# Patient Record
Sex: FEMALE | Race: BLACK OR AFRICAN AMERICAN | NOT HISPANIC OR LATINO | Employment: UNEMPLOYED | RURAL
[De-identification: names, ages, dates, MRNs, and addresses within clinical notes are randomized per-mention and may not be internally consistent; named-entity substitution may affect disease eponyms.]

---

## 2017-01-10 ENCOUNTER — OFFICE VISIT (OUTPATIENT)
Dept: FAMILY MEDICINE CLINIC | Facility: CLINIC | Age: 57
End: 2017-01-10

## 2017-01-10 VITALS
WEIGHT: 164.2 LBS | HEIGHT: 66 IN | BODY MASS INDEX: 26.39 KG/M2 | DIASTOLIC BLOOD PRESSURE: 88 MMHG | SYSTOLIC BLOOD PRESSURE: 126 MMHG | HEART RATE: 74 BPM | TEMPERATURE: 98.3 F | OXYGEN SATURATION: 98 %

## 2017-01-10 DIAGNOSIS — M54.2 NECK PAIN: ICD-10-CM

## 2017-01-10 DIAGNOSIS — R13.10 DYSPHAGIA, UNSPECIFIED TYPE: ICD-10-CM

## 2017-01-10 DIAGNOSIS — I10 ESSENTIAL HYPERTENSION: ICD-10-CM

## 2017-01-10 DIAGNOSIS — Z11.59 NEED FOR HEPATITIS C SCREENING TEST: Primary | ICD-10-CM

## 2017-01-10 DIAGNOSIS — M54.12 RADICULOPATHY, CERVICAL REGION: ICD-10-CM

## 2017-01-10 DIAGNOSIS — G62.9 NEUROPATHY: ICD-10-CM

## 2017-01-10 DIAGNOSIS — E78.49 OTHER HYPERLIPIDEMIA: ICD-10-CM

## 2017-01-10 DIAGNOSIS — E78.2 MIXED HYPERLIPIDEMIA: ICD-10-CM

## 2017-01-10 DIAGNOSIS — E55.9 VITAMIN D DEFICIENCY: ICD-10-CM

## 2017-01-10 PROCEDURE — 99214 OFFICE O/P EST MOD 30 MIN: CPT | Performed by: FAMILY MEDICINE

## 2017-01-10 NOTE — MR AVS SNAPSHOT
Ines Saravia   1/10/2017 8:15 AM   Office Visit    Dept Phone:  [phone]   Encounter #:  [id number]    Provider:  Micheal Morales MD   Department:  [facility]                Your Full Care Plan              Today's Medication Changes          These changes are accurate as of: 1/10/17  8:47 AM.  If you have any questions, ask your nurse or doctor.               Stop taking medication(s)listed here:     chlorpheniramine 4 MG tablet   Commonly known as:  CHLOR-TRIMETON   Stopped by:  Micheal Morales MD           levoFLOXacin 750 MG tablet   Commonly known as:  LEVAQUIN   Stopped by:  Micheal Morales MD           mometasone 50 MCG/ACT nasal spray   Commonly known as:  NASONEX   Stopped by:  Micheal Morales MD           permethrin 5 % cream   Commonly known as:  ACTICIN   Stopped by:  Micheal Morales MD           predniSONE 10 MG tablet   Commonly known as:  DELTASONE   Stopped by:  Micheal Morales MD           triamcinolone 0.1 % cream   Commonly known as:  KENALOG   Stopped by:  Micheal Morales MD                      Your Updated Medication List          This list is accurate as of: 1/10/17  8:47 AM.  Always use your most recent med list.                atorvastatin 20 MG tablet   Commonly known as:  LIPITOR   TAKE ONE TABLET BY MOUTH AT BEDTIME       lisinopril 10 MG tablet   Commonly known as:  PRINIVIL,ZESTRIL   TAKE ONE TABLET BY MOUTH ONCE DAILY       RA VITAMIN D-3 5000 UNITS capsule   Generic drug:  vitamin d               You Were Diagnosed With        Codes Comments    Need for hepatitis C screening test    -  Primary ICD-10-CM: Z11.59  ICD-9-CM: V73.89       Instructions    Preventive Care for Adults, Female  A healthy lifestyle and preventive care can promote health and wellness. Preventive health guidelines for women include the following key practices.  · A routine yearly physical is  a good way to check with your health care provider about your health and preventive screening. It is a chance to share any concerns and updates on your health and to receive a thorough exam.  · Visit your dentist for a routine exam and preventive care every 6 months. Brush your teeth twice a day and floss once a day. Good oral hygiene prevents tooth decay and gum disease.  · The frequency of eye exams is based on your age, health, family medical history, use of contact lenses, and other factors. Follow your health care provider's recommendations for frequency of eye exams.  · Eat a healthy diet. Foods like vegetables, fruits, whole grains, low-fat dairy products, and lean protein foods contain the nutrients you need without too many calories. Decrease your intake of foods high in solid fats, added sugars, and salt. Eat the right amount of calories for you. Get information about a proper diet from your health care provider, if necessary.  · Regular physical exercise is one of the most important things you can do for your health. Most adults should get at least 150 minutes of moderate-intensity exercise (any activity that increases your heart rate and causes you to sweat) each week. In addition, most adults need muscle-strengthening exercises on 2 or more days a week.  · Maintain a healthy weight. The body mass index (BMI) is a screening tool to identify possible weight problems. It provides an estimate of body fat based on height and weight. Your health care provider can find your BMI and can help you achieve or maintain a healthy weight. For adults 20 years and older:    A BMI below 18.5 is considered underweight.    A BMI of 18.5 to 24.9 is normal.    A BMI of 25 to 29.9 is considered overweight.    A BMI of 30 and above is considered obese.  · Maintain normal blood lipids and cholesterol levels by exercising and minimizing your intake of saturated fat. Eat a balanced diet with plenty of fruit and vegetables. Blood  tests for lipids and cholesterol should begin at age 20 and be repeated every 5 years. If your lipid or cholesterol levels are high, you are over 50, or you are at high risk for heart disease, you may need your cholesterol levels checked more frequently. Ongoing high lipid and cholesterol levels should be treated with medicines if diet and exercise are not working.  · If you smoke, find out from your health care provider how to quit. If you do not use tobacco, do not start.  · Lung cancer screening is recommended for adults aged 55-80 years who are at high risk for developing lung cancer because of a history of smoking. A yearly low-dose CT scan of the lungs is recommended for people who have at least a 30-pack-year history of smoking and are a current smoker or have quit within the past 15 years. A pack year of smoking is smoking an average of 1 pack of cigarettes a day for 1 year (for example: 1 pack a day for 30 years or 2 packs a day for 15 years). Yearly screening should continue until the smoker has stopped smoking for at least 15 years. Yearly screening should be stopped for people who develop a health problem that would prevent them from having lung cancer treatment.  · If you are pregnant, do not drink alcohol. If you are breastfeeding, be very cautious about drinking alcohol. If you are not pregnant and choose to drink alcohol, do not have more than 1 drink per day. One drink is considered to be 12 ounces (355 mL) of beer, 5 ounces (148 mL) of wine, or 1.5 ounces (44 mL) of liquor.  · Avoid use of street drugs. Do not share needles with anyone. Ask for help if you need support or instructions about stopping the use of drugs.  · High blood pressure causes heart disease and increases the risk of stroke. Your blood pressure should be checked at least every 1 to 2 years. Ongoing high blood pressure should be treated with medicines if weight loss and exercise do not work.  · If you are 55-79 years old, ask your  "health care provider if you should take aspirin to prevent strokes.  · Diabetes screening is done by taking a blood sample to check your blood glucose level after you have not eaten for a certain period of time (fasting). If you are not overweight and you do not have risk factors for diabetes, you should be screened once every 3 years starting at age 45. If you are overweight or obese and you are 40-70 years of age, you should be screened for diabetes every year as part of your cardiovascular risk assessment.  · Breast cancer screening is essential preventive care for women. You should practice \"breast self-awareness.\" This means understanding the normal appearance and feel of your breasts and may include breast self-examination. Any changes detected, no matter how small, should be reported to a health care provider. Women in their 20s and 30s should have a clinical breast exam (CBE) by a health care provider as part of a regular health exam every 1 to 3 years. After age 40, women should have a CBE every year. Starting at age 40, women should consider having a mammogram (breast X-ray test) every year. Women who have a family history of breast cancer should talk to their health care provider about genetic screening. Women at a high risk of breast cancer should talk to their health care providers about having an MRI and a mammogram every year.  · Breast cancer gene (BRCA)-related cancer risk assessment is recommended for women who have family members with BRCA-related cancers. BRCA-related cancers include breast, ovarian, tubal, and peritoneal cancers. Having family members with these cancers may be associated with an increased risk for harmful changes (mutations) in the breast cancer genes BRCA1 and BRCA2. Results of the assessment will determine the need for genetic counseling and BRCA1 and BRCA2 testing.  · Your health care provider may recommend that you be screened regularly for cancer of the pelvic organs " (ovaries, uterus, and vagina). This screening involves a pelvic examination, including checking for microscopic changes to the surface of your cervix (Pap test). You may be encouraged to have this screening done every 3 years, beginning at age 21.    For women ages 30-65, health care providers may recommend pelvic exams and Pap testing every 3 years, or they may recommend the Pap and pelvic exam, combined with testing for human papilloma virus (HPV), every 5 years. Some types of HPV increase your risk of cervical cancer. Testing for HPV may also be done on women of any age with unclear Pap test results.    Other health care providers may not recommend any screening for nonpregnant women who are considered low risk for pelvic cancer and who do not have symptoms. Ask your health care provider if a screening pelvic exam is right for you.  · If you have had past treatment for cervical cancer or a condition that could lead to cancer, you need Pap tests and screening for cancer for at least 20 years after your treatment. If Pap tests have been discontinued, your risk factors (such as having a new sexual partner) need to be reassessed to determine if screening should resume. Some women have medical problems that increase the chance of getting cervical cancer. In these cases, your health care provider may recommend more frequent screening and Pap tests.  · Colorectal cancer can be detected and often prevented. Most routine colorectal cancer screening begins at the age of 50 years and continues through age 75 years. However, your health care provider may recommend screening at an earlier age if you have risk factors for colon cancer. On a yearly basis, your health care provider may provide home test kits to check for hidden blood in the stool. Use of a small camera at the end of a tube, to directly examine the colon (sigmoidoscopy or colonoscopy), can detect the earliest forms of colorectal cancer. Talk to your health care  provider about this at age 50, when routine screening begins.  Direct exam of the colon should be repeated every 5-10 years through age 75 years, unless early forms of precancerous polyps or small growths are found.  · People who are at an increased risk for hepatitis B should be screened for this virus. You are considered at high risk for hepatitis B if:    You were born in a country where hepatitis B occurs often. Talk with your health care provider about which countries are considered high risk.    Your parents were born in a high-risk country and you have not received a shot to protect against hepatitis B (hepatitis B vaccine).    You have HIV or AIDS.    You use needles to inject street drugs.    You live with, or have sex with, someone who has hepatitis B.    You get hemodialysis treatment.    You take certain medicines for conditions like cancer, organ transplantation, and autoimmune conditions.  · Hepatitis C blood testing is recommended for all people born from 1945 through 1965 and any individual with known risks for hepatitis C.  · Practice safe sex. Use condoms and avoid high-risk sexual practices to reduce the spread of sexually transmitted infections (STIs). STIs include gonorrhea, chlamydia, syphilis, trichomonas, herpes, HPV, and human immunodeficiency virus (HIV). Herpes, HIV, and HPV are viral illnesses that have no cure. They can result in disability, cancer, and death.  · You should be screened for sexually transmitted illnesses (STIs) including gonorrhea and chlamydia if:    You are sexually active and are younger than 24 years.    You are older than 24 years and your health care provider tells you that you are at risk for this type of infection.    Your sexual activity has changed since you were last screened and you are at an increased risk for chlamydia or gonorrhea. Ask your health care provider if you are at risk.  · If you are at risk of being infected with HIV, it is recommended that you  take a prescription medicine daily to prevent HIV infection. This is called preexposure prophylaxis (PrEP). You are considered at risk if:    You are sexually active and do not regularly use condoms or know the HIV status of your partner(s).    You take drugs by injection.    You are sexually active with a partner who has HIV.    Talk with your health care provider about whether you are at high risk of being infected with HIV. If you choose to begin PrEP, you should first be tested for HIV. You should then be tested every 3 months for as long as you are taking PrEP.  · Osteoporosis is a disease in which the bones lose minerals and strength with aging. This can result in serious bone fractures or breaks. The risk of osteoporosis can be identified using a bone density scan. Women ages 65 years and over and women at risk for fractures or osteoporosis should discuss screening with their health care providers. Ask your health care provider whether you should take a calcium supplement or vitamin D to reduce the rate of osteoporosis.  · Menopause can be associated with physical symptoms and risks. Hormone replacement therapy is available to decrease symptoms and risks. You should talk to your health care provider about whether hormone replacement therapy is right for you.  · Use sunscreen. Apply sunscreen liberally and repeatedly throughout the day. You should seek shade when your shadow is shorter than you. Protect yourself by wearing long sleeves, pants, a wide-brimmed hat, and sunglasses year round, whenever you are outdoors.  · Once a month, do a whole body skin exam, using a mirror to look at the skin on your back. Tell your health care provider of new moles, moles that have irregular borders, moles that are larger than a pencil eraser, or moles that have changed in shape or color.  · Stay current with required vaccines (immunizations).    Influenza vaccine. All adults should be immunized every year.    Tetanus,  diphtheria, and acellular pertussis (Td, Tdap) vaccine. Pregnant women should receive 1 dose of Tdap vaccine during each pregnancy. The dose should be obtained regardless of the length of time since the last dose. Immunization is preferred during the 27th-36th week of gestation. An adult who has not previously received Tdap or who does not know her vaccine status should receive 1 dose of Tdap. This initial dose should be followed by tetanus and diphtheria toxoids (Td) booster doses every 10 years. Adults with an unknown or incomplete history of completing a 3-dose immunization series with Td-containing vaccines should begin or complete a primary immunization series including a Tdap dose. Adults should receive a Td booster every 10 years.    Varicella vaccine. An adult without evidence of immunity to varicella should receive 2 doses or a second dose if she has previously received 1 dose. Pregnant females who do not have evidence of immunity should receive the first dose after pregnancy. This first dose should be obtained before leaving the health care facility. The second dose should be obtained 4-8 weeks after the first dose.    Human papillomavirus (HPV) vaccine. Females aged 13-26 years who have not received the vaccine previously should obtain the 3-dose series. The vaccine is not recommended for use in pregnant females. However, pregnancy testing is not needed before receiving a dose. If a female is found to be pregnant after receiving a dose, no treatment is needed. In that case, the remaining doses should be delayed until after the pregnancy. Immunization is recommended for any person with an immunocompromised condition through the age of 26 years if she did not get any or all doses earlier. During the 3-dose series, the second dose should be obtained 4-8 weeks after the first dose. The third dose should be obtained 24 weeks after the first dose and 16 weeks after the second dose.    Zoster vaccine. One dose  is recommended for adults aged 60 years or older unless certain conditions are present.    Measles, mumps, and rubella (MMR) vaccine. Adults born before 1957 generally are considered immune to measles and mumps. Adults born in 1957 or later should have 1 or more doses of MMR vaccine unless there is a contraindication to the vaccine or there is laboratory evidence of immunity to each of the three diseases. A routine second dose of MMR vaccine should be obtained at least 28 days after the first dose for students attending postsecondary schools, health care workers, or international travelers. People who received inactivated measles vaccine or an unknown type of measles vaccine during [id number] should receive 2 doses of MMR vaccine. People who received inactivated mumps vaccine or an unknown type of mumps vaccine before 1979 and are at high risk for mumps infection should consider immunization with 2 doses of MMR vaccine. For females of childbearing age, rubella immunity should be determined. If there is no evidence of immunity, females who are not pregnant should be vaccinated. If there is no evidence of immunity, females who are pregnant should delay immunization until after pregnancy. Unvaccinated health care workers born before 1957 who lack laboratory evidence of measles, mumps, or rubella immunity or laboratory confirmation of disease should consider measles and mumps immunization with 2 doses of MMR vaccine or rubella immunization with 1 dose of MMR vaccine.    Pneumococcal 13-valent conjugate (PCV13) vaccine. When indicated, a person who is uncertain of his immunization history and has no record of immunization should receive the PCV13 vaccine. All adults 65 years of age and older should receive this vaccine. An adult aged 19 years or older who has certain medical conditions and has not been previously immunized should receive 1 dose of PCV13 vaccine. This PCV13 should be followed with a dose of pneumococcal  polysaccharide (PPSV23) vaccine. Adults who are at high risk for pneumococcal disease should obtain the PPSV23 vaccine at least 8 weeks after the dose of PCV13 vaccine. Adults older than 65 years of age who have normal immune system function should obtain the PPSV23 vaccine dose at least 1 year after the dose of PCV13 vaccine.    Pneumococcal polysaccharide (PPSV23) vaccine. When PCV13 is also indicated, PCV13 should be obtained first. All adults aged 65 years and older should be immunized. An adult younger than age 65 years who has certain medical conditions should be immunized. Any person who resides in a nursing home or long-term care facility should be immunized. An adult smoker should be immunized. People with an immunocompromised condition and certain other conditions should receive both PCV13 and PPSV23 vaccines. People with human immunodeficiency virus (HIV) infection should be immunized as soon as possible after diagnosis. Immunization during chemotherapy or radiation therapy should be avoided. Routine use of PPSV23 vaccine is not recommended for American Indians, [place] Natives, or people younger than 65 years unless there are medical conditions that require PPSV23 vaccine. When indicated, people who have unknown immunization and have no record of immunization should receive PPSV23 vaccine. One-time revaccination 5 years after the first dose of PPSV23 is recommended for people aged 19-64 years who have chronic kidney failure, nephrotic syndrome, asplenia, or immunocompromised conditions. People who received 1-2 doses of PPSV23 before age 65 years should receive another dose of PPSV23 vaccine at age 65 years or later if at least 5 years have passed since the previous dose. Doses of PPSV23 are not needed for people immunized with PPSV23 at or after age 65 years.    Meningococcal vaccine. Adults with asplenia or persistent complement component deficiencies should receive 2 doses of quadrivalent meningococcal  conjugate (MenACWY-D) vaccine. The doses should be obtained at least 2 months apart. Microbiologists working with certain meningococcal bacteria,  recruits, people at risk during an outbreak, and people who travel to or live in countries with a high rate of meningitis should be immunized. A first-year college student up through age 21 years who is living in a residence [place] should receive a dose if she did not receive a dose on or after her 16th birthday. Adults who have certain high-risk conditions should receive one or more doses of vaccine.    Hepatitis A vaccine. Adults who wish to be protected from this disease, have certain high-risk conditions, work with hepatitis A-infected animals, work in hepatitis A research labs, or travel to or work in countries with a high rate of hepatitis A should be immunized. Adults who were previously unvaccinated and who anticipate close contact with an international adoptee during the first 60 days after arrival in the [place] from a country with a high rate of hepatitis A should be immunized.    Hepatitis B vaccine. Adults who wish to be protected from this disease, have certain high-risk conditions, may be exposed to blood or other infectious body fluids, are household contacts or sex partners of hepatitis B positive people, are clients or workers in certain care facilities, or travel to or work in countries with a high rate of hepatitis B should be immunized.    Haemophilus influenzae type b (Hib) vaccine. A previously unvaccinated person with asplenia or sickle cell disease or having a scheduled splenectomy should receive 1 dose of Hib vaccine. Regardless of previous immunization, a recipient of a hematopoietic stem cell transplant should receive a 3-dose series 6-12 months after her successful transplant. Hib vaccine is not recommended for adults with HIV infection.  Preventive Services / Frequency  Ages 19 to 39 years  · Blood pressure check.** / Every 3-5  years.  · Lipid and cholesterol check.** / Every 5 years beginning at age 20.  · Clinical breast exam.** / Every 3 years for women in their 20s and 30s.  · BRCA-related cancer risk assessment.** / For women who have family members with a BRCA-related cancer (breast, ovarian, tubal, or peritoneal cancers).  · Pap test.** / Every 2 years from ages 21 through 29. Every 3 years starting at age 30 through age 65 or 70 with a history of 3 consecutive normal Pap tests.  · HPV screening.** / Every 3 years from ages 30 through ages 65 to 70 with a history of 3 consecutive normal Pap tests.  · Hepatitis C blood test.** / For any individual with known risks for hepatitis C.  · Skin self-exam. / Monthly.  · Influenza vaccine. / Every year.  · Tetanus, diphtheria, and acellular pertussis (Tdap, Td) vaccine.** / Consult your health care provider. Pregnant women should receive 1 dose of Tdap vaccine during each pregnancy. 1 dose of Td every 10 years.  · Varicella vaccine.** / Consult your health care provider. Pregnant females who do not have evidence of immunity should receive the first dose after pregnancy.  · HPV vaccine. / 3 doses over 6 months, if 26 and younger. The vaccine is not recommended for use in pregnant females. However, pregnancy testing is not needed before receiving a dose.  · Measles, mumps, rubella (MMR) vaccine.** / You need at least 1 dose of MMR if you were born in 1957 or later. You may also need a 2nd dose. For females of childbearing age, rubella immunity should be determined. If there is no evidence of immunity, females who are not pregnant should be vaccinated. If there is no evidence of immunity, females who are pregnant should delay immunization until after pregnancy.  · Pneumococcal 13-valent conjugate (PCV13) vaccine.** / Consult your health care provider.  · Pneumococcal polysaccharide (PPSV23) vaccine.** / 1 to 2 doses if you smoke cigarettes or if you have certain conditions.  · Meningococcal  vaccine.** / 1 dose if you are age 19 to 21 years and a first-year college student living in a residence [place], or have one of several medical conditions, you need to get vaccinated against meningococcal disease. You may also need additional booster doses.  · Hepatitis A vaccine.** / Consult your health care provider.  · Hepatitis B vaccine.** / Consult your health care provider.  · Haemophilus influenzae type b (Hib) vaccine.** / Consult your health care provider.  Ages 40 to 64 years  · Blood pressure check.** / Every year.  · Lipid and cholesterol check.** / Every 5 years beginning at age 20 years.  · Lung cancer screening. / Every year if you are aged 55-80 years and have a 30-pack-year history of smoking and currently smoke or have quit within the past 15 years. Yearly screening is stopped once you have quit smoking for at least 15 years or develop a health problem that would prevent you from having lung cancer treatment.  · Clinical breast exam.** / Every year after age 40 years.  ·  BRCA-related cancer risk assessment.** / For women who have family members with a BRCA-related cancer (breast, ovarian, tubal, or peritoneal cancers).  · Mammogram.** / Every year beginning at age 40 years and continuing for as long as you are in good health. Consult with your health care provider.  · Pap test.** / Every 3 years starting at age 30 years through age 65 or 70 years with a history of 3 consecutive normal Pap tests.  · HPV screening.** / Every 3 years from ages 30 years through ages 65 to 70 years with a history of 3 consecutive normal Pap tests.  · Fecal occult blood test (FOBT) of stool. / Every year beginning at age 50 years and continuing until age 75 years. You may not need to do this test if you get a colonoscopy every 10 years.  · Flexible sigmoidoscopy or colonoscopy.** / Every 5 years for a flexible sigmoidoscopy or every 10 years for a colonoscopy beginning at age 50 years and continuing until age 75  years.  · Hepatitis C blood test.** / For all people born from 1945 through 1965 and any individual with known risks for hepatitis C.  · Skin self-exam. / Monthly.  · Influenza vaccine. / Every year.  · Tetanus, diphtheria, and acellular pertussis (Tdap/Td) vaccine.** / Consult your health care provider. Pregnant women should receive 1 dose of Tdap vaccine during each pregnancy. 1 dose of Td every 10 years.  · Varicella vaccine.** / Consult your health care provider. Pregnant females who do not have evidence of immunity should receive the first dose after pregnancy.  · Zoster vaccine.** / 1 dose for adults aged 60 years or older.  · Measles, mumps, rubella (MMR) vaccine.** / You need at least 1 dose of MMR if you were born in 1957 or later. You may also need a second dose. For females of childbearing age, rubella immunity should be determined. If there is no evidence of immunity, females who are not pregnant should be vaccinated. If there is no evidence of immunity, females who are pregnant should delay immunization until after pregnancy.  · Pneumococcal 13-valent conjugate (PCV13) vaccine.** / Consult your health care provider.  · Pneumococcal polysaccharide (PPSV23) vaccine.** / 1 to 2 doses if you smoke cigarettes or if you have certain conditions.  · Meningococcal vaccine.** / Consult your health care provider.  · Hepatitis A vaccine.** / Consult your health care provider.  · Hepatitis B vaccine.** / Consult your health care provider.  · Haemophilus influenzae type b (Hib) vaccine.** / Consult your health care provider.  Ages 65 years and over  · Blood pressure check.** / Every year.  · Lipid and cholesterol check.** / Every 5 years beginning at age 20 years.  · Lung cancer screening. / Every year if you are aged 55-80 years and have a 30-pack-year history of smoking and currently smoke or have quit within the past 15 years. Yearly screening is stopped once you have quit smoking for at least 15 years or develop  a health problem that would prevent you from having lung cancer treatment.  · Clinical breast exam.** / Every year after age 40 years.  ·  BRCA-related cancer risk assessment.** / For women who have family members with a BRCA-related cancer (breast, ovarian, tubal, or peritoneal cancers).  · Mammogram.** / Every year beginning at age 40 years and continuing for as long as you are in good health. Consult with your health care provider.  · Pap test.** / Every 3 years starting at age 30 years through age 65 or 70 years with 3 consecutive normal Pap tests. Testing can be stopped between 65 and 70 years with 3 consecutive normal Pap tests and no abnormal Pap or HPV tests in the past 10 years.  · HPV screening.** / Every 3 years from ages 30 years through ages 65 or 70 years with a history of 3 consecutive normal Pap tests. Testing can be stopped between 65 and 70 years with 3 consecutive normal Pap tests and no abnormal Pap or HPV tests in the past 10 years.  · Fecal occult blood test (FOBT) of stool. / Every year beginning at age 50 years and continuing until age 75 years. You may not need to do this test if you get a colonoscopy every 10 years.  · Flexible sigmoidoscopy or colonoscopy.** / Every 5 years for a flexible sigmoidoscopy or every 10 years for a colonoscopy beginning at age 50 years and continuing until age 75 years.  · Hepatitis C blood test.** / For all people born from 1945 through 1965 and any individual with known risks for hepatitis C.  · Osteoporosis screening.** / A one-time screening for women ages 65 years and over and women at risk for fractures or osteoporosis.  · Skin self-exam. / Monthly.  · Influenza vaccine. / Every year.  · Tetanus, diphtheria, and acellular pertussis (Tdap/Td) vaccine.** / 1 dose of Td every 10 years.  · Varicella vaccine.** / Consult your health care provider.  · Zoster vaccine.** / 1 dose for adults aged 60 years or older.  · Pneumococcal 13-valent conjugate (PCV13)  vaccine.** / Consult your health care provider.  · Pneumococcal polysaccharide (PPSV23) vaccine.** / 1 dose for all adults aged 65 years and older.  · Meningococcal vaccine.** / Consult your health care provider.  · Hepatitis A vaccine.** / Consult your health care provider.  · Hepatitis B vaccine.** / Consult your health care provider.  · Haemophilus influenzae type b (Hib) vaccine.** / Consult your health care provider.  ** Family history and personal history of risk and conditions may change your health care provider's recommendations.     This information is not intended to replace advice given to you by your health care provider. Make sure you discuss any questions you have with your health care provider.     Document Released: 02/13/2003 Document Revised: 01/08/2016 Document Reviewed: 05/14/2012  [employer] Patient Education ©2016 [employer]       Patient Instructions History      Goals     Other     B/P controlled  L arm better  Teeth pulled ,obtain dentures    Rash resolved        Upcoming Appointments     Visit Type Date Time Department    OFFICE VISIT 1/10/2017  8:15 AM [facility]    OFFICE VISIT 1/31/2017  3:20 PM [facility]    OFFICE VISIT 7/12/2017  8:00 AM [facility]      MyChart Signup     Our records indicate that you have declined [facility] Document[employer]t signup. If you would like to sign up for Document[employer], please email [facility] Options [email or web address] or call [phone] to obtain an activation code.             Other Info from Your Visit           Your Appointments     Jan 31, 2017  3:20 PM CST   Office Visit with Jens Mustafa PA-C   [facility] (--)    [address] [facility] [place] [address] [phone]           Arrive 15 minutes prior to appointment.            Jul 12, 2017  8:00 AM CDT   Office Visit with Micheal Morales MD   [facility] (--)    [facility]  "Practice - [address] [place] [address]  [facility] [address] [phone]           Arrive 15 minutes prior to appointment.              Allergies     Penicillins        Reason for Visit     Hypertension     Sinusitis           Vital Signs     Blood Pressure Pulse Temperature Height Weight Oxygen Saturation    126/88 (BP Location: Left arm, Patient Position: Sitting, Cuff Size: Adult) 74 98.3 °F (36.8 °C) (Oral) 66\" (167.6 cm) 164 lb 3.2 oz (74.5 kg) 98%    Body Mass Index Smoking Status                26.5 kg/m2 Never Smoker          Problems and Diagnoses Noted     Need for hepatitis C screening test    -  Primary        "

## 2017-01-10 NOTE — PATIENT INSTRUCTIONS
Preventive Care for Adults, Female  A healthy lifestyle and preventive care can promote health and wellness. Preventive health guidelines for women include the following key practices.  · A routine yearly physical is a good way to check with your health care provider about your health and preventive screening. It is a chance to share any concerns and updates on your health and to receive a thorough exam.  · Visit your dentist for a routine exam and preventive care every 6 months. Brush your teeth twice a day and floss once a day. Good oral hygiene prevents tooth decay and gum disease.  · The frequency of eye exams is based on your age, health, family medical history, use of contact lenses, and other factors. Follow your health care provider's recommendations for frequency of eye exams.  · Eat a healthy diet. Foods like vegetables, fruits, whole grains, low-fat dairy products, and lean protein foods contain the nutrients you need without too many calories. Decrease your intake of foods high in solid fats, added sugars, and salt. Eat the right amount of calories for you. Get information about a proper diet from your health care provider, if necessary.  · Regular physical exercise is one of the most important things you can do for your health. Most adults should get at least 150 minutes of moderate-intensity exercise (any activity that increases your heart rate and causes you to sweat) each week. In addition, most adults need muscle-strengthening exercises on 2 or more days a week.  · Maintain a healthy weight. The body mass index (BMI) is a screening tool to identify possible weight problems. It provides an estimate of body fat based on height and weight. Your health care provider can find your BMI and can help you achieve or maintain a healthy weight. For adults 20 years and older:    A BMI below 18.5 is considered underweight.    A BMI of 18.5 to 24.9 is normal.    A BMI of 25 to 29.9 is considered overweight.    A  BMI of 30 and above is considered obese.  · Maintain normal blood lipids and cholesterol levels by exercising and minimizing your intake of saturated fat. Eat a balanced diet with plenty of fruit and vegetables. Blood tests for lipids and cholesterol should begin at age 20 and be repeated every 5 years. If your lipid or cholesterol levels are high, you are over 50, or you are at high risk for heart disease, you may need your cholesterol levels checked more frequently. Ongoing high lipid and cholesterol levels should be treated with medicines if diet and exercise are not working.  · If you smoke, find out from your health care provider how to quit. If you do not use tobacco, do not start.  · Lung cancer screening is recommended for adults aged 55-80 years who are at high risk for developing lung cancer because of a history of smoking. A yearly low-dose CT scan of the lungs is recommended for people who have at least a 30-pack-year history of smoking and are a current smoker or have quit within the past 15 years. A pack year of smoking is smoking an average of 1 pack of cigarettes a day for 1 year (for example: 1 pack a day for 30 years or 2 packs a day for 15 years). Yearly screening should continue until the smoker has stopped smoking for at least 15 years. Yearly screening should be stopped for people who develop a health problem that would prevent them from having lung cancer treatment.  · If you are pregnant, do not drink alcohol. If you are breastfeeding, be very cautious about drinking alcohol. If you are not pregnant and choose to drink alcohol, do not have more than 1 drink per day. One drink is considered to be 12 ounces (355 mL) of beer, 5 ounces (148 mL) of wine, or 1.5 ounces (44 mL) of liquor.  · Avoid use of street drugs. Do not share needles with anyone. Ask for help if you need support or instructions about stopping the use of drugs.  · High blood pressure causes heart disease and increases the risk  "of stroke. Your blood pressure should be checked at least every 1 to 2 years. Ongoing high blood pressure should be treated with medicines if weight loss and exercise do not work.  · If you are 55-79 years old, ask your health care provider if you should take aspirin to prevent strokes.  · Diabetes screening is done by taking a blood sample to check your blood glucose level after you have not eaten for a certain period of time (fasting). If you are not overweight and you do not have risk factors for diabetes, you should be screened once every 3 years starting at age 45. If you are overweight or obese and you are 40-70 years of age, you should be screened for diabetes every year as part of your cardiovascular risk assessment.  · Breast cancer screening is essential preventive care for women. You should practice \"breast self-awareness.\" This means understanding the normal appearance and feel of your breasts and may include breast self-examination. Any changes detected, no matter how small, should be reported to a health care provider. Women in their 20s and 30s should have a clinical breast exam (CBE) by a health care provider as part of a regular health exam every 1 to 3 years. After age 40, women should have a CBE every year. Starting at age 40, women should consider having a mammogram (breast X-ray test) every year. Women who have a family history of breast cancer should talk to their health care provider about genetic screening. Women at a high risk of breast cancer should talk to their health care providers about having an MRI and a mammogram every year.  · Breast cancer gene (BRCA)-related cancer risk assessment is recommended for women who have family members with BRCA-related cancers. BRCA-related cancers include breast, ovarian, tubal, and peritoneal cancers. Having family members with these cancers may be associated with an increased risk for harmful changes (mutations) in the breast cancer genes BRCA1 and " BRCA2. Results of the assessment will determine the need for genetic counseling and BRCA1 and BRCA2 testing.  · Your health care provider may recommend that you be screened regularly for cancer of the pelvic organs (ovaries, uterus, and vagina). This screening involves a pelvic examination, including checking for microscopic changes to the surface of your cervix (Pap test). You may be encouraged to have this screening done every 3 years, beginning at age 21.    For women ages 30-65, health care providers may recommend pelvic exams and Pap testing every 3 years, or they may recommend the Pap and pelvic exam, combined with testing for human papilloma virus (HPV), every 5 years. Some types of HPV increase your risk of cervical cancer. Testing for HPV may also be done on women of any age with unclear Pap test results.    Other health care providers may not recommend any screening for nonpregnant women who are considered low risk for pelvic cancer and who do not have symptoms. Ask your health care provider if a screening pelvic exam is right for you.  · If you have had past treatment for cervical cancer or a condition that could lead to cancer, you need Pap tests and screening for cancer for at least 20 years after your treatment. If Pap tests have been discontinued, your risk factors (such as having a new sexual partner) need to be reassessed to determine if screening should resume. Some women have medical problems that increase the chance of getting cervical cancer. In these cases, your health care provider may recommend more frequent screening and Pap tests.  · Colorectal cancer can be detected and often prevented. Most routine colorectal cancer screening begins at the age of 50 years and continues through age 75 years. However, your health care provider may recommend screening at an earlier age if you have risk factors for colon cancer. On a yearly basis, your health care provider may provide home test kits to check  for hidden blood in the stool. Use of a small camera at the end of a tube, to directly examine the colon (sigmoidoscopy or colonoscopy), can detect the earliest forms of colorectal cancer. Talk to your health care provider about this at age 50, when routine screening begins.  Direct exam of the colon should be repeated every 5-10 years through age 75 years, unless early forms of precancerous polyps or small growths are found.  · People who are at an increased risk for hepatitis B should be screened for this virus. You are considered at high risk for hepatitis B if:    You were born in a country where hepatitis B occurs often. Talk with your health care provider about which countries are considered high risk.    Your parents were born in a high-risk country and you have not received a shot to protect against hepatitis B (hepatitis B vaccine).    You have HIV or AIDS.    You use needles to inject street drugs.    You live with, or have sex with, someone who has hepatitis B.    You get hemodialysis treatment.    You take certain medicines for conditions like cancer, organ transplantation, and autoimmune conditions.  · Hepatitis C blood testing is recommended for all people born from 1945 through 1965 and any individual with known risks for hepatitis C.  · Practice safe sex. Use condoms and avoid high-risk sexual practices to reduce the spread of sexually transmitted infections (STIs). STIs include gonorrhea, chlamydia, syphilis, trichomonas, herpes, HPV, and human immunodeficiency virus (HIV). Herpes, HIV, and HPV are viral illnesses that have no cure. They can result in disability, cancer, and death.  · You should be screened for sexually transmitted illnesses (STIs) including gonorrhea and chlamydia if:    You are sexually active and are younger than 24 years.    You are older than 24 years and your health care provider tells you that you are at risk for this type of infection.    Your sexual activity has changed  since you were last screened and you are at an increased risk for chlamydia or gonorrhea. Ask your health care provider if you are at risk.  · If you are at risk of being infected with HIV, it is recommended that you take a prescription medicine daily to prevent HIV infection. This is called preexposure prophylaxis (PrEP). You are considered at risk if:    You are sexually active and do not regularly use condoms or know the HIV status of your partner(s).    You take drugs by injection.    You are sexually active with a partner who has HIV.    Talk with your health care provider about whether you are at high risk of being infected with HIV. If you choose to begin PrEP, you should first be tested for HIV. You should then be tested every 3 months for as long as you are taking PrEP.  · Osteoporosis is a disease in which the bones lose minerals and strength with aging. This can result in serious bone fractures or breaks. The risk of osteoporosis can be identified using a bone density scan. Women ages 65 years and over and women at risk for fractures or osteoporosis should discuss screening with their health care providers. Ask your health care provider whether you should take a calcium supplement or vitamin D to reduce the rate of osteoporosis.  · Menopause can be associated with physical symptoms and risks. Hormone replacement therapy is available to decrease symptoms and risks. You should talk to your health care provider about whether hormone replacement therapy is right for you.  · Use sunscreen. Apply sunscreen liberally and repeatedly throughout the day. You should seek shade when your shadow is shorter than you. Protect yourself by wearing long sleeves, pants, a wide-brimmed hat, and sunglasses year round, whenever you are outdoors.  · Once a month, do a whole body skin exam, using a mirror to look at the skin on your back. Tell your health care provider of new moles, moles that have irregular borders, moles that  are larger than a pencil eraser, or moles that have changed in shape or color.  · Stay current with required vaccines (immunizations).    Influenza vaccine. All adults should be immunized every year.    Tetanus, diphtheria, and acellular pertussis (Td, Tdap) vaccine. Pregnant women should receive 1 dose of Tdap vaccine during each pregnancy. The dose should be obtained regardless of the length of time since the last dose. Immunization is preferred during the 27th-36th week of gestation. An adult who has not previously received Tdap or who does not know her vaccine status should receive 1 dose of Tdap. This initial dose should be followed by tetanus and diphtheria toxoids (Td) booster doses every 10 years. Adults with an unknown or incomplete history of completing a 3-dose immunization series with Td-containing vaccines should begin or complete a primary immunization series including a Tdap dose. Adults should receive a Td booster every 10 years.    Varicella vaccine. An adult without evidence of immunity to varicella should receive 2 doses or a second dose if she has previously received 1 dose. Pregnant females who do not have evidence of immunity should receive the first dose after pregnancy. This first dose should be obtained before leaving the health care facility. The second dose should be obtained 4-8 weeks after the first dose.    Human papillomavirus (HPV) vaccine. Females aged 13-26 years who have not received the vaccine previously should obtain the 3-dose series. The vaccine is not recommended for use in pregnant females. However, pregnancy testing is not needed before receiving a dose. If a female is found to be pregnant after receiving a dose, no treatment is needed. In that case, the remaining doses should be delayed until after the pregnancy. Immunization is recommended for any person with an immunocompromised condition through the age of 26 years if she did not get any or all doses earlier. During the  3-dose series, the second dose should be obtained 4-8 weeks after the first dose. The third dose should be obtained 24 weeks after the first dose and 16 weeks after the second dose.    Zoster vaccine. One dose is recommended for adults aged 60 years or older unless certain conditions are present.    Measles, mumps, and rubella (MMR) vaccine. Adults born before 1957 generally are considered immune to measles and mumps. Adults born in 1957 or later should have 1 or more doses of MMR vaccine unless there is a contraindication to the vaccine or there is laboratory evidence of immunity to each of the three diseases. A routine second dose of MMR vaccine should be obtained at least 28 days after the first dose for students attending postsecondary schools, health care workers, or international travelers. People who received inactivated measles vaccine or an unknown type of measles vaccine during [id number] should receive 2 doses of MMR vaccine. People who received inactivated mumps vaccine or an unknown type of mumps vaccine before 1979 and are at high risk for mumps infection should consider immunization with 2 doses of MMR vaccine. For females of childbearing age, rubella immunity should be determined. If there is no evidence of immunity, females who are not pregnant should be vaccinated. If there is no evidence of immunity, females who are pregnant should delay immunization until after pregnancy. Unvaccinated health care workers born before 1957 who lack laboratory evidence of measles, mumps, or rubella immunity or laboratory confirmation of disease should consider measles and mumps immunization with 2 doses of MMR vaccine or rubella immunization with 1 dose of MMR vaccine.    Pneumococcal 13-valent conjugate (PCV13) vaccine. When indicated, a person who is uncertain of his immunization history and has no record of immunization should receive the PCV13 vaccine. All adults 65 years of age and older should receive this  vaccine. An adult aged 19 years or older who has certain medical conditions and has not been previously immunized should receive 1 dose of PCV13 vaccine. This PCV13 should be followed with a dose of pneumococcal polysaccharide (PPSV23) vaccine. Adults who are at high risk for pneumococcal disease should obtain the PPSV23 vaccine at least 8 weeks after the dose of PCV13 vaccine. Adults older than 65 years of age who have normal immune system function should obtain the PPSV23 vaccine dose at least 1 year after the dose of PCV13 vaccine.    Pneumococcal polysaccharide (PPSV23) vaccine. When PCV13 is also indicated, PCV13 should be obtained first. All adults aged 65 years and older should be immunized. An adult younger than age 65 years who has certain medical conditions should be immunized. Any person who resides in a nursing home or long-term care facility should be immunized. An adult smoker should be immunized. People with an immunocompromised condition and certain other conditions should receive both PCV13 and PPSV23 vaccines. People with human immunodeficiency virus (HIV) infection should be immunized as soon as possible after diagnosis. Immunization during chemotherapy or radiation therapy should be avoided. Routine use of PPSV23 vaccine is not recommended for American Indians, [place] Natives, or people younger than 65 years unless there are medical conditions that require PPSV23 vaccine. When indicated, people who have unknown immunization and have no record of immunization should receive PPSV23 vaccine. One-time revaccination 5 years after the first dose of PPSV23 is recommended for people aged 19-64 years who have chronic kidney failure, nephrotic syndrome, asplenia, or immunocompromised conditions. People who received 1-2 doses of PPSV23 before age 65 years should receive another dose of PPSV23 vaccine at age 65 years or later if at least 5 years have passed since the previous dose. Doses of PPSV23 are not  needed for people immunized with PPSV23 at or after age 65 years.    Meningococcal vaccine. Adults with asplenia or persistent complement component deficiencies should receive 2 doses of quadrivalent meningococcal conjugate (MenACWY-D) vaccine. The doses should be obtained at least 2 months apart. Microbiologists working with certain meningococcal bacteria,  recruits, people at risk during an outbreak, and people who travel to or live in countries with a high rate of meningitis should be immunized. A first-year college student up through age 21 years who is living in a residence [place] should receive a dose if she did not receive a dose on or after her 16th birthday. Adults who have certain high-risk conditions should receive one or more doses of vaccine.    Hepatitis A vaccine. Adults who wish to be protected from this disease, have certain high-risk conditions, work with hepatitis A-infected animals, work in hepatitis A research labs, or travel to or work in countries with a high rate of hepatitis A should be immunized. Adults who were previously unvaccinated and who anticipate close contact with an international adoptee during the first 60 days after arrival in the [place] from a country with a high rate of hepatitis A should be immunized.    Hepatitis B vaccine. Adults who wish to be protected from this disease, have certain high-risk conditions, may be exposed to blood or other infectious body fluids, are household contacts or sex partners of hepatitis B positive people, are clients or workers in certain care facilities, or travel to or work in countries with a high rate of hepatitis B should be immunized.    Haemophilus influenzae type b (Hib) vaccine. A previously unvaccinated person with asplenia or sickle cell disease or having a scheduled splenectomy should receive 1 dose of Hib vaccine. Regardless of previous immunization, a recipient of a hematopoietic stem cell transplant should receive a  3-dose series 6-12 months after her successful transplant. Hib vaccine is not recommended for adults with HIV infection.  Preventive Services / Frequency  Ages 19 to 39 years  · Blood pressure check.** / Every 3-5 years.  · Lipid and cholesterol check.** / Every 5 years beginning at age 20.  · Clinical breast exam.** / Every 3 years for women in their 20s and 30s.  · BRCA-related cancer risk assessment.** / For women who have family members with a BRCA-related cancer (breast, ovarian, tubal, or peritoneal cancers).  · Pap test.** / Every 2 years from ages 21 through 29. Every 3 years starting at age 30 through age 65 or 70 with a history of 3 consecutive normal Pap tests.  · HPV screening.** / Every 3 years from ages 30 through ages 65 to 70 with a history of 3 consecutive normal Pap tests.  · Hepatitis C blood test.** / For any individual with known risks for hepatitis C.  · Skin self-exam. / Monthly.  · Influenza vaccine. / Every year.  · Tetanus, diphtheria, and acellular pertussis (Tdap, Td) vaccine.** / Consult your health care provider. Pregnant women should receive 1 dose of Tdap vaccine during each pregnancy. 1 dose of Td every 10 years.  · Varicella vaccine.** / Consult your health care provider. Pregnant females who do not have evidence of immunity should receive the first dose after pregnancy.  · HPV vaccine. / 3 doses over 6 months, if 26 and younger. The vaccine is not recommended for use in pregnant females. However, pregnancy testing is not needed before receiving a dose.  · Measles, mumps, rubella (MMR) vaccine.** / You need at least 1 dose of MMR if you were born in 1957 or later. You may also need a 2nd dose. For females of childbearing age, rubella immunity should be determined. If there is no evidence of immunity, females who are not pregnant should be vaccinated. If there is no evidence of immunity, females who are pregnant should delay immunization until after pregnancy.  · Pneumococcal  13-valent conjugate (PCV13) vaccine.** / Consult your health care provider.  · Pneumococcal polysaccharide (PPSV23) vaccine.** / 1 to 2 doses if you smoke cigarettes or if you have certain conditions.  · Meningococcal vaccine.** / 1 dose if you are age 19 to 21 years and a first-year college student living in a residence [place], or have one of several medical conditions, you need to get vaccinated against meningococcal disease. You may also need additional booster doses.  · Hepatitis A vaccine.** / Consult your health care provider.  · Hepatitis B vaccine.** / Consult your health care provider.  · Haemophilus influenzae type b (Hib) vaccine.** / Consult your health care provider.  Ages 40 to 64 years  · Blood pressure check.** / Every year.  · Lipid and cholesterol check.** / Every 5 years beginning at age 20 years.  · Lung cancer screening. / Every year if you are aged 55-80 years and have a 30-pack-year history of smoking and currently smoke or have quit within the past 15 years. Yearly screening is stopped once you have quit smoking for at least 15 years or develop a health problem that would prevent you from having lung cancer treatment.  · Clinical breast exam.** / Every year after age 40 years.  ·  BRCA-related cancer risk assessment.** / For women who have family members with a BRCA-related cancer (breast, ovarian, tubal, or peritoneal cancers).  · Mammogram.** / Every year beginning at age 40 years and continuing for as long as you are in good health. Consult with your health care provider.  · Pap test.** / Every 3 years starting at age 30 years through age 65 or 70 years with a history of 3 consecutive normal Pap tests.  · HPV screening.** / Every 3 years from ages 30 years through ages 65 to 70 years with a history of 3 consecutive normal Pap tests.  · Fecal occult blood test (FOBT) of stool. / Every year beginning at age 50 years and continuing until age 75 years. You may not need to do this test if you get  a colonoscopy every 10 years.  · Flexible sigmoidoscopy or colonoscopy.** / Every 5 years for a flexible sigmoidoscopy or every 10 years for a colonoscopy beginning at age 50 years and continuing until age 75 years.  · Hepatitis C blood test.** / For all people born from 1945 through 1965 and any individual with known risks for hepatitis C.  · Skin self-exam. / Monthly.  · Influenza vaccine. / Every year.  · Tetanus, diphtheria, and acellular pertussis (Tdap/Td) vaccine.** / Consult your health care provider. Pregnant women should receive 1 dose of Tdap vaccine during each pregnancy. 1 dose of Td every 10 years.  · Varicella vaccine.** / Consult your health care provider. Pregnant females who do not have evidence of immunity should receive the first dose after pregnancy.  · Zoster vaccine.** / 1 dose for adults aged 60 years or older.  · Measles, mumps, rubella (MMR) vaccine.** / You need at least 1 dose of MMR if you were born in 1957 or later. You may also need a second dose. For females of childbearing age, rubella immunity should be determined. If there is no evidence of immunity, females who are not pregnant should be vaccinated. If there is no evidence of immunity, females who are pregnant should delay immunization until after pregnancy.  · Pneumococcal 13-valent conjugate (PCV13) vaccine.** / Consult your health care provider.  · Pneumococcal polysaccharide (PPSV23) vaccine.** / 1 to 2 doses if you smoke cigarettes or if you have certain conditions.  · Meningococcal vaccine.** / Consult your health care provider.  · Hepatitis A vaccine.** / Consult your health care provider.  · Hepatitis B vaccine.** / Consult your health care provider.  · Haemophilus influenzae type b (Hib) vaccine.** / Consult your health care provider.  Ages 65 years and over  · Blood pressure check.** / Every year.  · Lipid and cholesterol check.** / Every 5 years beginning at age 20 years.  · Lung cancer screening. / Every year if you  are aged 55-80 years and have a 30-pack-year history of smoking and currently smoke or have quit within the past 15 years. Yearly screening is stopped once you have quit smoking for at least 15 years or develop a health problem that would prevent you from having lung cancer treatment.  · Clinical breast exam.** / Every year after age 40 years.  ·  BRCA-related cancer risk assessment.** / For women who have family members with a BRCA-related cancer (breast, ovarian, tubal, or peritoneal cancers).  · Mammogram.** / Every year beginning at age 40 years and continuing for as long as you are in good health. Consult with your health care provider.  · Pap test.** / Every 3 years starting at age 30 years through age 65 or 70 years with 3 consecutive normal Pap tests. Testing can be stopped between 65 and 70 years with 3 consecutive normal Pap tests and no abnormal Pap or HPV tests in the past 10 years.  · HPV screening.** / Every 3 years from ages 30 years through ages 65 or 70 years with a history of 3 consecutive normal Pap tests. Testing can be stopped between 65 and 70 years with 3 consecutive normal Pap tests and no abnormal Pap or HPV tests in the past 10 years.  · Fecal occult blood test (FOBT) of stool. / Every year beginning at age 50 years and continuing until age 75 years. You may not need to do this test if you get a colonoscopy every 10 years.  · Flexible sigmoidoscopy or colonoscopy.** / Every 5 years for a flexible sigmoidoscopy or every 10 years for a colonoscopy beginning at age 50 years and continuing until age 75 years.  · Hepatitis C blood test.** / For all people born from 1945 through 1965 and any individual with known risks for hepatitis C.  · Osteoporosis screening.** / A one-time screening for women ages 65 years and over and women at risk for fractures or osteoporosis.  · Skin self-exam. / Monthly.  · Influenza vaccine. / Every year.  · Tetanus, diphtheria, and acellular pertussis (Tdap/Td)  vaccine.** / 1 dose of Td every 10 years.  · Varicella vaccine.** / Consult your health care provider.  · Zoster vaccine.** / 1 dose for adults aged 60 years or older.  · Pneumococcal 13-valent conjugate (PCV13) vaccine.** / Consult your health care provider.  · Pneumococcal polysaccharide (PPSV23) vaccine.** / 1 dose for all adults aged 65 years and older.  · Meningococcal vaccine.** / Consult your health care provider.  · Hepatitis A vaccine.** / Consult your health care provider.  · Hepatitis B vaccine.** / Consult your health care provider.  · Haemophilus influenzae type b (Hib) vaccine.** / Consult your health care provider.  ** Family history and personal history of risk and conditions may change your health care provider's recommendations.     This information is not intended to replace advice given to you by your health care provider. Make sure you discuss any questions you have with your health care provider.     Document Released: 02/13/2003 Document Revised: 01/08/2016 Document Reviewed: 05/14/2012  [employer] Interactive Patient Education ©2016 [employer]  Chronic Pain  Chronic pain can be defined as pain that is off and on and lasts for 3-6 months or longer. Many things cause chronic pain, which can make it difficult to make a diagnosis. There are many treatment options available for chronic pain. However, finding a treatment that works well for you may require trying various approaches until the right one is found. Many people benefit from a combination of two or more types of treatment to control their pain.  SYMPTOMS   Chronic pain can occur anywhere in the body and can range from mild to very severe. Some types of chronic pain include:  · Headache.  · Low back pain.  · Cancer pain.  · Arthritis pain.  · Neurogenic pain. This is pain resulting from damage to nerves.   People with chronic pain may also have other symptoms such as:  · Depression.  · Anger.  · Insomnia.  · Anxiety.  DIAGNOSIS   Your  health care provider will help diagnose your condition over time. In many cases, the initial focus will be on excluding possible conditions that could be causing the pain. Depending on your symptoms, your health care provider may order tests to diagnose your condition. Some of these tests may include:   · Blood tests.    · CT scan.    · MRI.    · X-rays.    · Ultrasounds.    · Nerve conduction studies.    You may need to see a specialist.   TREATMENT   Finding treatment that works well may take time. You may be referred to a pain specialist. He or she may prescribe medicine or therapies, such as:   · Mindful meditation or yoga.  · Shots (injections) of numbing or pain-relieving medicines into the spine or area of pain.  · Local electrical stimulation.  · Acupuncture.    · Massage therapy.    · Aroma, color, light, or sound therapy.    · Biofeedback.    · Working with a physical therapist to keep from getting stiff.    · Regular, gentle exercise.    · Cognitive or behavioral therapy.    · Group support.    Sometimes, surgery may be recommended.   HOME CARE INSTRUCTIONS   · Take all medicines as directed by your health care provider.    · Lessen stress in your life by relaxing and doing things such as listening to calming music.    · Exercise or be active as directed by your health care provider.    · Eat a healthy diet and include things such as vegetables, fruits, fish, and lean meats in your diet.    · Keep all follow-up appointments with your health care provider.    · Attend a support group with others suffering from chronic pain.  SEEK MEDICAL CARE IF:   · Your pain gets worse.    · You develop a new pain that was not there before.    · You cannot tolerate medicines given to you by your health care provider.    · You have new symptoms since your last visit with your health care provider.    SEEK IMMEDIATE MEDICAL CARE IF:   · You feel weak.    · You have decreased sensation or numbness.    · You lose control of  bowel or bladder function.    · Your pain suddenly gets much worse.    · You develop shaking.  · You develop chills.  · You develop confusion.  · You develop chest pain.  · You develop shortness of breath.    MAKE SURE YOU:  · Understand these instructions.  · Will watch your condition.  · Will get help right away if you are not doing well or get worse.     This information is not intended to replace advice given to you by your health care provider. Make sure you discuss any questions you have with your health care provider.     Document Released: 09/09/2003 Document Revised: 08/20/2014 Document Reviewed: 06/13/2014  [employer] Interactive Patient Education ©2016 [employer]    Hypertension  Hypertension, commonly called high blood pressure, is when the force of blood pumping through your arteries is too strong. Your arteries are the blood vessels that carry blood from your heart throughout your body. A blood pressure reading consists of a higher number over a lower number, such as 110/72. The higher number (systolic) is the pressure inside your arteries when your heart pumps. The lower number (diastolic) is the pressure inside your arteries when your heart relaxes. Ideally you want your blood pressure below 120/80.  Hypertension forces your heart to work harder to pump blood. Your arteries may become narrow or stiff. Having untreated or uncontrolled hypertension can cause heart attack, stroke, kidney disease, and other problems.  RISK FACTORS  Some risk factors for high blood pressure are controllable. Others are not.   Risk factors you cannot control include:   Race. You may be at higher risk if you are .  Age. Risk increases with age.  Gender. Men are at higher risk than women before age 45 years. After age 65, women are at higher risk than men.  Risk factors you can control include:  Not getting enough exercise or physical activity.  Being overweight.  Getting too much fat, sugar, calories, or  salt in your diet.  Drinking too much alcohol.  SIGNS AND SYMPTOMS  Hypertension does not usually cause signs or symptoms. Extremely high blood pressure (hypertensive crisis) may cause headache, anxiety, shortness of breath, and nosebleed.  DIAGNOSIS  To check if you have hypertension, your health care provider will measure your blood pressure while you are seated, with your arm held at the level of your heart. It should be measured at least twice using the same arm. Certain conditions can cause a difference in blood pressure between your right and left arms. A blood pressure reading that is higher than normal on one occasion does not mean that you need treatment. If it is not clear whether you have high blood pressure, you may be asked to return on a different day to have your blood pressure checked again. Or, you may be asked to monitor your blood pressure at home for 1 or more weeks.  TREATMENT  Treating high blood pressure includes making lifestyle changes and possibly taking medicine. Living a healthy lifestyle can help lower high blood pressure. You may need to change some of your habits.  Lifestyle changes may include:  Following the DASH diet. This diet is high in fruits, vegetables, and whole grains. It is low in salt, red meat, and added sugars.  Keep your sodium intake below 2,300 mg per day.  Getting at least 30-45 minutes of aerobic exercise at least 4 times per week.  Losing weight if necessary.  Not smoking.  Limiting alcoholic beverages.  Learning ways to reduce stress.  Your health care provider may prescribe medicine if lifestyle changes are not enough to get your blood pressure under control, and if one of the following is true:  You are 18-59 years of age and your systolic blood pressure is above 140.  You are 60 years of age or older, and your systolic blood pressure is above 150.  Your diastolic blood pressure is above 90.  You have diabetes, and your systolic blood pressure is over 140 or your  diastolic blood pressure is over 90.  You have kidney disease and your blood pressure is above 140/90.  You have heart disease and your blood pressure is above 140/90.  Your personal target blood pressure may vary depending on your medical conditions, your age, and other factors.  HOME CARE INSTRUCTIONS  Have your blood pressure rechecked as directed by your health care provider.    Take medicines only as directed by your health care provider. Follow the directions carefully. Blood pressure medicines must be taken as prescribed. The medicine does not work as well when you skip doses. Skipping doses also puts you at risk for problems.  Do not smoke.    Monitor your blood pressure at home as directed by your health care provider.   SEEK MEDICAL CARE IF:   You think you are having a reaction to medicines taken.  You have recurrent headaches or feel dizzy.  You have swelling in your ankles.  You have trouble with your vision.  SEEK IMMEDIATE MEDICAL CARE IF:  You develop a severe headache or confusion.  You have unusual weakness, numbness, or feel faint.  You have severe chest or abdominal pain.  You vomit repeatedly.  You have trouble breathing.  MAKE SURE YOU:   Understand these instructions.  Will watch your condition.  Will get help right away if you are not doing well or get worse.     This information is not intended to replace advice given to you by your health care provider. Make sure you discuss any questions you have with your health care provider.     Document Released: 12/18/2006 Document Revised: 05/03/2016 Document Reviewed: 10/10/2014  [employer] Interactive Patient Education ©2016 [employer]
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
## 2017-01-12 PROBLEM — M54.12 RADICULOPATHY, CERVICAL REGION: Status: ACTIVE | Noted: 2017-01-12

## 2017-01-12 PROBLEM — Z11.59 NEED FOR HEPATITIS C SCREENING TEST: Status: ACTIVE | Noted: 2017-01-12

## 2017-01-12 PROBLEM — M54.2 NECK PAIN: Status: ACTIVE | Noted: 2017-01-12

## 2017-01-24 ENCOUNTER — LAB (OUTPATIENT)
Dept: LAB | Facility: CLINIC | Age: 57
End: 2017-01-24

## 2017-01-24 DIAGNOSIS — Z11.59 NEED FOR HEPATITIS C SCREENING TEST: ICD-10-CM

## 2017-01-24 DIAGNOSIS — I10 ESSENTIAL HYPERTENSION: ICD-10-CM

## 2017-01-24 DIAGNOSIS — E78.49 OTHER HYPERLIPIDEMIA: ICD-10-CM

## 2017-01-24 DIAGNOSIS — E55.9 VITAMIN D DEFICIENCY: ICD-10-CM

## 2017-01-24 PROCEDURE — 36415 COLL VENOUS BLD VENIPUNCTURE: CPT | Performed by: FAMILY MEDICINE

## 2017-01-24 PROCEDURE — 80053 COMPREHEN METABOLIC PANEL: CPT | Performed by: FAMILY MEDICINE

## 2017-01-24 PROCEDURE — 83036 HEMOGLOBIN GLYCOSYLATED A1C: CPT | Performed by: FAMILY MEDICINE

## 2017-01-24 PROCEDURE — 82306 VITAMIN D 25 HYDROXY: CPT | Performed by: FAMILY MEDICINE

## 2017-01-24 PROCEDURE — 85025 COMPLETE CBC W/AUTO DIFF WBC: CPT | Performed by: FAMILY MEDICINE

## 2017-01-24 PROCEDURE — 80061 LIPID PANEL: CPT | Performed by: FAMILY MEDICINE

## 2017-01-24 PROCEDURE — 86803 HEPATITIS C AB TEST: CPT | Performed by: FAMILY MEDICINE

## 2017-01-25 LAB
25(OH)D3 SERPL-MCNC: 44.1 NG/ML (ref 30–100)
ALBUMIN SERPL-MCNC: 3.7 G/DL (ref 3.4–4.8)
ALBUMIN/GLOB SERPL: 1.3 G/DL (ref 1.1–1.8)
ALP SERPL-CCNC: 88 U/L (ref 38–126)
ALT SERPL W P-5'-P-CCNC: 27 U/L (ref 9–52)
ANION GAP SERPL CALCULATED.3IONS-SCNC: 9 MMOL/L (ref 5–15)
ARTICHOKE IGE QN: 93 MG/DL (ref 1–129)
AST SERPL-CCNC: 18 U/L (ref 14–36)
BASOPHILS # BLD AUTO: 0.03 10*3/MM3 (ref 0–0.2)
BASOPHILS NFR BLD AUTO: 0.5 % (ref 0–2)
BILIRUB SERPL-MCNC: 0.6 MG/DL (ref 0.2–1.3)
BUN BLD-MCNC: 12 MG/DL (ref 7–21)
BUN/CREAT SERPL: 17.4 (ref 7–25)
CALCIUM SPEC-SCNC: 9.1 MG/DL (ref 8.4–10.2)
CHLORIDE SERPL-SCNC: 102 MMOL/L (ref 95–110)
CHOLEST SERPL-MCNC: 179 MG/DL (ref 0–199)
CO2 SERPL-SCNC: 29 MMOL/L (ref 22–31)
CREAT BLD-MCNC: 0.69 MG/DL (ref 0.5–1)
DEPRECATED RDW RBC AUTO: 44 FL (ref 36.4–46.3)
EOSINOPHIL # BLD AUTO: 0.04 10*3/MM3 (ref 0–0.7)
EOSINOPHIL NFR BLD AUTO: 0.7 % (ref 0–7)
ERYTHROCYTE [DISTWIDTH] IN BLOOD BY AUTOMATED COUNT: 13.6 % (ref 11.5–14.5)
GFR SERPL CREATININE-BSD FRML MDRD: 106 ML/MIN/1.73 (ref 51–120)
GLOBULIN UR ELPH-MCNC: 2.9 GM/DL (ref 2.3–3.5)
GLUCOSE BLD-MCNC: 86 MG/DL (ref 60–100)
HBA1C MFR BLD: 6.03 % (ref 4–5.6)
HCT VFR BLD AUTO: 37.8 % (ref 35–45)
HDLC SERPL-MCNC: 70 MG/DL (ref 60–200)
HGB BLD-MCNC: 11.8 G/DL (ref 12–15.5)
IMM GRANULOCYTES # BLD: 0.01 10*3/MM3 (ref 0–0.02)
IMM GRANULOCYTES NFR BLD: 0.2 % (ref 0–0.5)
LDLC/HDLC SERPL: 1.44 {RATIO} (ref 0–3.22)
LYMPHOCYTES # BLD AUTO: 2.06 10*3/MM3 (ref 0.6–4.2)
LYMPHOCYTES NFR BLD AUTO: 37.1 % (ref 10–50)
MCH RBC QN AUTO: 27.6 PG (ref 26.5–34)
MCHC RBC AUTO-ENTMCNC: 31.2 G/DL (ref 31.4–36)
MCV RBC AUTO: 88.5 FL (ref 80–98)
MONOCYTES # BLD AUTO: 0.27 10*3/MM3 (ref 0–0.9)
MONOCYTES NFR BLD AUTO: 4.9 % (ref 0–12)
NEUTROPHILS # BLD AUTO: 3.15 10*3/MM3 (ref 2–8.6)
NEUTROPHILS NFR BLD AUTO: 56.6 % (ref 37–80)
PLATELET # BLD AUTO: 336 10*3/MM3 (ref 150–450)
PMV BLD AUTO: 10 FL (ref 8–12)
POTASSIUM BLD-SCNC: 4.3 MMOL/L (ref 3.5–5.1)
PROT SERPL-MCNC: 6.6 G/DL (ref 6.3–8.6)
RBC # BLD AUTO: 4.27 10*6/MM3 (ref 3.77–5.16)
SODIUM BLD-SCNC: 140 MMOL/L (ref 137–145)
TRIGL SERPL-MCNC: 40 MG/DL (ref 20–199)
WBC NRBC COR # BLD: 5.56 10*3/MM3 (ref 3.2–9.8)

## 2017-01-26 ENCOUNTER — TELEPHONE (OUTPATIENT)
Dept: FAMILY MEDICINE CLINIC | Facility: CLINIC | Age: 57
End: 2017-01-26

## 2017-01-26 NOTE — TELEPHONE ENCOUNTER
----- Message from Kingsley Cason LPN sent at 1/26/2017  8:52 AM CST -----      ----- Message -----     From: Micheal Morales MD     Sent: 1/26/2017   7:06 AM       To: Kingsley Cason LPN    Good improvement on labs, Cholesterol down, Vitamin D up. Will go over at next visit.

## 2017-01-27 ENCOUNTER — TELEPHONE (OUTPATIENT)
Dept: FAMILY MEDICINE CLINIC | Facility: CLINIC | Age: 57
End: 2017-01-27

## 2017-01-27 LAB
HCV AB SER DONR QL: NEGATIVE
HCV S/C RATIO: 0.01 (ref 0–0.89)

## 2017-01-27 NOTE — TELEPHONE ENCOUNTER
----- Message from Kingsley Cason LPN sent at 1/27/2017  4:36 PM CST -----      ----- Message -----     From: Micheal Morales MD     Sent: 1/27/2017   3:38 PM       To: Kingsley Cason LPN    Hep C test is Neg.

## 2017-03-06 RX ORDER — LISINOPRIL 10 MG/1
TABLET ORAL
Qty: 30 TABLET | Refills: 5 | Status: SHIPPED | OUTPATIENT
Start: 2017-03-06 | End: 2017-09-18 | Stop reason: SDUPTHER

## 2017-05-08 RX ORDER — ATORVASTATIN CALCIUM 20 MG/1
TABLET, FILM COATED ORAL
Qty: 30 TABLET | Refills: 5 | Status: SHIPPED | OUTPATIENT
Start: 2017-05-08 | End: 2017-12-20 | Stop reason: SDUPTHER

## 2017-07-12 ENCOUNTER — OFFICE VISIT (OUTPATIENT)
Dept: FAMILY MEDICINE CLINIC | Facility: CLINIC | Age: 57
End: 2017-07-12

## 2017-07-12 VITALS
HEART RATE: 68 BPM | DIASTOLIC BLOOD PRESSURE: 92 MMHG | SYSTOLIC BLOOD PRESSURE: 138 MMHG | OXYGEN SATURATION: 94 % | TEMPERATURE: 97.9 F | WEIGHT: 165.7 LBS | BODY MASS INDEX: 26.63 KG/M2 | HEIGHT: 66 IN

## 2017-07-12 DIAGNOSIS — M54.12 RADICULOPATHY, CERVICAL REGION: ICD-10-CM

## 2017-07-12 DIAGNOSIS — M54.2 NECK PAIN: ICD-10-CM

## 2017-07-12 DIAGNOSIS — I10 ESSENTIAL HYPERTENSION: ICD-10-CM

## 2017-07-12 DIAGNOSIS — M79.602 ARM PAIN, LEFT: Primary | ICD-10-CM

## 2017-07-12 DIAGNOSIS — Z12.11 COLON CANCER SCREENING: ICD-10-CM

## 2017-07-12 PROCEDURE — 99214 OFFICE O/P EST MOD 30 MIN: CPT | Performed by: FAMILY MEDICINE
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
## 2017-07-15 PROBLEM — M79.602 ARM PAIN, LEFT: Status: ACTIVE | Noted: 2017-07-15

## 2017-07-15 PROBLEM — Z12.11 COLON CANCER SCREENING: Status: ACTIVE | Noted: 2017-07-15

## 2017-07-15 PROBLEM — M79.642 PAIN OF LEFT HAND: Status: ACTIVE | Noted: 2017-07-15

## 2017-08-01 ENCOUNTER — OFFICE VISIT (OUTPATIENT)
Dept: ORTHOPEDIC SURGERY | Facility: CLINIC | Age: 57
End: 2017-08-01

## 2017-08-01 VITALS — BODY MASS INDEX: 29.37 KG/M2 | WEIGHT: 172 LBS | HEIGHT: 64 IN

## 2017-08-01 DIAGNOSIS — G89.29 CHRONIC PAIN OF BOTH ANKLES: ICD-10-CM

## 2017-08-01 DIAGNOSIS — M79.672 LEFT FOOT PAIN: Primary | ICD-10-CM

## 2017-08-01 DIAGNOSIS — G56.02 CARPAL TUNNEL SYNDROME OF LEFT WRIST: ICD-10-CM

## 2017-08-01 DIAGNOSIS — M25.562 CHRONIC PAIN OF LEFT KNEE: ICD-10-CM

## 2017-08-01 DIAGNOSIS — G89.29 CHRONIC PAIN OF LEFT KNEE: ICD-10-CM

## 2017-08-01 DIAGNOSIS — M79.642 LEFT HAND PAIN: ICD-10-CM

## 2017-08-01 DIAGNOSIS — M79.642 PAIN OF LEFT HAND: Primary | ICD-10-CM

## 2017-08-01 DIAGNOSIS — M25.572 CHRONIC PAIN OF BOTH ANKLES: ICD-10-CM

## 2017-08-01 DIAGNOSIS — M25.522 LEFT ELBOW PAIN: ICD-10-CM

## 2017-08-01 DIAGNOSIS — M25.571 CHRONIC PAIN OF BOTH ANKLES: ICD-10-CM

## 2017-08-01 PROCEDURE — 99203 OFFICE O/P NEW LOW 30 MIN: CPT | Performed by: ORTHOPAEDIC SURGERY

## 2017-08-01 NOTE — PROGRESS NOTES
Subjective   Ines Saravia is a 57 y.o. female. Bilateral ankle pain and left hand and arm pain.     History of Present Illness Patient is here today for bilateral ankle pain and left hand and arm pain. Patient states that her ankles give away on her at times. She also states that she has numbness and tingling in her left hand and arm. Patient states this is a chronic issue and has been going on for some time.      The following portions of the patient's history were reviewed and updated as appropriate:   She  has a past medical history of Abnormal mammogram of right breast; Benign essential hypertension; Continuous leakage of urine; Dermatitis; Diabetes mellitus screening; Dysuria; Encounter for gynecological examination with abnormal finding; Generalized headache; H/O screening mammography; Impacted cerumen, bilateral; Intermittent urinary incontinence; Localized swelling, mass and lump, head; Mitral valve prolapse; Neck pain; Neck swelling; Neuropathy; Overweight; Pain; Patient's noncompliance with other medical treatment and regimen; Radiculopathy, cervical region; Screening for hyperlipidemia; Stiffness of joint; Thyroid disorder screening; and Vaginal discharge.  She  does not have any pertinent problems on file.  She  has a past surgical history that includes Tubal ligation and Other surgical history (12/19/2015).  Her family history includes Diabetes in her other; Heart disease in her other; Hyperlipidemia in her other; Hypertension in her other.  She  reports that she has never smoked. She has never used smokeless tobacco. She reports that she does not drink alcohol or use illicit drugs.  Current Outpatient Prescriptions   Medication Sig Dispense Refill   • atorvastatin (LIPITOR) 20 MG tablet TAKE ONE TABLET BY MOUTH AT BEDTIME 30 tablet 5   • Cholecalciferol (VITAMIN D3) 5000 UNITS tablet Take 1 tablet by mouth 3 (Three) Times a Week. 30 tablet 5   • lisinopril (PRINIVIL,ZESTRIL) 10 MG tablet  "TAKE ONE TABLET BY MOUTH ONCE DAILY 30 tablet 5     No current facility-administered medications for this visit.      Current Outpatient Prescriptions on File Prior to Visit   Medication Sig   • atorvastatin (LIPITOR) 20 MG tablet TAKE ONE TABLET BY MOUTH AT BEDTIME   • Cholecalciferol (VITAMIN D3) 5000 UNITS tablet Take 1 tablet by mouth 3 (Three) Times a Week.   • lisinopril (PRINIVIL,ZESTRIL) 10 MG tablet TAKE ONE TABLET BY MOUTH ONCE DAILY     No current facility-administered medications on file prior to visit.      She is allergic to penicillins..    Review of Systems  Ht 64\" (162.6 cm)  Wt 172 lb (78 kg)  BMI 29.52 kg/m2    Social History     Social History   • Marital status:      Spouse name: N/A   • Number of children: N/A   • Years of education: N/A     Occupational History   • Not on file.     Social History Main Topics   • Smoking status: Never Smoker   • Smokeless tobacco: Never Used      Comment: Current smokeless user; E-cig   • Alcohol use No   • Drug use: No   • Sexual activity: Not on file     Other Topics Concern   • Not on file     Social History Narrative       HEENT: Normocephalic.  PERRLA.  TM's clear bilaterally.  Oropharynx: Clear.  Neck: Supple, with no adenopathy.  Chest: Equal bilateral expansion.  Clear to auscultation and percussion.  Heart: Regular sinus rhythm, S1 and S2 normal.  No murmurs or extra heart sounds heard.  Abdomen: Soft, nontender, and no organomegaly.  Neurological: cranial nerves II-XII normal Vascular: pulses are present  Dermatological: no rashes  or blemishes, or any abnormality of the skin.    REVIEW OF SYSTEMS:  Negative, other than presenting complaint.  HEENT: No headaches, diplopia, blurred vision, tinnitus, vertigo, epistaxis, hoarseness or sore throat.  Pulmonary: No cough, sputum, hemoptysis, dyspnea, wheezing, or chest pain.  Cardiac: No chest pain, palpitations, orthopnea, paroxysmal nocturnal dyspnea, shortness of breath, or pedal edema.  " Gastrointestinal: No diarrhea, melena, or constipation.  Genitourinary: No dysuria, hematuria, nocturia, frequency, bladder or bowel incontinence.  Hematology: No history of any anemia, fatigue, fever, or chills or night sweats.  Dermatology: No rashes, pruritus, or increased pigmentation changes of the skin.   Objective   Physical Exam positive  Tinel sign, positive  Compression test, abductor  Pollicis brevis weak,  Positive tinel sign at the  Elbow .   Exam of left ankle shows pain on eversion and inversion of the ankle.      Assessment/Plan   Left carpal tunnel syndrome and cubital tunnel syndrome left. Plan: EMG.  Chronic ankle sprain.  X rays of ankle and  Knee today.   Ines was seen today for pain, numbness, pain, pain and pain.    Diagnoses and all orders for this visit:    Pain of left hand    Chronic pain of both ankles    Carpal tunnel syndrome of left wrist

## 2017-08-23 ENCOUNTER — TELEPHONE (OUTPATIENT)
Dept: FAMILY MEDICINE CLINIC | Facility: CLINIC | Age: 57
End: 2017-08-23

## 2017-08-23 DIAGNOSIS — Z12.11 SCREENING FOR MALIGNANT NEOPLASM OF COLON: Primary | ICD-10-CM

## 2017-08-23 NOTE — TELEPHONE ENCOUNTER
----- Message from Micheal Morales MD sent at 8/23/2017 11:59 AM CDT -----  Let Pt know cologaurd test is Neg.

## 2017-08-24 ENCOUNTER — OFFICE VISIT (OUTPATIENT)
Dept: FAMILY MEDICINE CLINIC | Facility: CLINIC | Age: 57
End: 2017-08-24

## 2017-08-24 VITALS
DIASTOLIC BLOOD PRESSURE: 80 MMHG | TEMPERATURE: 98.7 F | HEART RATE: 67 BPM | OXYGEN SATURATION: 97 % | SYSTOLIC BLOOD PRESSURE: 140 MMHG | BODY MASS INDEX: 27.76 KG/M2 | WEIGHT: 161.7 LBS

## 2017-08-24 DIAGNOSIS — G56.22 CUBITAL TUNNEL SYNDROME ON LEFT: ICD-10-CM

## 2017-08-24 DIAGNOSIS — G56.02 CARPAL TUNNEL SYNDROME OF LEFT WRIST: Primary | ICD-10-CM

## 2017-08-24 DIAGNOSIS — I10 ESSENTIAL HYPERTENSION: ICD-10-CM

## 2017-08-24 DIAGNOSIS — M54.12 RADICULOPATHY, CERVICAL REGION: ICD-10-CM

## 2017-08-24 PROCEDURE — 99214 OFFICE O/P EST MOD 30 MIN: CPT | Performed by: FAMILY MEDICINE
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
## 2017-09-18 RX ORDER — LISINOPRIL 10 MG/1
TABLET ORAL
Qty: 30 TABLET | Refills: 5 | Status: SHIPPED | OUTPATIENT
Start: 2017-09-18 | End: 2018-01-15 | Stop reason: ALTCHOICE

## 2017-09-20 ENCOUNTER — OFFICE VISIT (OUTPATIENT)
Dept: FAMILY MEDICINE CLINIC | Facility: CLINIC | Age: 57
End: 2017-09-20

## 2017-09-20 VITALS
WEIGHT: 170 LBS | DIASTOLIC BLOOD PRESSURE: 94 MMHG | OXYGEN SATURATION: 95 % | SYSTOLIC BLOOD PRESSURE: 130 MMHG | HEART RATE: 80 BPM | HEIGHT: 64 IN | BODY MASS INDEX: 29.02 KG/M2 | TEMPERATURE: 98.2 F

## 2017-09-20 DIAGNOSIS — M25.572 CHRONIC PAIN OF BOTH ANKLES: ICD-10-CM

## 2017-09-20 DIAGNOSIS — E78.49 OTHER HYPERLIPIDEMIA: Primary | ICD-10-CM

## 2017-09-20 DIAGNOSIS — M54.2 NECK PAIN: ICD-10-CM

## 2017-09-20 DIAGNOSIS — I10 ESSENTIAL HYPERTENSION: ICD-10-CM

## 2017-09-20 DIAGNOSIS — J40 BRONCHITIS: ICD-10-CM

## 2017-09-20 DIAGNOSIS — G89.29 CHRONIC PAIN OF BOTH ANKLES: ICD-10-CM

## 2017-09-20 DIAGNOSIS — G56.02 CARPAL TUNNEL SYNDROME OF LEFT WRIST: ICD-10-CM

## 2017-09-20 DIAGNOSIS — M25.571 CHRONIC PAIN OF BOTH ANKLES: ICD-10-CM

## 2017-09-20 DIAGNOSIS — E55.9 VITAMIN D DEFICIENCY: ICD-10-CM

## 2017-09-20 DIAGNOSIS — G56.22 CUBITAL TUNNEL SYNDROME ON LEFT: ICD-10-CM

## 2017-09-20 DIAGNOSIS — G62.9 NEUROPATHY: ICD-10-CM

## 2017-09-20 DIAGNOSIS — M54.12 RADICULOPATHY, CERVICAL REGION: ICD-10-CM

## 2017-09-20 DIAGNOSIS — J02.9 PHARYNGITIS, UNSPECIFIED ETIOLOGY: ICD-10-CM

## 2017-09-20 DIAGNOSIS — R13.10 DYSPHAGIA, UNSPECIFIED TYPE: ICD-10-CM

## 2017-09-20 PROCEDURE — 99214 OFFICE O/P EST MOD 30 MIN: CPT | Performed by: FAMILY MEDICINE

## 2017-09-20 RX ORDER — DEXTROMETHORPHAN HYDROBROMIDE AND PROMETHAZINE HYDROCHLORIDE 15; 6.25 MG/5ML; MG/5ML
5 SYRUP ORAL 4 TIMES DAILY PRN
Qty: 240 ML | Refills: 0 | Status: SHIPPED | OUTPATIENT
Start: 2017-09-20 | End: 2018-01-15 | Stop reason: SDUPTHER

## 2017-09-20 RX ORDER — AZITHROMYCIN 250 MG/1
TABLET, FILM COATED ORAL
Qty: 6 TABLET | Refills: 0 | Status: SHIPPED | OUTPATIENT
Start: 2017-09-20 | End: 2018-01-15

## 2017-09-20 NOTE — PATIENT INSTRUCTIONS
Pharyngitis  Pharyngitis is a sore throat (pharynx). There is redness, pain, and swelling of your throat.  HOME CARE   · Drink enough fluids to keep your pee (urine) clear or pale yellow.  · Only take medicine as told by your doctor.  ¨ You may get sick again if you do not take medicine as told. Finish your medicines, even if you start to feel better.  ¨ Do not take aspirin.  · Rest.  · Rinse your mouth (gargle) with salt water (½ tsp of salt per 1 qt of water) every 1-2 hours. This will help the pain.  · If you are not at risk for choking, you can suck on hard candy or sore throat lozenges.  GET HELP IF:  · You have large, tender lumps on your neck.  · You have a rash.  · You cough up green, yellow-brown, or bloody spit.  GET HELP RIGHT AWAY IF:   · You have a stiff neck.  · You drool or cannot swallow liquids.  · You throw up (vomit) or are not able to keep medicine or liquids down.  · You have very bad pain that does not go away with medicine.  · You have problems breathing (not from a stuffy nose).  MAKE SURE YOU:   · Understand these instructions.  · Will watch your condition.  · Will get help right away if you are not doing well or get worse.     This information is not intended to replace advice given to you by your health care provider. Make sure you discuss any questions you have with your health care provider.     Document Released: 06/05/2009 Document Revised: 10/08/2014 Document Reviewed: 08/25/2014  [employer] Patient Education ©2017 [employer]

## 2017-09-23 PROBLEM — J02.9 PHARYNGITIS: Status: ACTIVE | Noted: 2017-09-23

## 2017-09-23 PROBLEM — J40 BRONCHITIS: Status: ACTIVE | Noted: 2017-09-23
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
## 2017-12-20 RX ORDER — ATORVASTATIN CALCIUM 20 MG/1
TABLET, FILM COATED ORAL
Qty: 30 TABLET | Refills: 5 | Status: SHIPPED | OUTPATIENT
Start: 2017-12-20 | End: 2018-08-14 | Stop reason: SDUPTHER

## 2018-01-15 ENCOUNTER — OFFICE VISIT (OUTPATIENT)
Dept: FAMILY MEDICINE CLINIC | Facility: CLINIC | Age: 58
End: 2018-01-15

## 2018-01-15 VITALS
OXYGEN SATURATION: 100 % | HEIGHT: 64 IN | HEART RATE: 76 BPM | WEIGHT: 178.4 LBS | TEMPERATURE: 98.3 F | DIASTOLIC BLOOD PRESSURE: 92 MMHG | BODY MASS INDEX: 30.46 KG/M2 | SYSTOLIC BLOOD PRESSURE: 140 MMHG

## 2018-01-15 DIAGNOSIS — G89.29 CHRONIC PAIN OF LEFT KNEE: ICD-10-CM

## 2018-01-15 DIAGNOSIS — I10 ESSENTIAL HYPERTENSION: Primary | ICD-10-CM

## 2018-01-15 DIAGNOSIS — E78.49 OTHER HYPERLIPIDEMIA: ICD-10-CM

## 2018-01-15 DIAGNOSIS — M79.642 PAIN OF LEFT HAND: ICD-10-CM

## 2018-01-15 DIAGNOSIS — M25.562 CHRONIC PAIN OF LEFT KNEE: ICD-10-CM

## 2018-01-15 DIAGNOSIS — R25.2 LEG CRAMPS: ICD-10-CM

## 2018-01-15 PROCEDURE — 99214 OFFICE O/P EST MOD 30 MIN: CPT | Performed by: FAMILY MEDICINE

## 2018-01-15 RX ORDER — LISINOPRIL 20 MG/1
20 TABLET ORAL DAILY
Qty: 30 TABLET | Refills: 5 | Status: SHIPPED | OUTPATIENT
Start: 2018-01-15 | End: 2018-08-06 | Stop reason: SDUPTHER

## 2018-01-15 RX ORDER — POTASSIUM GLUCONATE 595(99)MG
TABLET, EXTENDED RELEASE ORAL
Qty: 90 EACH | Refills: 0 | Status: SHIPPED | OUTPATIENT
Start: 2018-01-15 | End: 2018-05-01

## 2018-01-15 RX ORDER — POTASSIUM GLUCONATE 595(99)MG
TABLET, EXTENDED RELEASE ORAL
Qty: 90 EACH | Refills: 0 | Status: SHIPPED | OUTPATIENT
Start: 2018-01-15 | End: 2018-01-15 | Stop reason: SDUPTHER

## 2018-01-15 RX ORDER — CYCLOBENZAPRINE HCL 10 MG
10 TABLET ORAL 2 TIMES DAILY PRN
Qty: 60 TABLET | Refills: 2 | Status: SHIPPED | OUTPATIENT
Start: 2018-01-15 | End: 2018-04-18

## 2018-01-15 RX ORDER — DEXTROMETHORPHAN HYDROBROMIDE AND PROMETHAZINE HYDROCHLORIDE 15; 6.25 MG/5ML; MG/5ML
5 SYRUP ORAL 4 TIMES DAILY PRN
Qty: 240 ML | Refills: 0 | Status: SHIPPED | OUTPATIENT
Start: 2018-01-15 | End: 2018-04-30 | Stop reason: SDUPTHER
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
## 2018-02-27 ENCOUNTER — LAB (OUTPATIENT)
Dept: LAB | Facility: CLINIC | Age: 58
End: 2018-02-27

## 2018-02-27 ENCOUNTER — OFFICE VISIT (OUTPATIENT)
Dept: FAMILY MEDICINE CLINIC | Facility: CLINIC | Age: 58
End: 2018-02-27

## 2018-02-27 VITALS
BODY MASS INDEX: 30.07 KG/M2 | SYSTOLIC BLOOD PRESSURE: 138 MMHG | TEMPERATURE: 98.8 F | HEIGHT: 64 IN | DIASTOLIC BLOOD PRESSURE: 72 MMHG | OXYGEN SATURATION: 98 % | HEART RATE: 74 BPM | WEIGHT: 176.1 LBS

## 2018-02-27 DIAGNOSIS — M54.6 ACUTE MIDLINE THORACIC BACK PAIN: Primary | ICD-10-CM

## 2018-02-27 DIAGNOSIS — I10 ESSENTIAL HYPERTENSION: ICD-10-CM

## 2018-02-27 DIAGNOSIS — Z12.11 COLON CANCER SCREENING: ICD-10-CM

## 2018-02-27 LAB
BILIRUB UR QL STRIP: ABNORMAL
CLARITY UR: CLEAR
COLOR UR: ABNORMAL
GLUCOSE UR STRIP-MCNC: NEGATIVE MG/DL
HGB UR QL STRIP.AUTO: ABNORMAL
KETONES UR QL STRIP: ABNORMAL
LEUKOCYTE ESTERASE UR QL STRIP.AUTO: ABNORMAL
NITRITE UR QL STRIP: NEGATIVE
PH UR STRIP.AUTO: 6 [PH] (ref 5–8)
PROT UR QL STRIP: ABNORMAL
SP GR UR STRIP: 1.02 (ref 1–1.03)
UROBILINOGEN UR QL STRIP: ABNORMAL

## 2018-02-27 PROCEDURE — 84443 ASSAY THYROID STIM HORMONE: CPT | Performed by: FAMILY MEDICINE

## 2018-02-27 PROCEDURE — 36415 COLL VENOUS BLD VENIPUNCTURE: CPT | Performed by: FAMILY MEDICINE

## 2018-02-27 PROCEDURE — 85025 COMPLETE CBC W/AUTO DIFF WBC: CPT | Performed by: FAMILY MEDICINE

## 2018-02-27 PROCEDURE — 80053 COMPREHEN METABOLIC PANEL: CPT | Performed by: FAMILY MEDICINE

## 2018-02-27 PROCEDURE — 99213 OFFICE O/P EST LOW 20 MIN: CPT | Performed by: FAMILY MEDICINE

## 2018-02-27 PROCEDURE — 80061 LIPID PANEL: CPT | Performed by: FAMILY MEDICINE

## 2018-02-27 PROCEDURE — 81003 URINALYSIS AUTO W/O SCOPE: CPT | Performed by: FAMILY MEDICINE

## 2018-02-27 RX ORDER — PREDNISONE 10 MG/1
10 TABLET ORAL SEE ADMIN INSTRUCTIONS
Qty: 17 TABLET | Refills: 0 | Status: SHIPPED | OUTPATIENT
Start: 2018-02-27 | End: 2018-04-18

## 2018-02-27 RX ORDER — TRIAMCINOLONE ACETONIDE 40 MG/ML
40 INJECTION, SUSPENSION INTRA-ARTICULAR; INTRAMUSCULAR ONCE
Status: DISCONTINUED | OUTPATIENT
Start: 2018-02-27 | End: 2018-04-18

## 2018-02-27 RX ORDER — METHOCARBAMOL 750 MG/1
750 TABLET, FILM COATED ORAL 4 TIMES DAILY PRN
Qty: 60 TABLET | Refills: 1 | Status: SHIPPED | OUTPATIENT
Start: 2018-02-27 | End: 2018-05-01
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
## 2018-02-28 ENCOUNTER — TELEPHONE (OUTPATIENT)
Dept: FAMILY MEDICINE CLINIC | Facility: CLINIC | Age: 58
End: 2018-02-28

## 2018-02-28 LAB
ALBUMIN SERPL-MCNC: 3.9 G/DL (ref 3.4–4.8)
ALBUMIN/GLOB SERPL: 1.4 G/DL (ref 1.1–1.8)
ALP SERPL-CCNC: 96 U/L (ref 38–126)
ALT SERPL W P-5'-P-CCNC: 35 U/L (ref 9–52)
ANION GAP SERPL CALCULATED.3IONS-SCNC: 12 MMOL/L (ref 5–15)
ARTICHOKE IGE QN: 105 MG/DL (ref 1–129)
AST SERPL-CCNC: 19 U/L (ref 14–36)
BASOPHILS # BLD AUTO: 0.02 10*3/MM3 (ref 0–0.2)
BASOPHILS NFR BLD AUTO: 0.5 % (ref 0–2)
BILIRUB SERPL-MCNC: 0.3 MG/DL (ref 0.2–1.3)
BUN BLD-MCNC: 12 MG/DL (ref 7–21)
BUN/CREAT SERPL: 17.4 (ref 7–25)
CALCIUM SPEC-SCNC: 9.2 MG/DL (ref 8.4–10.2)
CHLORIDE SERPL-SCNC: 104 MMOL/L (ref 95–110)
CHOLEST SERPL-MCNC: 194 MG/DL (ref 0–199)
CO2 SERPL-SCNC: 26 MMOL/L (ref 22–31)
CREAT BLD-MCNC: 0.69 MG/DL (ref 0.5–1)
DEPRECATED RDW RBC AUTO: 42 FL (ref 36.4–46.3)
EOSINOPHIL # BLD AUTO: 0.03 10*3/MM3 (ref 0–0.7)
EOSINOPHIL NFR BLD AUTO: 0.8 % (ref 0–7)
ERYTHROCYTE [DISTWIDTH] IN BLOOD BY AUTOMATED COUNT: 13.2 % (ref 11.5–14.5)
GFR SERPL CREATININE-BSD FRML MDRD: 106 ML/MIN/1.73 (ref 51–120)
GLOBULIN UR ELPH-MCNC: 2.8 GM/DL (ref 2.3–3.5)
GLUCOSE BLD-MCNC: 85 MG/DL (ref 60–100)
HCT VFR BLD AUTO: 38.4 % (ref 35–45)
HDLC SERPL-MCNC: 68 MG/DL (ref 60–200)
HGB BLD-MCNC: 11.9 G/DL (ref 12–15.5)
IMM GRANULOCYTES # BLD: 0.01 10*3/MM3 (ref 0–0.02)
IMM GRANULOCYTES NFR BLD: 0.3 % (ref 0–0.5)
LDLC/HDLC SERPL: 1.72 {RATIO} (ref 0–3.22)
LYMPHOCYTES # BLD AUTO: 1.45 10*3/MM3 (ref 0.6–4.2)
LYMPHOCYTES NFR BLD AUTO: 39.2 % (ref 10–50)
MCH RBC QN AUTO: 26.8 PG (ref 26.5–34)
MCHC RBC AUTO-ENTMCNC: 31 G/DL (ref 31.4–36)
MCV RBC AUTO: 86.5 FL (ref 80–98)
MONOCYTES # BLD AUTO: 0.39 10*3/MM3 (ref 0–0.9)
MONOCYTES NFR BLD AUTO: 10.5 % (ref 0–12)
NEUTROPHILS # BLD AUTO: 1.8 10*3/MM3 (ref 2–8.6)
NEUTROPHILS NFR BLD AUTO: 48.7 % (ref 37–80)
PLATELET # BLD AUTO: 333 10*3/MM3 (ref 150–450)
PMV BLD AUTO: 9.9 FL (ref 8–12)
POTASSIUM BLD-SCNC: 4.1 MMOL/L (ref 3.5–5.1)
PROT SERPL-MCNC: 6.7 G/DL (ref 6.3–8.6)
RBC # BLD AUTO: 4.44 10*6/MM3 (ref 3.77–5.16)
SODIUM BLD-SCNC: 142 MMOL/L (ref 137–145)
TRIGL SERPL-MCNC: 45 MG/DL (ref 20–199)
TSH SERPL DL<=0.05 MIU/L-ACNC: 0.27 MIU/ML (ref 0.46–4.68)
WBC NRBC COR # BLD: 3.7 10*3/MM3 (ref 3.2–9.8)

## 2018-02-28 NOTE — TELEPHONE ENCOUNTER
----- Message from Micheal Morales MD sent at 2/28/2018  7:43 AM CST -----  X-ray looks good, pain more likely muscle pull. C/W current tx, F/U sooner than next scheduled visit if not improving.

## 2018-03-05 ENCOUNTER — TELEPHONE (OUTPATIENT)
Dept: FAMILY MEDICINE CLINIC | Facility: CLINIC | Age: 58
End: 2018-03-05

## 2018-03-05 NOTE — TELEPHONE ENCOUNTER
----- Message from Micheal Morales MD sent at 3/3/2018  8:32 PM CST -----  Thyroid is over active , other labs OK, will go over at F/U visit,

## 2018-04-18 ENCOUNTER — OFFICE VISIT (OUTPATIENT)
Dept: FAMILY MEDICINE CLINIC | Facility: CLINIC | Age: 58
End: 2018-04-18

## 2018-04-18 VITALS
OXYGEN SATURATION: 98 % | DIASTOLIC BLOOD PRESSURE: 90 MMHG | HEIGHT: 64 IN | WEIGHT: 181.2 LBS | TEMPERATURE: 98 F | SYSTOLIC BLOOD PRESSURE: 140 MMHG | HEART RATE: 78 BPM | BODY MASS INDEX: 30.93 KG/M2

## 2018-04-18 DIAGNOSIS — M25.562 CHRONIC PAIN OF LEFT KNEE: ICD-10-CM

## 2018-04-18 DIAGNOSIS — M54.12 RADICULOPATHY, CERVICAL REGION: ICD-10-CM

## 2018-04-18 DIAGNOSIS — E78.49 OTHER HYPERLIPIDEMIA: ICD-10-CM

## 2018-04-18 DIAGNOSIS — M79.602 ARM PAIN, LEFT: Primary | ICD-10-CM

## 2018-04-18 DIAGNOSIS — M79.642 PAIN OF LEFT HAND: ICD-10-CM

## 2018-04-18 DIAGNOSIS — G89.29 CHRONIC PAIN OF LEFT KNEE: ICD-10-CM

## 2018-04-18 DIAGNOSIS — E78.2 MIXED HYPERLIPIDEMIA: ICD-10-CM

## 2018-04-18 DIAGNOSIS — I10 ESSENTIAL HYPERTENSION: ICD-10-CM

## 2018-04-18 DIAGNOSIS — G56.22 CUBITAL TUNNEL SYNDROME ON LEFT: ICD-10-CM

## 2018-04-18 DIAGNOSIS — G56.02 CARPAL TUNNEL SYNDROME OF LEFT WRIST: ICD-10-CM

## 2018-04-18 PROCEDURE — 99214 OFFICE O/P EST MOD 30 MIN: CPT | Performed by: FAMILY MEDICINE
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
## 2018-04-30 DIAGNOSIS — M25.512 LEFT SHOULDER PAIN, UNSPECIFIED CHRONICITY: Primary | ICD-10-CM

## 2018-05-01 ENCOUNTER — OFFICE VISIT (OUTPATIENT)
Dept: ORTHOPEDIC SURGERY | Facility: CLINIC | Age: 58
End: 2018-05-01

## 2018-05-01 VITALS — WEIGHT: 180 LBS | HEIGHT: 64 IN | BODY MASS INDEX: 30.73 KG/M2

## 2018-05-01 DIAGNOSIS — R20.0 NUMBNESS OR TINGLING: ICD-10-CM

## 2018-05-01 DIAGNOSIS — R20.2 NUMBNESS OR TINGLING: ICD-10-CM

## 2018-05-01 DIAGNOSIS — M25.522 LEFT ELBOW PAIN: ICD-10-CM

## 2018-05-01 DIAGNOSIS — M79.642 LEFT HAND PAIN: Primary | ICD-10-CM

## 2018-05-01 PROCEDURE — 99214 OFFICE O/P EST MOD 30 MIN: CPT | Performed by: NURSE PRACTITIONER

## 2018-05-01 RX ORDER — DEXTROMETHORPHAN HYDROBROMIDE AND PROMETHAZINE HYDROCHLORIDE 15; 6.25 MG/5ML; MG/5ML
SYRUP ORAL
Qty: 240 ML | Refills: 0 | Status: SHIPPED | OUTPATIENT
Start: 2018-05-01 | End: 2018-05-01

## 2018-05-01 NOTE — PROGRESS NOTES
Ines Saravia is a 58 y.o. female returns for     Chief Complaint   Patient presents with   • Left Hand - Pain       HISTORY OF PRESENT ILLNESS:     58-year-old -American female in the office today for left hand pain.  Apparently this pains been present for several years and she's been evaluated by Dr. Ball, her primary care provider and Dr. Santos in [place].  She reports numbness and tingling from her elbow down to her hand that primarily in the fourth and fifth digit the locking sensation in the fifth digit.  She denies any injury to the area.  She is taken anti-inflammatories gabapentin and rested upper extremity for relief.  None of these have improved her symptoms       CONCURRENT MEDICAL HISTORY:    Past Medical History:   Diagnosis Date   • Abnormal mammogram of right breast     right breast density Spot compression negative 2/3/16      • Benign essential hypertension    • Continuous leakage of urine    • Dermatitis    • Diabetes mellitus screening    • Dysuria    • Encounter for gynecological examination with abnormal finding    • Generalized headache    • H/O screening mammography    • Impacted cerumen, bilateral    • Intermittent urinary incontinence    • Localized swelling, mass and lump, head     and neck   • Mitral valve prolapse    • Neck pain    • Neck swelling    • Neuropathy     Followed by Neurology      • Overweight    • Pain    • Patient's noncompliance with other medical treatment and regimen    • Radiculopathy, cervical region     Followed by Neurology      • Screening for hyperlipidemia    • Stiffness of joint     not elsewhere classified, involving hand      • Thyroid disorder screening    • Vaginal discharge        Allergies   Allergen Reactions   • Penicillins          Current Outpatient Prescriptions:   •  atorvastatin (LIPITOR) 20 MG tablet, TAKE ONE TABLET BY MOUTH AT BEDTIME, Disp: 30 tablet, Rfl: 5  •  lisinopril (PRINIVIL,ZESTRIL) 20 MG tablet, Take 1 tablet by  "mouth Daily., Disp: 30 tablet, Rfl: 5    Past Surgical History:   Procedure Laterality Date   • OTHER SURGICAL HISTORY  12/19/2015    Remove Impacted Cerumen    • TUBAL ABDOMINAL LIGATION         ROS  No fevers or chills.  No chest pain or shortness of air.  No GI or  disturbances.    PHYSICAL EXAMINATION:       Ht 162.6 cm (64\")   Wt 81.6 kg (180 lb)   BMI 30.90 kg/m²     Physical Exam   Constitutional: She is oriented to person, place, and time. Vital signs are normal. She appears well-developed and well-nourished. She is cooperative.   HENT:   Head: Normocephalic and atraumatic.   Neck: Trachea normal and phonation normal.   Pulmonary/Chest: Effort normal. No respiratory distress.   Abdominal: Soft. Normal appearance. She exhibits no distension.   Neurological: She is alert and oriented to person, place, and time. GCS eye subscore is 4. GCS verbal subscore is 5. GCS motor subscore is 6.   Skin: Skin is warm, dry and intact.   Psychiatric: She has a normal mood and affect. Her speech is normal and behavior is normal. Judgment and thought content normal. Cognition and memory are normal.   Vitals reviewed.      GAIT:     [x]  Normal  []  Antalgic    Assistive device: [x]  None  []  Walker     []  Crutches  []  Cane     []  Wheelchair  []  Stretcher    Right Hand Exam   Right hand exam is normal.      Left Hand Exam     Tenderness   The patient is experiencing no tenderness.         Range of Motion     Wrist   Extension: normal   Flexion: normal   Pronation: normal   Supination: normal     Muscle Strength   Wrist Extension: 5/5   Wrist Flexion: 5/5   :  4/5     Tests   Tinel’s Sign (Medial Nerve): negative    Other   Erythema: absent  Scars: absent  Sensation: normal  Pulse: present      Right Elbow Exam   Right elbow exam is normal.      Left Elbow Exam     Tenderness   Left elbow tenderness location: Overall the nerve.     Range of Motion   Extension: normal   Flexion: abnormal   Pronation: normal "   Supination: normal     Muscle Strength   Pronation:  4/5   Supination:  4/5     Tests Tinel's Sign (cubital tunnel): positive    Other   Erythema: absent  Scars: absent  Sensation: normal  Pulse: present      Right Shoulder Exam   Right shoulder exam is normal.      Left Shoulder Exam     Tenderness   The patient is experiencing no tenderness.         Range of Motion   Active Abduction: normal   Extension: normal   Forward Flexion: normal   External Rotation: normal   Internal Rotation 0 degrees: normal   Internal Rotation 90 degrees: normal     Muscle Strength   Abduction: 5/5   Internal Rotation: 5/5   External Rotation: 5/5   Supraspinatus: 5/5   Subscapularis: 5/5     Tests   Cross Arm: negative  Drop Arm: negative  Impingement: negative    Other   Erythema: absent  Scars: absent  Sensation: normal  Pulse: present                    PROCEDURE:Left  hand 3 views     REASON FOR EXAM: left hand pain, M79.642 Pain in left hand       FINDINGS: No comparison. Bony and soft tissue structures of the  hand are normal. There is no evidence of fracture or dislocation.        IMPRESSION:  Negative hand     Electronically signed by:  Matthias Pham MD  8/1/2017 5:05 PM CDT  Workstation: [id number]        ASSESSMENT:    Diagnoses and all orders for this visit:    Left hand pain  -     EMG & Nerve Conduction Test; Future    Numbness or tingling  -     EMG & Nerve Conduction Test; Future    Left elbow pain  -     EMG & Nerve Conduction Test; Future          PLAN  Recommend EMGs of the left upper extremity for further evaluation pain.  Patient's requesting not to see Dr. Santos in [place]  No Follow-up on file.    SINAI Jacobs

## 2018-06-15 ENCOUNTER — OFFICE VISIT (OUTPATIENT)
Dept: FAMILY MEDICINE CLINIC | Facility: CLINIC | Age: 58
End: 2018-06-15

## 2018-06-15 VITALS
HEART RATE: 72 BPM | HEIGHT: 64 IN | TEMPERATURE: 98.3 F | BODY MASS INDEX: 29.94 KG/M2 | SYSTOLIC BLOOD PRESSURE: 136 MMHG | DIASTOLIC BLOOD PRESSURE: 90 MMHG | OXYGEN SATURATION: 99 % | WEIGHT: 175.4 LBS

## 2018-06-15 DIAGNOSIS — I10 ESSENTIAL HYPERTENSION: ICD-10-CM

## 2018-06-15 DIAGNOSIS — L50.9 HIVES: Primary | ICD-10-CM

## 2018-06-15 PROCEDURE — 99214 OFFICE O/P EST MOD 30 MIN: CPT | Performed by: FAMILY MEDICINE

## 2018-06-15 RX ORDER — HYDROXYZINE PAMOATE 50 MG/1
50 CAPSULE ORAL 3 TIMES DAILY PRN
Qty: 60 CAPSULE | Refills: 0 | Status: SHIPPED | OUTPATIENT
Start: 2018-06-15 | End: 2021-07-09

## 2018-06-15 RX ORDER — PREDNISONE 10 MG/1
10 TABLET ORAL SEE ADMIN INSTRUCTIONS
Qty: 17 TABLET | Refills: 0 | Status: SHIPPED | OUTPATIENT
Start: 2018-06-15 | End: 2018-07-12

## 2018-06-15 RX ORDER — RANITIDINE 150 MG/1
150 TABLET ORAL NIGHTLY
Qty: 30 TABLET | Refills: 2 | Status: SHIPPED | OUTPATIENT
Start: 2018-06-15 | End: 2021-07-09

## 2018-06-19 PROBLEM — L50.9 HIVES: Status: ACTIVE | Noted: 2018-06-19

## 2018-06-19 NOTE — PATIENT INSTRUCTIONS
Hives  Hives (urticaria) are itchy, red, swollen areas on your skin. Hives can appear on any part of your body and can vary in size. They can be as small as the tip of a pen or much larger. Hives often fade within 24 hours (acute hives). In other cases, new hives appear after old ones fade. This cycle can continue for several days or weeks (chronic hives).  Hives result from your body's reaction to an irritant or to something that you are allergic to (trigger). When you are exposed to a trigger, your body releases a chemical (histamine) that causes redness, itching, and swelling. You can get hives immediately after being exposed to a trigger or hours later.  Hives do not spread from person to person (are not contagious). Your hives may get worse with scratching, exercise, and emotional stress.  What are the causes?  Causes of this condition include:  · Allergies to certain foods or ingredients.  · Insect bites or stings.  · Exposure to pollen or pet dander.  · Contact with latex or chemicals.  · Spending time in sunlight, heat, or cold (exposure).  · Exercise.  · Stress.    You can also get hives from some medical conditions and treatments. These include:  · Viruses, including the common cold.  · Bacterial infections, such as urinary tract infections and strep throat.  · Disorders such as vasculitis, lupus, or thyroid disease.  · Certain medications.  · Allergy shots.  · Blood transfusions.    Sometimes, the cause of hives is not known (idiopathic hives).  What increases the risk?  This condition is more likely to develop in:  · Women.  · People who have food allergies, especially to citrus fruits, milk, eggs, peanuts, tree nuts, or shellfish.  · People who are allergic to:  ? Medicines.  ? Latex.  ? Insects.  ? Animals.  ? Pollen.  · People who have certain medical conditions, including lupus or thyroid disease.    What are the signs or symptoms?  The main symptom of this condition is raised, itchy red or white  bumps or patches on your skin. These areas may:  · Become large and swollen (welts).  · Change in shape and location, quickly and repeatedly.  · Be separate hives or connect over a large area of skin.  · Sting or become painful.  · Turn white when pressed in the center (finn).    In severe cases, your hands, feet, and face may also become swollen. This may occur if hives develop deeper in your skin.  How is this diagnosed?  This condition is diagnosed based on your symptoms, medical history, and physical exam. Your skin, urine, or blood may be tested to find out what is causing your hives and to rule out other health issues. Your health care provider may also remove a small sample of skin from the affected area and examine it under a microscope (biopsy).  How is this treated?  Treatment depends on the severity of your condition. Your health care provider may recommend using cool, wet cloths (cool compresses) or taking cool showers to relieve itching. Hives are sometimes treated with medicines, including:  · Antihistamines.  · Corticosteroids.  · Antibiotics.  · An injectable medicine (omalizumab). Your health care provider may prescribe this if you have chronic idiopathic hives and you continue to have symptoms even after treatment with antihistamines.    Severe cases may require an emergency injection of adrenaline (epinephrine) to prevent a life-threatening allergic reaction (anaphylaxis).  Follow these instructions at home:  Medicines  · Take or apply over-the-counter and prescription medicines only as told by your health care provider.  · If you were prescribed an antibiotic medicine, use it as told by your health care provider. Do not stop taking the antibiotic even if you start to feel better.  Skin Care  · Apply cool compresses to the affected areas.  · Do not scratch or rub your skin.  General instructions  · Do not take hot showers or baths. This can make itching worse.  · Do not wear tight-fitting  clothing.  · Use sunscreen and wear protective clothing when you are outside.  · Avoid any substances that cause your hives. Keep a journal to help you track what causes your hives. Write down:  ? What medicines you take.  ? What you eat and drink.  ? What products you use on your skin.  · Keep all follow-up visits as told by your health care provider. This is important.  Contact a health care provider if:  · Your symptoms are not controlled with medicine.  · Your joints are painful or swollen.  Get help right away if:  · You have a fever.  · You have pain in your abdomen.  · Your tongue or lips are swollen.  · Your eyelids are swollen.  · Your chest or throat feels tight.  · You have trouble breathing or swallowing.  These symptoms may represent a serious problem that is an emergency. Do not wait to see if the symptoms will go away. Get medical help right away. Call your local emergency services (911 in the [place] Do not drive yourself to the hospital.  This information is not intended to replace advice given to you by your health care provider. Make sure you discuss any questions you have with your health care provider.  Document Released: 12/18/2006 Document Revised: 05/17/2017 Document Reviewed: 10/05/2016  Elsevier Interactive Patient Education © 2018 [employer]
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
## 2018-07-12 ENCOUNTER — OFFICE VISIT (OUTPATIENT)
Dept: FAMILY MEDICINE CLINIC | Facility: CLINIC | Age: 58
End: 2018-07-12

## 2018-07-12 VITALS
TEMPERATURE: 97.9 F | WEIGHT: 179.5 LBS | DIASTOLIC BLOOD PRESSURE: 78 MMHG | OXYGEN SATURATION: 97 % | BODY MASS INDEX: 30.64 KG/M2 | HEART RATE: 67 BPM | HEIGHT: 64 IN | SYSTOLIC BLOOD PRESSURE: 126 MMHG

## 2018-07-12 DIAGNOSIS — R07.9 CHEST PAIN, UNSPECIFIED TYPE: Primary | ICD-10-CM

## 2018-07-12 DIAGNOSIS — I10 ESSENTIAL HYPERTENSION: ICD-10-CM

## 2018-07-12 PROCEDURE — 99214 OFFICE O/P EST MOD 30 MIN: CPT | Performed by: FAMILY MEDICINE

## 2018-07-12 RX ORDER — OMEPRAZOLE 40 MG/1
40 CAPSULE, DELAYED RELEASE ORAL DAILY
Qty: 30 CAPSULE | Refills: 2 | Status: SHIPPED | OUTPATIENT
Start: 2018-07-12 | End: 2021-07-09
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
## 2018-07-15 PROBLEM — R07.9 CHEST PAIN: Status: ACTIVE | Noted: 2018-07-15

## 2018-08-06 RX ORDER — LISINOPRIL 20 MG/1
TABLET ORAL
Qty: 30 TABLET | Refills: 5 | Status: SHIPPED | OUTPATIENT
Start: 2018-08-06 | End: 2021-07-15 | Stop reason: SDUPTHER

## 2018-08-08 RX ORDER — DEXTROMETHORPHAN HYDROBROMIDE AND PROMETHAZINE HYDROCHLORIDE 15; 6.25 MG/5ML; MG/5ML
SYRUP ORAL
Refills: 0 | OUTPATIENT
Start: 2018-08-08

## 2018-08-14 RX ORDER — ATORVASTATIN CALCIUM 20 MG/1
TABLET, FILM COATED ORAL
Qty: 30 TABLET | Refills: 5 | OUTPATIENT
Start: 2018-08-14

## 2018-08-14 RX ORDER — DEXTROMETHORPHAN HYDROBROMIDE AND PROMETHAZINE HYDROCHLORIDE 15; 6.25 MG/5ML; MG/5ML
SYRUP ORAL
Refills: 0 | OUTPATIENT
Start: 2018-08-14

## 2018-08-14 RX ORDER — ATORVASTATIN CALCIUM 20 MG/1
20 TABLET, FILM COATED ORAL
Qty: 30 TABLET | Refills: 5 | Status: SHIPPED | OUTPATIENT
Start: 2018-08-14 | End: 2021-07-15 | Stop reason: SDUPTHER

## 2018-08-21 ENCOUNTER — OFFICE VISIT (OUTPATIENT)
Dept: CARDIOLOGY | Facility: CLINIC | Age: 58
End: 2018-08-21

## 2018-08-21 VITALS
HEART RATE: 64 BPM | WEIGHT: 183.1 LBS | OXYGEN SATURATION: 97 % | SYSTOLIC BLOOD PRESSURE: 136 MMHG | HEIGHT: 64 IN | DIASTOLIC BLOOD PRESSURE: 90 MMHG | BODY MASS INDEX: 31.26 KG/M2

## 2018-08-21 DIAGNOSIS — Z72.0 NICOTINE ABUSE: ICD-10-CM

## 2018-08-21 DIAGNOSIS — E78.00 PURE HYPERCHOLESTEROLEMIA: ICD-10-CM

## 2018-08-21 DIAGNOSIS — I10 ESSENTIAL HYPERTENSION: Primary | ICD-10-CM

## 2018-08-21 DIAGNOSIS — R07.89 OTHER CHEST PAIN: ICD-10-CM

## 2018-08-21 PROCEDURE — 93000 ELECTROCARDIOGRAM COMPLETE: CPT | Performed by: INTERNAL MEDICINE

## 2018-08-21 PROCEDURE — 99204 OFFICE O/P NEW MOD 45 MIN: CPT | Performed by: INTERNAL MEDICINE

## 2018-08-21 RX ORDER — ASPIRIN 325 MG
325 TABLET ORAL DAILY
Qty: 30 TABLET | Refills: 11 | COMMUNITY
End: 2018-09-13 | Stop reason: ALTCHOICE

## 2018-08-21 NOTE — PROGRESS NOTES
"  [facility] Cardiology  OFFICE NOTE    Ines Saravia  58 y.o. female    08/21/2018  1. Essential hypertension    2. Pure hypercholesterolemia    3. Other chest pain    4. Nicotine abuse        Chief complaint -chest pain     History of present Illness- 58-year-old lady with history of hypertension and hyperlipidemia, has been a smoker before now using nicotine he cigarettes came with complaining of chest pain radiating to both arms with diaphoresis and it has been going on for years and now it started coming back again.  She never had any heart problems her EKG is normal.  Denies any GI symptoms and takes Prilosec for GERD along with ranitidine.  Takes Vistaril for anxiety.              Allergies   Allergen Reactions   • Penicillins Other (See Comments)     \"knots\" on skin         Past Medical History:   Diagnosis Date   • Abnormal mammogram of right breast     right breast density Spot compression negative 2/3/16      • Benign essential hypertension    • Continuous leakage of urine    • Dermatitis    • Diabetes mellitus screening    • Dysuria    • Encounter for gynecological examination with abnormal finding    • Generalized headache    • H/O screening mammography    • Impacted cerumen, bilateral    • Intermittent urinary incontinence    • Localized swelling, mass and lump, head     and neck   • Mitral valve prolapse    • Neck pain    • Neck swelling    • Neuropathy     Followed by Neurology      • Overweight    • Pain    • Patient's noncompliance with other medical treatment and regimen    • Radiculopathy, cervical region     Followed by Neurology      • Screening for hyperlipidemia    • Stiffness of joint     not elsewhere classified, involving hand      • Thyroid disorder screening    • Vaginal discharge          Past Surgical History:   Procedure Laterality Date   • OTHER SURGICAL HISTORY  12/19/2015    Remove Impacted Cerumen    • TUBAL ABDOMINAL LIGATION           Family History "   Problem Relation Age of Onset   • Diabetes Other    • Hypertension Other    • Heart disease Other    • Hyperlipidemia Other          Social History     Social History   • Marital status:      Spouse name: N/A   • Number of children: N/A   • Years of education: N/A     Occupational History   • Not on file.     Social History Main Topics   • Smoking status: Current Every Day Smoker     Types: Electronic Cigarette   • Smokeless tobacco: Never Used      Comment: Current smokeless user; E-cig   • Alcohol use No   • Drug use: No   • Sexual activity: Defer     Other Topics Concern   • Not on file     Social History Narrative   • No narrative on file         Current Outpatient Prescriptions   Medication Sig Dispense Refill   • atorvastatin (LIPITOR) 20 MG tablet Take 1 tablet by mouth every night at bedtime. 30 tablet 5   • lisinopril (PRINIVIL,ZESTRIL) 20 MG tablet TAKE ONE TABLET BY MOUTH ONCE DAILY 30 tablet 5   • raNITIdine (ZANTAC) 150 MG tablet Take 1 tablet by mouth Every Night. 30 tablet 2   • aspirin 325 MG tablet Take 1 tablet by mouth Daily. 30 tablet 11   • hydrOXYzine (VISTARIL) 50 MG capsule Take 1 capsule by mouth 3 (Three) Times a Day As Needed for Itching. 60 capsule 0   • omeprazole (PRILOSEC) 40 MG capsule Take 1 capsule by mouth Daily. 30 capsule 2     No current facility-administered medications for this visit.          Review of Systems     Constitution: Denies any fatigue, fever or chills    HENT: Denies any headache, hearing impairment,     Eyes: Denies any blurring of vision, or photophobia     Cardivascular - As per history of present illness     Respiratory system- denies any COPD,  shortness of breath, Sleep apnea     Endocrine:   history of hyperlipidemia, no diabetes,  or Hypothyroidism                       Musculoskeletal:  Arthritis of the left ankle    Gastrointestinal: No nausea, vomiting, or melena    Genitourinary: No dysuria or hematuria    Neurological:   No history of seizure  "disorder, stroke, memory problems    Psychiatric/Behavioral:        No history of depression    Hematological- no history of easy bruising or any bleeding diathesis            OBJECTIVE    /90 (BP Location: Left arm, Patient Position: Sitting, Cuff Size: Adult)   Pulse 64   Ht 162.6 cm (64\")   Wt 83.1 kg (183 lb 1.6 oz)   SpO2 97%   BMI 31.43 kg/m²       Physical Exam     Constitutional: is oriented to person, place, and time.     Skin-warm and dry    Well developed and nourished in no acute distress      Head: Normocephalic and atraumatic.     Eyes: Pupils are equal    Neck: Neck supple. No bruit in the carotids    Cardiovascular: [place]ick in the fifth intercostal space   Regular rate, and  Rhythm,    S1 greater than S2, no S3 or S4, no gallop     Pulmonary/Chest:   Air  Entry is equal on both sides  No wheezing or crackles,      Abdominal: Soft.  No hepatosplenomegaly, bowel sounds are present    Musculoskeletal: No kyphoscoliosis, no significant thickening of the joints    Neurological: is alert and oriented to person, place, and time.    cranial nerve are intact .   No motor or sensory deficit    Extremities-no edema, no radial femoral delay      Psychiatric: He has a normal mood and affect.                  His behavior is normal.             ECG 12 Lead  Date/Time: 8/21/2018 3:44 PM  Performed by: DANIEL LORENZANA  Authorized by: DANIEL LORENZANA   Comparison: not compared with previous ECG   Rhythm: sinus rhythm  Clinical impression: normal ECG              Lab Results   Component Value Date    WBC 3.70 02/27/2018    HGB 11.9 (L) 02/27/2018    HCT 38.4 02/27/2018    MCV 86.5 02/27/2018     02/27/2018     Lab Results   Component Value Date    GLUCOSE 85 02/27/2018    BUN 12 02/27/2018    CREATININE 0.69 02/27/2018    EGFRIFAFRI 106 02/27/2018    BCR 17.4 02/27/2018    CO2 26.0 02/27/2018    CALCIUM 9.2 02/27/2018    ALBUMIN 3.90 02/27/2018    AST 19 02/27/2018    ALT 35 02/27/2018     Lab " Results   Component Value Date    CHOL 194 02/27/2018    CHOL 179 01/24/2017     Lab Results   Component Value Date    TRIG 45 02/27/2018    TRIG 40 01/24/2017    TRIG 57 11/07/2016     Lab Results   Component Value Date    HDL 68 02/27/2018    HDL 70 01/24/2017    HDL 61 11/07/2016     No components found for: LDLCALC  Lab Results   Component Value Date     02/27/2018    LDL 93 01/24/2017    LDL 89 11/07/2016     No results found for: HDLLDLRATIO  No components found for: CHOLHDL  Lab Results   Component Value Date    HGBA1C 6.03 (H) 01/24/2017     Lab Results   Component Value Date    TSH 0.270 (L) 02/27/2018                  A/P  Chest pain EKG is normal will schedule for exercise treadmill and echo to assess the functional capacity and to see there is an EKG changes then we'll consider cardiac catheterization.  start her on aspirin    Hypertension on lisinopril.    Hyperlipidemia on atorvastatin.              This document has been electronically signed by Cali Woodson MD on August 21, 2018 3:40 PM       EMR Dragon/Transcription disclaimer:   Some of this note may be an electronic transcription/translation of spoken language to printed text. The electronic translation of spoken language may permit erroneous, or at times, nonsensical words or phrases to be inadvertently transcribed; Although I have reviewed the note for such errors, some may still exist.

## 2018-09-10 LAB
BH CV ECHO MEAS - ACS: 1.8 CM
BH CV ECHO MEAS - AO MAX PG (FULL): 2.3 MMHG
BH CV ECHO MEAS - AO MAX PG: 6.9 MMHG
BH CV ECHO MEAS - AO MEAN PG (FULL): 2 MMHG
BH CV ECHO MEAS - AO MEAN PG: 4 MMHG
BH CV ECHO MEAS - AO ROOT AREA (BSA CORRECTED): 1.5
BH CV ECHO MEAS - AO ROOT AREA: 6.6 CM^2
BH CV ECHO MEAS - AO ROOT DIAM: 2.9 CM
BH CV ECHO MEAS - AO V2 MAX: 131 CM/SEC
BH CV ECHO MEAS - AO V2 MEAN: 90.6 CM/SEC
BH CV ECHO MEAS - AO V2 VTI: 30.6 CM
BH CV ECHO MEAS - ASC AORTA: 3 CM
BH CV ECHO MEAS - AVA(I,A): 2.7 CM^2
BH CV ECHO MEAS - AVA(I,D): 2.7 CM^2
BH CV ECHO MEAS - AVA(V,A): 2.8 CM^2
BH CV ECHO MEAS - AVA(V,D): 2.8 CM^2
BH CV ECHO MEAS - BSA(HAYCOCK): 2 M^2
BH CV ECHO MEAS - BSA: 1.9 M^2
BH CV ECHO MEAS - BZI_BMI: 31.4 KILOGRAMS/M^2
BH CV ECHO MEAS - BZI_METRIC_HEIGHT: 162.6 CM
BH CV ECHO MEAS - BZI_METRIC_WEIGHT: 83 KG
BH CV ECHO MEAS - EDV(CUBED): 59.3 ML
BH CV ECHO MEAS - EDV(TEICH): 65.9 ML
BH CV ECHO MEAS - EF(CUBED): 68.6 %
BH CV ECHO MEAS - EF(TEICH): 60.9 %
BH CV ECHO MEAS - EPSS: 0.4 CM
BH CV ECHO MEAS - ESV(CUBED): 18.6 ML
BH CV ECHO MEAS - ESV(TEICH): 25.8 ML
BH CV ECHO MEAS - FS: 32.1 %
BH CV ECHO MEAS - IVS/LVPW: 0.85
BH CV ECHO MEAS - IVSD: 1.1 CM
BH CV ECHO MEAS - LA DIMENSION: 2.9 CM
BH CV ECHO MEAS - LA/AO: 1
BH CV ECHO MEAS - LV MASS(C)D: 159.3 GRAMS
BH CV ECHO MEAS - LV MASS(C)DI: 84.6 GRAMS/M^2
BH CV ECHO MEAS - LV MAX PG: 4.6 MMHG
BH CV ECHO MEAS - LV MEAN PG: 2 MMHG
BH CV ECHO MEAS - LV V1 MAX: 107 CM/SEC
BH CV ECHO MEAS - LV V1 MEAN: 70.9 CM/SEC
BH CV ECHO MEAS - LV V1 VTI: 23.7 CM
BH CV ECHO MEAS - LVIDD: 3.9 CM
BH CV ECHO MEAS - LVIDS: 2.7 CM
BH CV ECHO MEAS - LVOT AREA (M): 3.5 CM^2
BH CV ECHO MEAS - LVOT AREA: 3.5 CM^2
BH CV ECHO MEAS - LVOT DIAM: 2.1 CM
BH CV ECHO MEAS - LVPWD: 1.3 CM
BH CV ECHO MEAS - MV A MAX VEL: 87.9 CM/SEC
BH CV ECHO MEAS - MV E MAX VEL: 91.8 CM/SEC
BH CV ECHO MEAS - MV E/A: 1
BH CV ECHO MEAS - PA MAX PG: 3.7 MMHG
BH CV ECHO MEAS - PA MEAN PG: 2 MMHG
BH CV ECHO MEAS - PA V2 MAX: 96.5 CM/SEC
BH CV ECHO MEAS - PA V2 MEAN: 66.9 CM/SEC
BH CV ECHO MEAS - PA V2 VTI: 22.8 CM
BH CV ECHO MEAS - PI END-D VEL: 95 CM/SEC
BH CV ECHO MEAS - RAP SYSTOLE: 5 MMHG
BH CV ECHO MEAS - RVDD: 2.2 CM
BH CV ECHO MEAS - RVSP: 22.6 MMHG
BH CV ECHO MEAS - SI(AO): 107.3 ML/M^2
BH CV ECHO MEAS - SI(CUBED): 21.6 ML/M^2
BH CV ECHO MEAS - SI(LVOT): 43.6 ML/M^2
BH CV ECHO MEAS - SI(TEICH): 21.3 ML/M^2
BH CV ECHO MEAS - SV(AO): 202.1 ML
BH CV ECHO MEAS - SV(CUBED): 40.7 ML
BH CV ECHO MEAS - SV(LVOT): 82.1 ML
BH CV ECHO MEAS - SV(TEICH): 40.1 ML
BH CV ECHO MEAS - TR MAX VEL: 210 CM/SEC
LV EF 2D ECHO EST: 57 %

## 2018-09-11 LAB
BH CV STRESS BP STAGE 1: NORMAL
BH CV STRESS BP STAGE 2: NORMAL
BH CV STRESS BP STAGE 3: NORMAL
BH CV STRESS DURATION MIN STAGE 1: 3
BH CV STRESS DURATION MIN STAGE 2: 3
BH CV STRESS DURATION MIN STAGE 3: 3
BH CV STRESS DURATION SEC STAGE 1: 0
BH CV STRESS DURATION SEC STAGE 2: 0
BH CV STRESS DURATION SEC STAGE 3: 0
BH CV STRESS GRADE STAGE 1: 10
BH CV STRESS GRADE STAGE 2: 12
BH CV STRESS GRADE STAGE 3: 14
BH CV STRESS HR STAGE 1: 101
BH CV STRESS HR STAGE 2: 120
BH CV STRESS HR STAGE 3: 157
BH CV STRESS METS STAGE 1: 5
BH CV STRESS METS STAGE 2: 7.5
BH CV STRESS METS STAGE 3: 10
BH CV STRESS PROTOCOL 1: NORMAL
BH CV STRESS RECOVERY BP: NORMAL MMHG
BH CV STRESS RECOVERY HR: 84 BPM
BH CV STRESS SPEED STAGE 1: 1.7
BH CV STRESS SPEED STAGE 2: 2.5
BH CV STRESS SPEED STAGE 3: 3.4
BH CV STRESS STAGE 1: 1
BH CV STRESS STAGE 2: 2
BH CV STRESS STAGE 3: 3
MAXIMAL PREDICTED HEART RATE: 161 BPM
PERCENT MAX PREDICTED HR: 99.38 %
STRESS BASELINE BP: NORMAL MMHG
STRESS BASELINE HR: 72 BPM
STRESS PERCENT HR: 117 %
STRESS POST ESTIMATED WORKLOAD: 10.1 METS
STRESS POST EXERCISE DUR MIN: 8 MIN
STRESS POST EXERCISE DUR SEC: 59 SEC
STRESS POST PEAK BP: NORMAL MMHG
STRESS POST PEAK HR: 160 BPM
STRESS TARGET HR: 137 BPM

## 2018-09-13 ENCOUNTER — OFFICE VISIT (OUTPATIENT)
Dept: FAMILY MEDICINE CLINIC | Facility: CLINIC | Age: 58
End: 2018-09-13

## 2018-09-13 ENCOUNTER — DOCUMENTATION (OUTPATIENT)
Dept: CARDIOLOGY | Facility: CLINIC | Age: 58
End: 2018-09-13

## 2018-09-13 VITALS
WEIGHT: 182.2 LBS | OXYGEN SATURATION: 98 % | HEIGHT: 64 IN | DIASTOLIC BLOOD PRESSURE: 80 MMHG | TEMPERATURE: 98.4 F | HEART RATE: 97 BPM | SYSTOLIC BLOOD PRESSURE: 122 MMHG | BODY MASS INDEX: 31.1 KG/M2

## 2018-09-13 DIAGNOSIS — K22.9 ESOPHAGEAL PROBLEM: ICD-10-CM

## 2018-09-13 DIAGNOSIS — L30.9 DERMATITIS: Primary | ICD-10-CM

## 2018-09-13 DIAGNOSIS — R07.9 CHEST PAIN, UNSPECIFIED TYPE: ICD-10-CM

## 2018-09-13 DIAGNOSIS — I10 ESSENTIAL HYPERTENSION: ICD-10-CM

## 2018-09-13 DIAGNOSIS — E78.2 MIXED HYPERLIPIDEMIA: ICD-10-CM

## 2018-09-13 PROCEDURE — 99214 OFFICE O/P EST MOD 30 MIN: CPT | Performed by: FAMILY MEDICINE

## 2018-09-13 RX ORDER — DOXYCYCLINE HYCLATE 100 MG
100 TABLET ORAL DAILY
Qty: 14 TABLET | Refills: 0 | Status: SHIPPED | OUTPATIENT
Start: 2018-09-13 | End: 2021-07-09

## 2018-09-13 RX ORDER — TRIAMCINOLONE ACETONIDE 0.25 MG/G
OINTMENT TOPICAL 3 TIMES DAILY
Qty: 80 G | Refills: 2 | Status: SHIPPED | OUTPATIENT
Start: 2018-09-13 | End: 2021-07-09
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
## 2018-09-17 PROBLEM — K22.9: Status: ACTIVE | Noted: 2018-09-17

## 2021-07-09 ENCOUNTER — OFFICE VISIT (OUTPATIENT)
Dept: FAMILY MEDICINE CLINIC | Facility: CLINIC | Age: 61
End: 2021-07-09

## 2021-07-09 VITALS
DIASTOLIC BLOOD PRESSURE: 90 MMHG | WEIGHT: 195.38 LBS | HEART RATE: 76 BPM | HEIGHT: 64 IN | BODY MASS INDEX: 33.35 KG/M2 | SYSTOLIC BLOOD PRESSURE: 150 MMHG | TEMPERATURE: 96.9 F | OXYGEN SATURATION: 99 % | RESPIRATION RATE: 20 BRPM

## 2021-07-09 DIAGNOSIS — E11.65 TYPE 2 DIABETES MELLITUS WITH HYPERGLYCEMIA, WITHOUT LONG-TERM CURRENT USE OF INSULIN (HCC): Chronic | ICD-10-CM

## 2021-07-09 DIAGNOSIS — H61.22 IMPACTED CERUMEN OF LEFT EAR: ICD-10-CM

## 2021-07-09 DIAGNOSIS — J01.90 ACUTE SINUSITIS, RECURRENCE NOT SPECIFIED, UNSPECIFIED LOCATION: Primary | ICD-10-CM

## 2021-07-09 LAB — GLUCOSE BLDC GLUCOMTR-MCNC: 118 MG/DL (ref 70–130)

## 2021-07-09 PROCEDURE — 99213 OFFICE O/P EST LOW 20 MIN: CPT | Performed by: NURSE PRACTITIONER

## 2021-07-09 RX ORDER — PREDNISONE 10 MG/1
TABLET ORAL
Qty: 8 TABLET | Refills: 0 | Status: SHIPPED | OUTPATIENT
Start: 2021-07-09 | End: 2021-07-15

## 2021-07-09 RX ORDER — AZITHROMYCIN 500 MG/1
500 TABLET, FILM COATED ORAL DAILY
Qty: 5 TABLET | Refills: 0 | Status: SHIPPED | OUTPATIENT
Start: 2021-07-09 | End: 2021-07-15

## 2021-07-09 RX ORDER — FLUCONAZOLE 150 MG/1
150 TABLET ORAL ONCE
Qty: 1 TABLET | Refills: 0 | Status: SHIPPED | OUTPATIENT
Start: 2021-07-09 | End: 2021-07-09

## 2021-07-09 NOTE — PROGRESS NOTES
"Chief Complaint  cough/sinus pressure/drainage/watery eyes/headache    Subjective          Ines Saravia presents to [facility]    [facility]    PCP: Dr. Morales    CC: \"cough, sinus pressure, drainage, headache\"    Previous patient of Dr. Morales, lost insurance and had been seen at [facility] since 2018, has insurance again and has appt to re-establish with Dr. Morales on 7-.    Diabetic.  Reports much better controlled, but hasn't been checking lately at home.  Last A1C was 2-3 months ago, does not recall results, but reports they were improved.  Had previously had blurred vision, headaches if blood sugars were high, hasn't had any recent symptoms.     Has had Covid vaccine.          Sinusitis  This is a new problem. The current episode started in the past 7 days (started around the 5th). The problem has been gradually worsening since onset. Maximum temperature: not measured--hot/cold. She is experiencing no pain (right sided head pressure). Associated symptoms include chills, congestion, coughing (worse in the beginning, some better now), ear pain (fullness for a month bilaterally--hx of cerumen impactions and ear irrigation in the past), a hoarse voice, shortness of breath (at times), sinus pressure (right side) and a sore throat (scratchy). Pertinent negatives include no diaphoresis, headaches, neck pain, sneezing or swollen glands. Treatments tried: otc sinus and claritin. The treatment provided no relief.       Review of Systems   Constitutional: Positive for chills. Negative for appetite change, diaphoresis and fatigue. Fever:  not measured, but felt feverish at times.   HENT: Positive for congestion, ear pain (fullness for a month bilaterally--hx of cerumen impactions and ear irrigation in the past), hoarse voice, postnasal drip, rhinorrhea, sinus pressure (right side), sinus pain (right side) and sore throat (scratchy). Negative for ear " "discharge, sneezing and trouble swallowing.    Eyes: Negative.    Respiratory: Positive for cough (worse in the beginning, some better now) and shortness of breath (at times). Negative for chest tightness and wheezing.    Cardiovascular: Negative.    Gastrointestinal: Negative.    Genitourinary: Negative.    Musculoskeletal: Negative for neck pain.   Skin: Negative.    Neurological: Negative for dizziness and headaches.        Objective   Vital Signs:   /90 (BP Location: Left arm, Patient Position: Sitting, Cuff Size: Adult)   Pulse 76   Temp 96.9 °F (36.1 °C) (Temporal)   Resp 20   Ht 162.6 cm (64\")   Wt 88.6 kg (195 lb 6 oz)   SpO2 99%   BMI 33.54 kg/m²       Physical Exam  Vitals and nursing note reviewed.   Constitutional:       General: She is not in acute distress.     Appearance: Normal appearance. She is obese. She is not ill-appearing.   HENT:      Head: Normocephalic and atraumatic.      Right Ear: Ear canal normal. A middle ear effusion is present. Tympanic membrane is not erythematous.      Left Ear: Tympanic membrane normal. There is impacted cerumen.      Ears:      Comments: Ear irrigation with 1:1 solution warm water/peroxide performed by nurse.  Cerumen removed without problems, tolerated well.      Nose: Congestion present.      Mouth/Throat:      Mouth: Mucous membranes are moist.      Pharynx: Oropharynx is clear. Posterior oropharyngeal erythema ( mild injection with PND) present. No oropharyngeal exudate.   Eyes:      General:         Right eye: Discharge (watery) present.         Left eye: No discharge.      Conjunctiva/sclera: Conjunctivae normal.   Cardiovascular:      Rate and Rhythm: Normal rate and regular rhythm.      Comments: No peripheral edema  Pulmonary:      Effort: Pulmonary effort is normal. No respiratory distress.      Breath sounds: Normal breath sounds. No wheezing, rhonchi or rales.      Comments: +loose cough  Musculoskeletal:      Cervical back: Neck supple. " No tenderness.   Lymphadenopathy:      Cervical: No cervical adenopathy.   Skin:     General: Skin is warm and dry.   Neurological:      General: No focal deficit present.      Mental Status: She is alert and oriented to person, place, and time.   Psychiatric:         Mood and Affect: Mood normal.         Thought Content: Thought content normal.          Result Review :              Recent Results (from the past 24 hour(s))   POC Glucose    Collection Time: 07/09/21 10:47 AM    Specimen: Blood   Result Value Ref Range    Glucose 118 70 - 130 mg/dL          Assessment and Plan    Diagnoses and all orders for this visit:    1. Acute sinusitis, recurrence not specified, unspecified location (Primary)  -     azithromycin (Zithromax) 500 MG tablet; Take 1 tablet by mouth Daily.  Dispense: 5 tablet; Refill: 0  -     predniSONE (DELTASONE) 10 MG tablet; 2 tabs po daily x 3 days, then 1 tab daily x 2 days  Dispense: 8 tablet; Refill: 0    2. Type 2 diabetes mellitus with hyperglycemia, without long-term current use of insulin (CMS/[facility])  -     POC Glucose    3. Impacted cerumen of left ear    Other orders  -     fluconazole (Diflucan) 150 MG tablet; Take 1 tablet by mouth 1 (One) Time for 1 dose. Prn yeast  Dispense: 1 tablet; Refill: 0      Push fluids  Rest  Tylenol as needed  Rx for Zithromax, Prednisone provided--cautioned she may see slight elevation in glucose levels with Prednisone  Declines Rx for nasal steroid  May continue with Claritin as needed  Would avoid OTC sinus decongestants due to HTN    Patient's Body mass index is 33.54 kg/m². indicating that she is obese (BMI >30). Obesity-related health conditions include the following: hypertension, diabetes mellitus and dyslipidemias. Obesity is newly identified. BMI is is above average; BMI management plan is completed. We discussed portion control and increasing exercise..    See PCP or RTC if symptoms persist/worsen  See PCP for routine f/u visit and management of  chronic medical conditions--has appt with Dr. Morales on 7-        This document has been electronically signed by SINAI Dodson on July 9, 2021 10:58 CDT,.

## 2021-07-15 ENCOUNTER — OFFICE VISIT (OUTPATIENT)
Dept: FAMILY MEDICINE CLINIC | Facility: CLINIC | Age: 61
End: 2021-07-15

## 2021-07-15 ENCOUNTER — LAB (OUTPATIENT)
Dept: LAB | Facility: HOSPITAL | Age: 61
End: 2021-07-15

## 2021-07-15 VITALS
SYSTOLIC BLOOD PRESSURE: 126 MMHG | HEIGHT: 64 IN | BODY MASS INDEX: 32.98 KG/M2 | HEART RATE: 91 BPM | DIASTOLIC BLOOD PRESSURE: 82 MMHG | OXYGEN SATURATION: 94 % | TEMPERATURE: 97.5 F | WEIGHT: 193.2 LBS

## 2021-07-15 DIAGNOSIS — E78.49 OTHER HYPERLIPIDEMIA: ICD-10-CM

## 2021-07-15 DIAGNOSIS — E11.65 TYPE 2 DIABETES MELLITUS WITH HYPERGLYCEMIA, WITHOUT LONG-TERM CURRENT USE OF INSULIN (HCC): ICD-10-CM

## 2021-07-15 DIAGNOSIS — E78.49 OTHER HYPERLIPIDEMIA: Primary | ICD-10-CM

## 2021-07-15 DIAGNOSIS — R13.10 DYSPHAGIA, UNSPECIFIED TYPE: ICD-10-CM

## 2021-07-15 DIAGNOSIS — I10 ESSENTIAL HYPERTENSION: ICD-10-CM

## 2021-07-15 LAB
EXPIRATION DATE: ABNORMAL
HBA1C MFR BLD: 6.4 %
Lab: ABNORMAL

## 2021-07-15 PROCEDURE — 80050 GENERAL HEALTH PANEL: CPT

## 2021-07-15 PROCEDURE — 99214 OFFICE O/P EST MOD 30 MIN: CPT | Performed by: FAMILY MEDICINE

## 2021-07-15 PROCEDURE — 81001 URINALYSIS AUTO W/SCOPE: CPT

## 2021-07-15 PROCEDURE — 83036 HEMOGLOBIN GLYCOSYLATED A1C: CPT | Performed by: FAMILY MEDICINE

## 2021-07-15 PROCEDURE — 80061 LIPID PANEL: CPT

## 2021-07-15 RX ORDER — GLIPIZIDE 10 MG/1
10 TABLET ORAL DAILY
Qty: 30 TABLET | Refills: 1 | Status: SHIPPED | OUTPATIENT
Start: 2021-07-15 | End: 2021-08-26 | Stop reason: SDUPTHER

## 2021-07-15 RX ORDER — LISINOPRIL AND HYDROCHLOROTHIAZIDE 20; 12.5 MG/1; MG/1
1 TABLET ORAL DAILY
COMMUNITY
Start: 2021-07-06 | End: 2021-07-15 | Stop reason: SDUPTHER

## 2021-07-15 RX ORDER — MULTIPLE VITAMINS W/ MINERALS TAB 9MG-400MCG
1 TAB ORAL DAILY
COMMUNITY

## 2021-07-15 RX ORDER — GLIPIZIDE 10 MG/1
10 TABLET ORAL DAILY
COMMUNITY
Start: 2021-07-06 | End: 2021-07-15 | Stop reason: SDUPTHER

## 2021-07-15 RX ORDER — LISINOPRIL AND HYDROCHLOROTHIAZIDE 20; 12.5 MG/1; MG/1
1 TABLET ORAL DAILY
Qty: 30 TABLET | Refills: 1 | Status: SHIPPED | OUTPATIENT
Start: 2021-07-15 | End: 2021-08-26 | Stop reason: SDUPTHER

## 2021-07-15 RX ORDER — LISINOPRIL 20 MG/1
20 TABLET ORAL DAILY
Qty: 30 TABLET | Refills: 1 | Status: SHIPPED | OUTPATIENT
Start: 2021-07-15 | End: 2021-08-26 | Stop reason: SDUPTHER

## 2021-07-15 RX ORDER — ATORVASTATIN CALCIUM 20 MG/1
20 TABLET, FILM COATED ORAL
Qty: 30 TABLET | Refills: 1 | Status: SHIPPED | OUTPATIENT
Start: 2021-07-15 | End: 2021-08-26 | Stop reason: SDUPTHER
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
## 2021-07-16 LAB
ALBUMIN SERPL-MCNC: 4.2 G/DL (ref 3.5–5.2)
ALBUMIN/GLOB SERPL: 1.5 G/DL
ALP SERPL-CCNC: 80 U/L (ref 39–117)
ALT SERPL W P-5'-P-CCNC: 21 U/L (ref 1–33)
ANION GAP SERPL CALCULATED.3IONS-SCNC: 13.2 MMOL/L (ref 5–15)
AST SERPL-CCNC: 18 U/L (ref 1–32)
BACTERIA UR QL AUTO: NORMAL /HPF
BASOPHILS # BLD AUTO: 0.04 10*3/MM3 (ref 0–0.2)
BASOPHILS NFR BLD AUTO: 0.5 % (ref 0–1.5)
BILIRUB SERPL-MCNC: 0.3 MG/DL (ref 0–1.2)
BILIRUB UR QL STRIP: NEGATIVE
BUN SERPL-MCNC: 11 MG/DL (ref 8–23)
BUN/CREAT SERPL: 13.6 (ref 7–25)
CALCIUM SPEC-SCNC: 9.6 MG/DL (ref 8.6–10.5)
CHLORIDE SERPL-SCNC: 101 MMOL/L (ref 98–107)
CHOLEST SERPL-MCNC: 188 MG/DL (ref 0–200)
CLARITY UR: CLEAR
CO2 SERPL-SCNC: 25.8 MMOL/L (ref 22–29)
COLOR UR: YELLOW
CREAT SERPL-MCNC: 0.81 MG/DL (ref 0.57–1)
DEPRECATED RDW RBC AUTO: 44.7 FL (ref 37–54)
EOSINOPHIL # BLD AUTO: 0.09 10*3/MM3 (ref 0–0.4)
EOSINOPHIL NFR BLD AUTO: 1 % (ref 0.3–6.2)
ERYTHROCYTE [DISTWIDTH] IN BLOOD BY AUTOMATED COUNT: 13.8 % (ref 12.3–15.4)
GFR SERPL CREATININE-BSD FRML MDRD: 87 ML/MIN/1.73
GLOBULIN UR ELPH-MCNC: 2.8 GM/DL
GLUCOSE SERPL-MCNC: 74 MG/DL (ref 65–99)
GLUCOSE UR STRIP-MCNC: NEGATIVE MG/DL
HCT VFR BLD AUTO: 36.7 % (ref 34–46.6)
HDLC SERPL-MCNC: 65 MG/DL (ref 40–60)
HGB BLD-MCNC: 11.3 G/DL (ref 12–15.9)
HGB UR QL STRIP.AUTO: NEGATIVE
HYALINE CASTS UR QL AUTO: NORMAL /LPF
IMM GRANULOCYTES # BLD AUTO: 0.02 10*3/MM3 (ref 0–0.05)
IMM GRANULOCYTES NFR BLD AUTO: 0.2 % (ref 0–0.5)
KETONES UR QL STRIP: NEGATIVE
LDLC SERPL CALC-MCNC: 107 MG/DL (ref 0–100)
LDLC/HDLC SERPL: 1.62 {RATIO}
LEUKOCYTE ESTERASE UR QL STRIP.AUTO: ABNORMAL
LYMPHOCYTES # BLD AUTO: 2.6 10*3/MM3 (ref 0.7–3.1)
LYMPHOCYTES NFR BLD AUTO: 30.1 % (ref 19.6–45.3)
MCH RBC QN AUTO: 27.5 PG (ref 26.6–33)
MCHC RBC AUTO-ENTMCNC: 30.8 G/DL (ref 31.5–35.7)
MCV RBC AUTO: 89.3 FL (ref 79–97)
MONOCYTES # BLD AUTO: 0.47 10*3/MM3 (ref 0.1–0.9)
MONOCYTES NFR BLD AUTO: 5.4 % (ref 5–12)
NEUTROPHILS NFR BLD AUTO: 5.42 10*3/MM3 (ref 1.7–7)
NEUTROPHILS NFR BLD AUTO: 62.8 % (ref 42.7–76)
NITRITE UR QL STRIP: NEGATIVE
NRBC BLD AUTO-RTO: 0 /100 WBC (ref 0–0.2)
PH UR STRIP.AUTO: 6.5 [PH] (ref 5–8)
PLATELET # BLD AUTO: 390 10*3/MM3 (ref 140–450)
PMV BLD AUTO: 10.3 FL (ref 6–12)
POTASSIUM SERPL-SCNC: 4.1 MMOL/L (ref 3.5–5.2)
PROT SERPL-MCNC: 7 G/DL (ref 6–8.5)
PROT UR QL STRIP: NEGATIVE
RBC # BLD AUTO: 4.11 10*6/MM3 (ref 3.77–5.28)
RBC # UR: NORMAL /HPF
REF LAB TEST METHOD: NORMAL
SODIUM SERPL-SCNC: 140 MMOL/L (ref 136–145)
SP GR UR STRIP: 1.01 (ref 1–1.03)
SQUAMOUS #/AREA URNS HPF: NORMAL /HPF
TRIGL SERPL-MCNC: 89 MG/DL (ref 0–150)
TSH SERPL DL<=0.05 MIU/L-ACNC: 0.35 UIU/ML (ref 0.27–4.2)
UROBILINOGEN UR QL STRIP: ABNORMAL
VLDLC SERPL-MCNC: 16 MG/DL (ref 5–40)
WBC # BLD AUTO: 8.64 10*3/MM3 (ref 3.4–10.8)
WBC UR QL AUTO: NORMAL /HPF

## 2021-07-20 ENCOUNTER — TELEPHONE (OUTPATIENT)
Dept: FAMILY MEDICINE CLINIC | Facility: CLINIC | Age: 61
End: 2021-07-20

## 2021-07-20 NOTE — TELEPHONE ENCOUNTER
----- Message from Micheal Morales MD sent at 7/18/2021 11:09 AM CDT -----  Labs are OK will go over at next visit.

## 2021-07-21 ENCOUNTER — PRIOR AUTHORIZATION (OUTPATIENT)
Dept: FAMILY MEDICINE CLINIC | Facility: CLINIC | Age: 61
End: 2021-07-21

## 2021-07-23 ENCOUNTER — HOSPITAL ENCOUNTER (OUTPATIENT)
Facility: HOSPITAL | Age: 61
Setting detail: HOSPITAL OUTPATIENT SURGERY
End: 2021-07-23
Attending: INTERNAL MEDICINE | Admitting: INTERNAL MEDICINE

## 2021-07-23 ENCOUNTER — OFFICE VISIT (OUTPATIENT)
Dept: GASTROENTEROLOGY | Facility: CLINIC | Age: 61
End: 2021-07-23

## 2021-07-23 VITALS
BODY MASS INDEX: 33.6 KG/M2 | DIASTOLIC BLOOD PRESSURE: 83 MMHG | SYSTOLIC BLOOD PRESSURE: 114 MMHG | HEART RATE: 87 BPM | WEIGHT: 196.8 LBS | HEIGHT: 64 IN

## 2021-07-23 DIAGNOSIS — R10.33 PERIUMBILICAL ABDOMINAL PAIN: ICD-10-CM

## 2021-07-23 DIAGNOSIS — D50.8 OTHER IRON DEFICIENCY ANEMIA: ICD-10-CM

## 2021-07-23 DIAGNOSIS — R19.7 DIARRHEA, UNSPECIFIED TYPE: ICD-10-CM

## 2021-07-23 DIAGNOSIS — K21.9 GASTROESOPHAGEAL REFLUX DISEASE, UNSPECIFIED WHETHER ESOPHAGITIS PRESENT: Primary | ICD-10-CM

## 2021-07-23 DIAGNOSIS — Z12.11 SCREEN FOR COLON CANCER: ICD-10-CM

## 2021-07-23 DIAGNOSIS — R13.10 DYSPHAGIA, UNSPECIFIED TYPE: ICD-10-CM

## 2021-07-23 PROBLEM — D64.9 ABSOLUTE ANEMIA: Status: ACTIVE | Noted: 2021-07-23

## 2021-07-23 PROCEDURE — 99244 OFF/OP CNSLTJ NEW/EST MOD 40: CPT | Performed by: PHYSICIAN ASSISTANT

## 2021-07-23 RX ORDER — DEXTROSE AND SODIUM CHLORIDE 5; .45 G/100ML; G/100ML
30 INJECTION, SOLUTION INTRAVENOUS CONTINUOUS PRN
Status: CANCELLED | OUTPATIENT
Start: 2021-09-17

## 2021-08-01 RX ORDER — SODIUM, POTASSIUM,MAG SULFATES 17.5-3.13G
SOLUTION, RECONSTITUTED, ORAL ORAL
Qty: 354 ML | Refills: 0 | Status: SHIPPED | OUTPATIENT
Start: 2021-08-01 | End: 2021-09-08

## 2021-08-26 ENCOUNTER — OFFICE VISIT (OUTPATIENT)
Dept: FAMILY MEDICINE CLINIC | Facility: CLINIC | Age: 61
End: 2021-08-26

## 2021-08-26 VITALS
OXYGEN SATURATION: 92 % | WEIGHT: 196.2 LBS | SYSTOLIC BLOOD PRESSURE: 126 MMHG | DIASTOLIC BLOOD PRESSURE: 72 MMHG | BODY MASS INDEX: 33.49 KG/M2 | HEART RATE: 89 BPM | TEMPERATURE: 97.1 F | HEIGHT: 64 IN

## 2021-08-26 DIAGNOSIS — E11.65 TYPE 2 DIABETES MELLITUS WITH HYPERGLYCEMIA, WITHOUT LONG-TERM CURRENT USE OF INSULIN (HCC): ICD-10-CM

## 2021-08-26 DIAGNOSIS — I10 ESSENTIAL HYPERTENSION: ICD-10-CM

## 2021-08-26 DIAGNOSIS — M79.672 PAIN OF LEFT HEEL: ICD-10-CM

## 2021-08-26 DIAGNOSIS — E78.49 OTHER HYPERLIPIDEMIA: ICD-10-CM

## 2021-08-26 PROCEDURE — 99214 OFFICE O/P EST MOD 30 MIN: CPT | Performed by: FAMILY MEDICINE

## 2021-08-26 RX ORDER — DAPAGLIFLOZIN 10 MG/1
1 TABLET, FILM COATED ORAL DAILY
Qty: 30 TABLET | Refills: 3 | Status: SHIPPED | OUTPATIENT
Start: 2021-08-26 | End: 2021-10-19

## 2021-08-26 RX ORDER — KRILL/OM-3/DHA/EPA/PHOSPHO/AST 500MG-86MG
1 CAPSULE ORAL DAILY
Qty: 30 CAPSULE | Refills: 2 | Status: SHIPPED | OUTPATIENT
Start: 2021-08-26 | End: 2021-10-19

## 2021-08-26 RX ORDER — LISINOPRIL 20 MG/1
20 TABLET ORAL DAILY
Qty: 30 TABLET | Refills: 3 | Status: SHIPPED | OUTPATIENT
Start: 2021-08-26 | End: 2021-11-18 | Stop reason: SDUPTHER

## 2021-08-26 RX ORDER — GLIPIZIDE 10 MG/1
10 TABLET ORAL DAILY
Qty: 30 TABLET | Refills: 3 | Status: SHIPPED | OUTPATIENT
Start: 2021-08-26 | End: 2021-11-18

## 2021-08-26 RX ORDER — DAPAGLIFLOZIN 10 MG/1
1 TABLET, FILM COATED ORAL DAILY
Qty: 30 TABLET | Refills: 3 | Status: SHIPPED | OUTPATIENT
Start: 2021-08-26 | End: 2021-08-26 | Stop reason: SDUPTHER

## 2021-08-26 RX ORDER — ATORVASTATIN CALCIUM 20 MG/1
20 TABLET, FILM COATED ORAL
Qty: 30 TABLET | Refills: 3 | Status: SHIPPED | OUTPATIENT
Start: 2021-08-26 | End: 2021-11-18 | Stop reason: SDUPTHER

## 2021-08-26 RX ORDER — LISINOPRIL AND HYDROCHLOROTHIAZIDE 20; 12.5 MG/1; MG/1
1 TABLET ORAL DAILY
Qty: 30 TABLET | Refills: 3 | Status: SHIPPED | OUTPATIENT
Start: 2021-08-26 | End: 2021-11-18 | Stop reason: SDUPTHER
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
## 2021-08-27 ENCOUNTER — TELEPHONE (OUTPATIENT)
Dept: FAMILY MEDICINE CLINIC | Facility: CLINIC | Age: 61
End: 2021-08-27

## 2021-08-27 NOTE — TELEPHONE ENCOUNTER
----- Message from Micheal Morales MD sent at 8/27/2021  1:04 PM CDT -----  Has a heel spur. Otherwise OK.

## 2021-08-29 PROBLEM — M79.672 PAIN OF LEFT HEEL: Status: ACTIVE | Noted: 2021-08-29

## 2021-09-14 ENCOUNTER — LAB (OUTPATIENT)
Dept: LAB | Facility: HOSPITAL | Age: 61
End: 2021-09-14

## 2021-09-14 DIAGNOSIS — Z01.818 PREOP TESTING: Primary | ICD-10-CM

## 2021-09-14 LAB — SARS-COV-2 N GENE RESP QL NAA+PROBE: NOT DETECTED

## 2021-09-14 PROCEDURE — C9803 HOPD COVID-19 SPEC COLLECT: HCPCS

## 2021-09-14 PROCEDURE — 87635 SARS-COV-2 COVID-19 AMP PRB: CPT

## 2021-09-22 ENCOUNTER — CLINICAL SUPPORT (OUTPATIENT)
Dept: FAMILY MEDICINE CLINIC | Facility: CLINIC | Age: 61
End: 2021-09-22

## 2021-09-22 DIAGNOSIS — Z23 NEED FOR IMMUNIZATION AGAINST INFLUENZA: Primary | ICD-10-CM

## 2021-09-22 PROCEDURE — 90471 IMMUNIZATION ADMIN: CPT | Performed by: NURSE PRACTITIONER

## 2021-09-22 PROCEDURE — 90686 IIV4 VACC NO PRSV 0.5 ML IM: CPT | Performed by: NURSE PRACTITIONER

## 2021-09-22 NOTE — PROGRESS NOTES
Injection  Injection performed in right deltoid by Nickie Oconnor MA. Patient tolerated the procedure well without complications.  09/22/21   Nickie Oconnor MA

## 2021-09-27 ENCOUNTER — LAB (OUTPATIENT)
Dept: LAB | Facility: HOSPITAL | Age: 61
End: 2021-09-27

## 2021-09-27 DIAGNOSIS — Z01.818 PREOP TESTING: Primary | ICD-10-CM

## 2021-09-27 LAB — SARS-COV-2 N GENE RESP QL NAA+PROBE: NOT DETECTED

## 2021-09-27 PROCEDURE — C9803 HOPD COVID-19 SPEC COLLECT: HCPCS

## 2021-09-27 PROCEDURE — 87635 SARS-COV-2 COVID-19 AMP PRB: CPT

## 2021-09-28 ENCOUNTER — ANESTHESIA EVENT (OUTPATIENT)
Dept: GASTROENTEROLOGY | Facility: HOSPITAL | Age: 61
End: 2021-09-28

## 2021-09-28 ENCOUNTER — HOSPITAL ENCOUNTER (OUTPATIENT)
Facility: HOSPITAL | Age: 61
Setting detail: HOSPITAL OUTPATIENT SURGERY
Discharge: HOME | End: 2021-09-28
Attending: INTERNAL MEDICINE | Admitting: INTERNAL MEDICINE

## 2021-09-28 ENCOUNTER — ANESTHESIA (OUTPATIENT)
Dept: GASTROENTEROLOGY | Facility: HOSPITAL | Age: 61
End: 2021-09-28

## 2021-09-28 VITALS
OXYGEN SATURATION: 99 % | HEART RATE: 92 BPM | HEIGHT: 65 IN | BODY MASS INDEX: 31.96 KG/M2 | RESPIRATION RATE: 20 BRPM | WEIGHT: 191.8 LBS | DIASTOLIC BLOOD PRESSURE: 57 MMHG | SYSTOLIC BLOOD PRESSURE: 106 MMHG

## 2021-09-28 DIAGNOSIS — K21.9 GASTROESOPHAGEAL REFLUX DISEASE, UNSPECIFIED WHETHER ESOPHAGITIS PRESENT: ICD-10-CM

## 2021-09-28 DIAGNOSIS — R13.10 DYSPHAGIA, UNSPECIFIED TYPE: ICD-10-CM

## 2021-09-28 DIAGNOSIS — R19.7 DIARRHEA, UNSPECIFIED TYPE: ICD-10-CM

## 2021-09-28 DIAGNOSIS — R10.33 PERIUMBILICAL ABDOMINAL PAIN: ICD-10-CM

## 2021-09-28 DIAGNOSIS — Z12.11 SCREEN FOR COLON CANCER: ICD-10-CM

## 2021-09-28 DIAGNOSIS — D50.8 OTHER IRON DEFICIENCY ANEMIA: ICD-10-CM

## 2021-09-28 LAB — GLUCOSE BLDC GLUCOMTR-MCNC: 119 MG/DL (ref 70–130)

## 2021-09-28 PROCEDURE — 43239 EGD BIOPSY SINGLE/MULTIPLE: CPT | Performed by: INTERNAL MEDICINE

## 2021-09-28 PROCEDURE — 25010000002 PROPOFOL 10 MG/ML EMULSION: Performed by: NURSE ANESTHETIST, CERTIFIED REGISTERED

## 2021-09-28 PROCEDURE — 45380 COLONOSCOPY AND BIOPSY: CPT | Performed by: INTERNAL MEDICINE

## 2021-09-28 PROCEDURE — C1769 GUIDE WIRE: HCPCS | Performed by: INTERNAL MEDICINE

## 2021-09-28 PROCEDURE — 82962 GLUCOSE BLOOD TEST: CPT

## 2021-09-28 PROCEDURE — 43248 EGD GUIDE WIRE INSERTION: CPT | Performed by: INTERNAL MEDICINE

## 2021-09-28 PROCEDURE — 88305 TISSUE EXAM BY PATHOLOGIST: CPT

## 2021-09-28 RX ORDER — DEXTROSE AND SODIUM CHLORIDE 5; .45 G/100ML; G/100ML
20 INJECTION, SOLUTION INTRAVENOUS CONTINUOUS
Status: DISCONTINUED | OUTPATIENT
Start: 2021-09-28 | End: 2021-09-28 | Stop reason: HOSPADM

## 2021-09-28 RX ORDER — LIDOCAINE HYDROCHLORIDE 20 MG/ML
INJECTION, SOLUTION EPIDURAL; INFILTRATION; INTRACAUDAL; PERINEURAL AS NEEDED
Status: DISCONTINUED | OUTPATIENT
Start: 2021-09-28 | End: 2021-09-28 | Stop reason: SURG

## 2021-09-28 RX ORDER — PROPOFOL 10 MG/ML
VIAL (ML) INTRAVENOUS AS NEEDED
Status: DISCONTINUED | OUTPATIENT
Start: 2021-09-28 | End: 2021-09-28 | Stop reason: SURG

## 2021-09-28 RX ADMIN — LIDOCAINE HYDROCHLORIDE 50 MG: 20 INJECTION, SOLUTION EPIDURAL; INFILTRATION; INTRACAUDAL; PERINEURAL at 12:55

## 2021-09-28 RX ADMIN — PROPOFOL 70 MG: 10 INJECTION, EMULSION INTRAVENOUS at 12:55

## 2021-09-28 RX ADMIN — PROPOFOL 30 MG: 10 INJECTION, EMULSION INTRAVENOUS at 13:12

## 2021-09-28 RX ADMIN — PROPOFOL 20 MG: 10 INJECTION, EMULSION INTRAVENOUS at 13:10

## 2021-09-28 RX ADMIN — PROPOFOL 30 MG: 10 INJECTION, EMULSION INTRAVENOUS at 13:00

## 2021-09-28 RX ADMIN — PROPOFOL 30 MG: 10 INJECTION, EMULSION INTRAVENOUS at 12:57

## 2021-09-28 RX ADMIN — PROPOFOL 30 MG: 10 INJECTION, EMULSION INTRAVENOUS at 13:03

## 2021-09-28 RX ADMIN — PROPOFOL 30 MG: 10 INJECTION, EMULSION INTRAVENOUS at 13:05

## 2021-09-28 RX ADMIN — PROPOFOL 30 MG: 10 INJECTION, EMULSION INTRAVENOUS at 13:08

## 2021-09-28 RX ADMIN — DEXTROSE AND SODIUM CHLORIDE 20 ML/HR: 5; 450 INJECTION, SOLUTION INTRAVENOUS at 11:34

## 2021-09-28 NOTE — ANESTHESIA PREPROCEDURE EVALUATION
Anesthesia Evaluation     Patient summary reviewed and Nursing notes reviewed   NPO Solid Status: > 8 hours  NPO Liquid Status: > 8 hours           Airway   Mallampati: II  TM distance: >3 FB  Neck ROM: full  No difficulty expected  Dental    (+) poor dentition    Pulmonary - normal exam   Cardiovascular - normal exam    (+) hypertension, valvular problems/murmurs MVP, hyperlipidemia,       Neuro/Psych  (+) headaches, numbness,     GI/Hepatic/Renal/Endo    (+)   diabetes mellitus type 2,     Musculoskeletal     (+) back pain, neck pain, radiculopathy  Abdominal  - normal exam   Substance History      OB/GYN          Other                        Anesthesia Plan    ASA 3     MAC     intravenous induction     Anesthetic plan, all risks, benefits, and alternatives have been provided, discussed and informed consent has been obtained with: patient.

## 2021-09-28 NOTE — ANESTHESIA POSTPROCEDURE EVALUATION
Patient: Ines Saravia    Procedure Summary     Date: 09/28/21 Room / Location: [facility] ENDOSCOPY 1 / [facility] ENDOSCOPY    Anesthesia Start: 1249 Anesthesia Stop: 1314    Procedures:       ESOPHAGOGASTRODUODENOSCOPY WITH DILATATION (N/A )      COLONOSCOPY (N/A ) Diagnosis:       Gastroesophageal reflux disease, unspecified whether esophagitis present      Dysphagia, unspecified type      Periumbilical abdominal pain      Diarrhea, unspecified type      Other iron deficiency anemia      Screen for colon cancer      (Gastroesophageal reflux disease, unspecified whether esophagitis present [K21.9])      (Dysphagia, unspecified type [R13.10])      (Periumbilical abdominal pain [R10.33])      (Diarrhea, unspecified type [R19.7])      (Other iron deficiency anemia [D50.8])      (Screen for colon cancer [Z12.11])    Surgeons: Humphrey Campbell MD Provider: Elmer Gastelum CRNA    Anesthesia Type: MAC ASA Status: 3          Anesthesia Type: MAC    Vitals  No vitals data found for the desired time range.          Post Anesthesia Care and Evaluation    Patient location during evaluation: bedside  Patient participation: complete - patient participated  Level of consciousness: awake and awake and alert  Pain score: 0  Pain management: adequate  Airway patency: patent  Anesthetic complications: No anesthetic complications  PONV Status: none  Cardiovascular status: acceptable and stable  Respiratory status: acceptable, room air and spontaneous ventilation  Hydration status: acceptable    Comments: BP:  119/64  HR:  93  SAT:  98  RR:  18  TEMP:  97.0

## 2021-09-29 ENCOUNTER — TELEPHONE (OUTPATIENT)
Dept: FAMILY MEDICINE CLINIC | Facility: CLINIC | Age: 61
End: 2021-09-29

## 2021-09-29 NOTE — TELEPHONE ENCOUNTER
Have sent message to PCP, waiting to hear back from him about which one she is needing at this time. Left voice message for pt to again let her know waiting to hear back from PCP and will let her know when he responds. She does have to wait 2 weeks after her flu shot regardless.

## 2021-09-29 NOTE — TELEPHONE ENCOUNTER
Patient called she needs a pneumonia shot. The nurse was suppose to call her when she found out what kind she needed. She said that she wouldn't get a call back and hung up.

## 2021-09-30 LAB
LAB AP CASE REPORT: NORMAL
PATH REPORT.FINAL DX SPEC: NORMAL

## 2021-10-19 ENCOUNTER — OFFICE VISIT (OUTPATIENT)
Dept: GASTROENTEROLOGY | Facility: CLINIC | Age: 61
End: 2021-10-19

## 2021-10-19 VITALS
DIASTOLIC BLOOD PRESSURE: 89 MMHG | HEIGHT: 65 IN | WEIGHT: 190 LBS | HEART RATE: 92 BPM | SYSTOLIC BLOOD PRESSURE: 130 MMHG | BODY MASS INDEX: 31.65 KG/M2

## 2021-10-19 DIAGNOSIS — R19.7 DIARRHEA, UNSPECIFIED TYPE: ICD-10-CM

## 2021-10-19 DIAGNOSIS — R13.10 DYSPHAGIA, UNSPECIFIED TYPE: ICD-10-CM

## 2021-10-19 DIAGNOSIS — D50.8 OTHER IRON DEFICIENCY ANEMIA: ICD-10-CM

## 2021-10-19 DIAGNOSIS — K21.00 GASTROESOPHAGEAL REFLUX DISEASE WITH ESOPHAGITIS WITHOUT HEMORRHAGE: ICD-10-CM

## 2021-10-19 DIAGNOSIS — K22.2 PEPTIC STRICTURE OF ESOPHAGUS: Primary | ICD-10-CM

## 2021-10-19 PROCEDURE — 99214 OFFICE O/P EST MOD 30 MIN: CPT | Performed by: PHYSICIAN ASSISTANT

## 2021-10-19 RX ORDER — ESOMEPRAZOLE MAGNESIUM 40 MG/1
40 CAPSULE, DELAYED RELEASE ORAL DAILY
Qty: 30 CAPSULE | Refills: 3 | Status: SHIPPED | OUTPATIENT
Start: 2021-10-19 | End: 2022-02-18 | Stop reason: SDUPTHER

## 2021-11-16 ENCOUNTER — OFFICE VISIT (OUTPATIENT)
Dept: GASTROENTEROLOGY | Facility: CLINIC | Age: 61
End: 2021-11-16

## 2021-11-16 VITALS
DIASTOLIC BLOOD PRESSURE: 75 MMHG | BODY MASS INDEX: 31.99 KG/M2 | HEART RATE: 82 BPM | SYSTOLIC BLOOD PRESSURE: 110 MMHG | HEIGHT: 65 IN | WEIGHT: 192 LBS

## 2021-11-16 DIAGNOSIS — K21.00 GASTROESOPHAGEAL REFLUX DISEASE WITH ESOPHAGITIS WITHOUT HEMORRHAGE: ICD-10-CM

## 2021-11-16 DIAGNOSIS — R10.10 PAIN OF UPPER ABDOMEN: ICD-10-CM

## 2021-11-16 DIAGNOSIS — R13.10 DYSPHAGIA, UNSPECIFIED TYPE: Primary | ICD-10-CM

## 2021-11-16 PROCEDURE — 99213 OFFICE O/P EST LOW 20 MIN: CPT | Performed by: PHYSICIAN ASSISTANT

## 2021-11-16 NOTE — PROGRESS NOTES
Chief Complaint   Patient presents with   • Difficulty Swallowing   • Anemia   • Heartburn       ENDO PROCEDURE ORDERED:    Subjective    Ines Saravia is a 61 y.o. female. she is here today for follow-up.    History of Present Illness    Patient is seen on a recheck of her GERD, dysphagia, anemia. Last seen on 10/19/2021 and was started on Nexium. She states she is doing better. She still feels like things get hung, even saliva. It does not occur every time she tries to swallow. She is still having a lot of nausea. Bowels are moving without blood or mucus. She has been able to eat more regularly and her weight is up 2 pounds since last visit. Last EGD/colonoscopy on 09/28/2021 showed esophageal stricture, esophagitis, gastritis, hyperplastic polyp removed from the colon.     ASSESSMENT/PLAN:  Patient with persistent GERD, dysphagia and nausea. Recommended esophagram to further evaluate her motility. She may require repeat dilatation. She was reluctant to undergo that so soon. We will continue on the Nexium, avoiding gastric irritants. We will plan followup after the above, further pending clinical course and the results of the above.      The following portions of the patient's history were reviewed and updated as appropriate:   Past Medical History:   Diagnosis Date   • Abnormal mammogram of right breast     right breast density Spot compression negative 2/3/16      • Benign essential hypertension    • Continuous leakage of urine    • Dermatitis    • Diabetes mellitus (HCC)    • Diabetes mellitus screening    • Dysuria    • Encounter for gynecological examination with abnormal finding    • Generalized headache    • H/O screening mammography    • Impacted cerumen, bilateral    • Intermittent urinary incontinence    • Localized swelling, mass and lump, head     and neck   • Mitral valve prolapse    • Neck pain    • Neck swelling    • Neuropathy     Followed by Neurology      • Overweight    • Pain    •  "Patient's noncompliance with other medical treatment and regimen    • Radiculopathy, cervical region     Followed by Neurology      • Screening for hyperlipidemia    • Stiffness of joint     not elsewhere classified, involving hand      • Thyroid disorder screening    • Vaginal discharge      Past Surgical History:   Procedure Laterality Date   • COLONOSCOPY N/A 9/28/2021    Procedure: COLONOSCOPY;  Surgeon: Humphrey Campbell MD;  Location: [facility] ENDOSCOPY;  Service: Gastroenterology;  Laterality: N/A;   • ENDOSCOPY N/A 9/28/2021    Procedure: ESOPHAGOGASTRODUODENOSCOPY WITH DILATATION;  Surgeon: Humphrey Campbell MD;  Location: [facility] ENDOSCOPY;  Service: Gastroenterology;  Laterality: N/A;   • OTHER SURGICAL HISTORY  12/19/2015    Remove Impacted Cerumen    • TUBAL ABDOMINAL LIGATION       Family History   Problem Relation Age of Onset   • Diabetes Other    • Hypertension Other    • Heart disease Other    • Hyperlipidemia Other      OB History    No obstetric history on file.       Allergies   Allergen Reactions   • Penicillins Other (See Comments)     \"knots\" on skin     Social History     Socioeconomic History   • Marital status:    Tobacco Use   • Smoking status: Never Smoker   • Smokeless tobacco: Current User   • Tobacco comment: Current smokeless user; E-cig   Vaping Use   • Vaping Use: Every day   • Substances: Nicotine, Flavoring   • Devices: Refillable tank   Substance and Sexual Activity   • Alcohol use: No   • Drug use: No   • Sexual activity: Defer     Current Medications:  Prior to Admission medications    Medication Sig Start Date End Date Taking? Authorizing Provider   atorvastatin (LIPITOR) 20 MG tablet Take 1 tablet by mouth every night at bedtime. 8/26/21  Yes Micheal Morales MD   esomeprazole (nexIUM) 40 MG capsule Take 1 capsule by mouth Daily. 10/19/21  Yes Jens Mustafa PA-C   glipizide (GLUCOTROL) 10 MG tablet Take 1 tablet by mouth Daily. 8/26/21  Yes Micheal Morales" "MD   lisinopril (PRINIVIL,ZESTRIL) 20 MG tablet Take 1 tablet by mouth Daily. 8/26/21  Yes Micheal Morales MD   lisinopril-hydrochlorothiazide (PRINZIDE,ZESTORETIC) 20-12.5 MG per tablet Take 1 tablet by mouth Daily. 8/26/21  Yes Micheal Morales MD   metFORMIN (GLUCOPHAGE) 1000 MG tablet Take 1 tablet by mouth 2 (Two) Times a Day. 8/26/21  Yes Micheal Morales MD   multivitamin with minerals (CENTRUM SILVER PO) Take 1 tablet by mouth Daily.   Yes Provider, MD Pete     Review of Systems  Review of Systems       Objective    /75   Pulse 82   Ht 165.1 cm (65\")   Wt 87.1 kg (192 lb)   BMI 31.95 kg/m²   Physical Exam  Vitals and nursing note reviewed.   Constitutional:       General: She is not in acute distress.     Appearance: She is well-developed.      Comments: AA   HENT:      Head: Normocephalic and atraumatic.   Eyes:      Pupils: Pupils are equal, round, and reactive to light.   Cardiovascular:      Rate and Rhythm: Normal rate and regular rhythm.      Heart sounds: Normal heart sounds.   Pulmonary:      Effort: Pulmonary effort is normal.      Breath sounds: Normal breath sounds.   Abdominal:      General: Bowel sounds are normal. There is no distension or abdominal bruit.      Palpations: Abdomen is soft. Abdomen is not rigid. There is no shifting dullness or mass.      Tenderness: There is abdominal tenderness. There is no guarding or rebound.      Hernia: No hernia is present. There is no hernia in the ventral area.   Musculoskeletal:         General: Normal range of motion.      Cervical back: Normal range of motion.   Skin:     General: Skin is warm and dry.   Neurological:      Mental Status: She is alert and oriented to person, place, and time.   Psychiatric:         Behavior: Behavior normal.         Thought Content: Thought content normal.         Judgment: Judgment normal.       Assessment/Plan      1. Dysphagia, unspecified type    2. Gastroesophageal reflux disease " with esophagitis without hemorrhage    3. Pain of upper abdomen    .   Diagnoses and all orders for this visit:    1. Dysphagia, unspecified type (Primary)  -     FL Esophagram Complete Single Contrast    2. Gastroesophageal reflux disease with esophagitis without hemorrhage  -     FL Esophagram Complete Single Contrast    3. Pain of upper abdomen  -     FL Esophagram Complete Single Contrast        Orders placed during this encounter include:  Orders Placed This Encounter   Procedures   • FL Esophagram Complete Single Contrast     Order Specific Question:   Reason for Exam:     Answer:   dysphagia, gerd, hx stricture       Medications prescribed:  No orders of the defined types were placed in this encounter.      Requested Prescriptions      No prescriptions requested or ordered in this encounter       Review and/or summary of lab tests, radiology, procedures, medications. Review and summary of old records and obtaining of history. The risks and benefits of my recommendations, as well as other treatment options were discussed with the patient today. Questions were answered.    Follow-up: Return in about 1 month (around 12/16/2021), or if symptoms worsen or fail to improve.     * Surgery not found *      This document has been electronically signed by Jens Mustafa PA-C on November 29, 2021 17:13 CST      Results for orders placed or performed in visit on 11/18/21   POC Glycated Hemoglobin, Total    Specimen: Blood   Result Value Ref Range    Hemoglobin A1C 7.3 %    Lot Number [id number]     Expiration Date 6,[id number]    Results for orders placed or performed during the hospital encounter of 09/28/21   Tissue Pathology Exam    Specimen: A: Small Intestine, Duodenum; Tissue    B: Gastric, Antrum; Tissue    C: Esophagus, Distal; Tissue    D: Large Intestine, Sigmoid Colon; Tissue   Result Value Ref Range    Case Report       Surgical Pathology Report                         Case: [id number]                                   Authorizing Provider:  Humphrey Campbell MD        Collected:           09/28/2021 01:03 PM          Ordering Location:     [facility]   Received:            09/28/2021 01:53 PM                                 [facility]                                                     Pathologist:           Jamie Torres MD                                                           Specimens:   1) - Small Intestine, Duodenum                                                                      2) - Gastric, Antrum                                                                                3) - Esophagus, Distal                                                                              4) - Large Intestine, Sigmoid Colon, polyp MM                                              Final Diagnosis       SEE SCANNED REPORT       POC Glucose Once    Specimen: Blood   Result Value Ref Range    Glucose 119 70 - 130 mg/dL   Results for orders placed or performed in visit on 09/27/21   COVID-19, [facility] IN-HOUSE, NP SWAB IN TRANSPORT MEDIA 8-10 HR TAT - Swab, Nasopharynx    Specimen: Nasopharynx; Swab   Result Value Ref Range    COVID19 Not Detected Not Detected - Ref. Range   Results for orders placed or performed in visit on 09/14/21   COVID-19, [facility]-HOUSE, NP SWAB IN TRANSPORT MEDIA 8-10 HR TAT - Swab, Nasopharynx    Specimen: Nasopharynx; Swab   Result Value Ref Range    COVID19 Not Detected Not Detected - Ref. Range   Results for orders placed or performed in visit on 07/15/21   Urinalysis, Microscopic Only - Urine, Clean Catch    Specimen: Urine, Clean Catch   Result Value Ref Range    RBC, UA 0-2 None Seen, 0-2 /HPF    WBC, UA 0-2 None Seen, 0-2 /HPF    Bacteria, UA None Seen None Seen /HPF    Squamous Epithelial Cells, UA 0-2 None Seen, 0-2 /HPF    Hyaline Casts, UA None Seen None Seen /LPF    Methodology Automated Microscopy    Urinalysis With Culture If Indicated - Urine, Clean  Catch    Specimen: Urine, Clean Catch   Result Value Ref Range    Color, UA Yellow Yellow, Straw    Appearance, UA Clear Clear    pH, UA 6.5 5.0 - 8.0    Specific Gravity, UA 1.015 1.005 - 1.030    Glucose, UA Negative Negative    Ketones, UA Negative Negative    Bilirubin, UA Negative Negative    Blood, UA Negative Negative    Protein, UA Negative Negative    Leuk Esterase, UA Small (1+) (A) Negative    Nitrite, UA Negative Negative    Urobilinogen, UA 1.0 E.U./dL 0.2 - 1.0 E.U./dL   CBC Auto Differential    Specimen: Blood   Result Value Ref Range    WBC 8.64 3.40 - 10.80 10*3/mm3    RBC 4.11 3.77 - 5.28 10*6/mm3    Hemoglobin 11.3 (L) 12.0 - 15.9 g/dL    Hematocrit 36.7 34.0 - 46.6 %    MCV 89.3 79.0 - 97.0 fL    MCH 27.5 26.6 - 33.0 pg    MCHC 30.8 (L) 31.5 - 35.7 g/dL    RDW 13.8 12.3 - 15.4 %    RDW-SD 44.7 37.0 - 54.0 fl    MPV 10.3 6.0 - 12.0 fL    Platelets 390 140 - 450 10*3/mm3    Neutrophil % 62.8 42.7 - 76.0 %    Lymphocyte % 30.1 19.6 - 45.3 %    Monocyte % 5.4 5.0 - 12.0 %    Eosinophil % 1.0 0.3 - 6.2 %    Basophil % 0.5 0.0 - 1.5 %    Immature Grans % 0.2 0.0 - 0.5 %    Neutrophils, Absolute 5.42 1.70 - 7.00 10*3/mm3    Lymphocytes, Absolute 2.60 0.70 - 3.10 10*3/mm3    Monocytes, Absolute 0.47 0.10 - 0.90 10*3/mm3    Eosinophils, Absolute 0.09 0.00 - 0.40 10*3/mm3    Basophils, Absolute 0.04 0.00 - 0.20 10*3/mm3    Immature Grans, Absolute 0.02 0.00 - 0.05 10*3/mm3    nRBC 0.0 0.0 - 0.2 /100 WBC   TSH    Specimen: Blood   Result Value Ref Range    TSH 0.351 0.270 - 4.200 uIU/mL   Lipid Panel    Specimen: Blood   Result Value Ref Range    Total Cholesterol 188 0 - 200 mg/dL    Triglycerides 89 0 - 150 mg/dL    HDL Cholesterol 65 (H) 40 - 60 mg/dL    LDL Cholesterol  107 (H) 0 - 100 mg/dL    VLDL Cholesterol 16 5 - 40 mg/dL    LDL/HDL Ratio 1.62    Comprehensive metabolic panel    Specimen: Blood   Result Value Ref Range    Glucose 74 65 - 99 mg/dL    BUN 11 8 - 23 mg/dL    Creatinine 0.81 0.57 -  1.00 mg/dL    Sodium 140 136 - 145 mmol/L    Potassium 4.1 3.5 - 5.2 mmol/L    Chloride 101 98 - 107 mmol/L    CO2 25.8 22.0 - 29.0 mmol/L    Calcium 9.6 8.6 - 10.5 mg/dL    Total Protein 7.0 6.0 - 8.5 g/dL    Albumin 4.20 3.50 - 5.20 g/dL    ALT (SGPT) 21 1 - 33 U/L    AST (SGOT) 18 1 - 32 U/L    Alkaline Phosphatase 80 39 - 117 U/L    Total Bilirubin 0.3 0.0 - 1.2 mg/dL    eGFR  African Amer 87 >60 mL/min/1.73    Globulin 2.8 gm/dL    A/G Ratio 1.5 g/dL    BUN/Creatinine Ratio 13.6 7.0 - 25.0    Anion Gap 13.2 5.0 - 15.0 mmol/L   Results for orders placed or performed in visit on 07/15/21   POC Glycated Hemoglobin, Total    Specimen: Blood   Result Value Ref Range    Hemoglobin A1C 6.4 %    Lot Number [id number]     Expiration Date 9,[id number]    Results for orders placed or performed in visit on 07/09/21   POC Glucose    Specimen: Blood   Result Value Ref Range    Glucose 118 70 - 130 mg/dL   Results for orders placed or performed in visit on 08/21/18   Treadmill Stress Test   Result Value Ref Range    BH CV STRESS PROTOCOL 1 Karthik     Stage 1 1     HR Stage 1 101     BP Stage 1 151/100     Duration Min Stage 1 3     Duration Sec Stage 1 0     Grade Stage 1 10     Speed Stage 1 1.7     BH CV STRESS METS STAGE 1 5     Stage 2 2     HR Stage 2 120     BP Stage 2 208/108     Duration Min Stage 2 3     Duration Sec Stage 2 0     Grade Stage 2 12     Speed Stage 2 2.5     BH CV STRESS METS STAGE 2 7.5     Stage 3 3     HR Stage 3 157     BP Stage 3 212/112     Duration Min Stage 3 3     Duration Sec Stage 3 0     Grade Stage 3 14     Speed Stage 3 3.4     BH CV STRESS METS STAGE 3 10.0     Baseline HR 72 bpm    Baseline BP 1,541/100 mmHg    Peak  bpm    Percent Max Pred HR 99.38 %    Percent Target  %    Peak /112 mmHg    Recovery HR 84 bpm    Recovery /83 mmHg    Target HR (85%) 137 bpm    Max. Pred. HR (100%) 161 bpm    Exercise duration (min) 8 min    Exercise duration (sec) 59 sec    Estimated  workload 10.1 METS     *Note: Due to a large number of results and/or encounters for the requested time period, some results have not been displayed. A complete set of results can be found in Results Review.

## 2021-11-16 NOTE — PATIENT INSTRUCTIONS
MyPlate from USDA    MyPlate is an outline of a general healthy diet based on the 2010 Dietary Guidelines for Americans, from the U.S. Department of Agriculture (USDA). It sets guidelines for how much food you should eat from each food group based on your age, sex, and level of physical activity.  What are tips for following MyPlate?  To follow MyPlate recommendations:  · Eat a wide variety of fruits and vegetables, grains, and protein foods.  · Serve smaller portions and eat less food throughout the day.  · Limit portion sizes to avoid overeating.  · Enjoy your food.  · Get at least 150 minutes of exercise every week. This is about 30 minutes each day, 5 or more days per week.  It can be difficult to have every meal look like MyPlate. Think about MyPlate as eating guidelines for an entire day, rather than each individual meal.  Fruits and vegetables  · Make half of your plate fruits and vegetables.  · Eat many different colors of fruits and vegetables each day.  · For a 2,000 calorie daily food plan, eat:  ? 2½ cups of vegetables every day.  ? 2 cups of fruit every day.  · 1 cup is equal to:  ? 1 cup raw or cooked vegetables.  ? 1 cup raw fruit.  ? 1 medium-sized orange, apple, or banana.  ? 1 cup 100% fruit or vegetable juice.  ? 2 cups raw leafy greens, such as lettuce, spinach, or kale.  ? ½ cup dried fruit.  Grains  · One fourth of your plate should be grains.  · Make at least half of the grains you eat each day whole grains.  · For a 2,000 calorie daily food plan, eat 6 oz of grains every day.  · 1 oz is equal to:  ? 1 slice bread.  ? 1 cup cereal.  ? ½ cup cooked rice, cereal, or pasta.  Protein  · One fourth of your plate should be protein.  · Eat a wide variety of protein foods, including meat, poultry, fish, eggs, beans, nuts, and tofu.  · For a 2,000 calorie daily food plan, eat 5½ oz of protein every day.  · 1 oz is equal to:  ? 1 oz meat, poultry, or fish.  ? ¼ cup cooked beans.  ? 1 egg.  ? ½ oz nuts  or seeds.  ? 1 Tbsp peanut butter.  Dairy  · Drink fat-free or low-fat (1%) milk.  · Eat or drink dairy as a side to meals.  · For a 2,000 calorie daily food plan, eat or drink 3 cups of dairy every day.  · 1 cup is equal to:  ? 1 cup milk, yogurt, cottage cheese, or soy milk (soy beverage).  ? 2 oz processed cheese.  ? 1½ oz natural cheese.  Fats, oils, salt, and sugars  · Only small amounts of oils are recommended.  · Avoid foods that are high in calories and low in nutritional value (empty calories), like foods high in fat or added sugars.  · Choose foods that are low in salt (sodium). Choose foods that have less than 140 milligrams (mg) of sodium per serving.  · Drink water instead of sugary drinks. Drink enough water each day to keep your urine pale yellow.  Where to find support  · Work with your health care provider or a nutrition specialist (dietitian) to develop a customized eating plan that is right for you.  · Download an alisha (mobile application) to help you track your daily food intake.  Where to find more information  · Go to ChooseMyPlate.gov for more information.  Summary  · MyPlate is a general guideline for healthy eating from the USDA. It is based on the 2010 Dietary Guidelines for Americans.  · In general, fruits and vegetables should take up ½ of your plate, grains should take up ¼ of your plate, and protein should take up ¼ of your plate.  This information is not intended to replace advice given to you by your health care provider. Make sure you discuss any questions you have with your health care provider.  Document Revised: 05/21/2020 Document Reviewed: 03/19/2018  Elsevier Patient Education © 2021 [employer]  BMI for Adults  What is BMI?  Body mass index (BMI) is a number that is calculated from a person's weight and height. BMI can help estimate how much of a person's weight is composed of fat. BMI does not measure body fat directly. Rather, it is an alternative to procedures that  "directly measure body fat, which can be difficult and expensive.  BMI can help identify people who may be at higher risk for certain medical problems.  What are BMI measurements used for?  BMI is used as a screening tool to identify possible weight problems. It helps determine whether a person is obese, overweight, a healthy weight, or underweight.  BMI is useful for:  · Identifying a weight problem that may be related to a medical condition or may increase the risk for medical problems.  · Promoting changes, such as changes in diet and exercise, to help reach a healthy weight. BMI screening can be repeated to see if these changes are working.  How is BMI calculated?  BMI involves measuring your weight in relation to your height. Both height and weight are measured, and the BMI is calculated from those numbers. This can be done either in English (U[place] or metric measurements. Note that charts and online BMI calculators are available to help you find your BMI quickly and easily without having to do these calculations yourself.  To calculate your BMI in English (U.S[place] measurements:    1. Measure your weight in pounds (lb).  2. Multiply the number of pounds by 703.  ? For example, for a person who weighs 180 lb, multiply that number by 703, which equals 126,540.  3. Measure your height in inches. Then multiply that number by itself to get a measurement called \"inches squared.\"  ? For example, for a person who is 70 inches tall, the \"inches squared\" measurement is 70 inches x 70 inches, which equals 4,900 inches squared.  4. Divide the total from step 2 (number of lb x 703) by the total from step 3 (inches squared): 126,540 ÷ 4,900 = 25.8. This is your BMI.    To calculate your BMI in metric measurements:  1. Measure your weight in kilograms (kg).  2. Measure your height in meters (m). Then multiply that number by itself to get a measurement called \"meters squared.\"  ? For example, for a person who is 1.75 m tall, the " "\"meters squared\" measurement is 1.75 m x 1.75 m, which is equal to 3.1 meters squared.  3. Divide the number of kilograms (your weight) by the meters squared number. In this example: 70 ÷ 3.1 = 22.6. This is your BMI.  What do the results mean?  BMI charts are used to identify whether you are underweight, normal weight, overweight, or obese. The following guidelines will be used:  · Underweight: BMI less than 18.5.  · Normal weight: BMI between 18.5 and 24.9.  · Overweight: BMI between 25 and 29.9.  · Obese: BMI of 30 or above.  Keep these notes in mind:  · Weight includes both fat and muscle, so someone with a muscular build, such as an athlete, may have a BMI that is higher than 24.9. In cases like these, BMI is not an accurate measure of body fat.  · To determine if excess body fat is the cause of a BMI of 25 or higher, further assessments may need to be done by a health care provider.  · BMI is usually interpreted in the same way for men and women.  Where to find more information  For more information about BMI, including tools to quickly calculate your BMI, go to these websites:  · Centers for Disease Control and Prevention: www.cdc.gov  · American Heart Association: www.heart.org  · National Heart, Lung, and Blood [place]: www.nhlbi.nih.gov  Summary  · Body mass index (BMI) is a number that is calculated from a person's weight and height.  · BMI may help estimate how much of a person's weight is composed of fat. BMI can help identify those who may be at higher risk for certain medical problems.  · BMI can be measured using English measurements or metric measurements.  · BMI charts are used to identify whether you are underweight, normal weight, overweight, or obese.  This information is not intended to replace advice given to you by your health care provider. Make sure you discuss any questions you have with your health care provider.  Document Revised: 09/09/2020 Document Reviewed: 07/17/2020  Lilian " Patient Education © 2021 [employer]vier Inc.

## 2021-11-17 ENCOUNTER — TELEPHONE (OUTPATIENT)
Dept: FAMILY MEDICINE CLINIC | Facility: CLINIC | Age: 61
End: 2021-11-17

## 2021-11-18 ENCOUNTER — OFFICE VISIT (OUTPATIENT)
Dept: FAMILY MEDICINE CLINIC | Facility: CLINIC | Age: 61
End: 2021-11-18

## 2021-11-18 VITALS
DIASTOLIC BLOOD PRESSURE: 88 MMHG | TEMPERATURE: 97.1 F | WEIGHT: 196.3 LBS | HEIGHT: 65 IN | OXYGEN SATURATION: 95 % | BODY MASS INDEX: 32.71 KG/M2 | SYSTOLIC BLOOD PRESSURE: 138 MMHG | HEART RATE: 108 BPM

## 2021-11-18 DIAGNOSIS — I73.9 RIGHT LEG CLAUDICATION (HCC): ICD-10-CM

## 2021-11-18 DIAGNOSIS — Z23 NEED FOR PNEUMOCOCCAL VACCINE: ICD-10-CM

## 2021-11-18 DIAGNOSIS — M79.644 PAIN OF RIGHT MIDDLE FINGER: ICD-10-CM

## 2021-11-18 DIAGNOSIS — I10 ESSENTIAL HYPERTENSION: ICD-10-CM

## 2021-11-18 DIAGNOSIS — E78.49 OTHER HYPERLIPIDEMIA: ICD-10-CM

## 2021-11-18 DIAGNOSIS — E11.65 TYPE 2 DIABETES MELLITUS WITH HYPERGLYCEMIA, WITHOUT LONG-TERM CURRENT USE OF INSULIN (HCC): ICD-10-CM

## 2021-11-18 LAB
EXPIRATION DATE: ABNORMAL
HBA1C MFR BLD: 7.3 %
Lab: ABNORMAL

## 2021-11-18 PROCEDURE — 90471 IMMUNIZATION ADMIN: CPT | Performed by: FAMILY MEDICINE

## 2021-11-18 PROCEDURE — 99214 OFFICE O/P EST MOD 30 MIN: CPT | Performed by: FAMILY MEDICINE

## 2021-11-18 PROCEDURE — 90732 PPSV23 VACC 2 YRS+ SUBQ/IM: CPT | Performed by: FAMILY MEDICINE

## 2021-11-18 PROCEDURE — 83036 HEMOGLOBIN GLYCOSYLATED A1C: CPT | Performed by: FAMILY MEDICINE

## 2021-11-18 RX ORDER — LISINOPRIL 20 MG/1
20 TABLET ORAL DAILY
Qty: 90 TABLET | Refills: 1 | Status: SHIPPED | OUTPATIENT
Start: 2021-11-18 | End: 2022-02-21 | Stop reason: SDUPTHER

## 2021-11-18 RX ORDER — LISINOPRIL AND HYDROCHLOROTHIAZIDE 20; 12.5 MG/1; MG/1
1 TABLET ORAL DAILY
Qty: 90 TABLET | Refills: 1 | Status: SHIPPED | OUTPATIENT
Start: 2021-11-18 | End: 2022-02-21 | Stop reason: SDUPTHER

## 2021-11-18 RX ORDER — GLIPIZIDE 10 MG/1
10 TABLET ORAL
Qty: 180 TABLET | Refills: 1 | Status: SHIPPED | OUTPATIENT
Start: 2021-11-18 | End: 2022-02-21 | Stop reason: SDUPTHER

## 2021-11-18 RX ORDER — ATORVASTATIN CALCIUM 20 MG/1
20 TABLET, FILM COATED ORAL
Qty: 90 TABLET | Refills: 1 | Status: SHIPPED | OUTPATIENT
Start: 2021-11-18 | End: 2022-02-21 | Stop reason: SDUPTHER
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
## 2021-11-18 NOTE — PROGRESS NOTES
Injection  Injection performed in right deltoid by Nickie Oconnor MA. Patient tolerated the procedure well without complications.  11/18/21   Nickie Oconnor MA

## 2021-11-20 PROBLEM — I73.9 RIGHT LEG CLAUDICATION: Status: ACTIVE | Noted: 2021-11-20

## 2021-12-14 ENCOUNTER — OFFICE VISIT (OUTPATIENT)
Dept: GASTROENTEROLOGY | Facility: CLINIC | Age: 61
End: 2021-12-14

## 2021-12-14 VITALS
BODY MASS INDEX: 31.65 KG/M2 | WEIGHT: 190 LBS | HEIGHT: 65 IN | DIASTOLIC BLOOD PRESSURE: 77 MMHG | HEART RATE: 82 BPM | SYSTOLIC BLOOD PRESSURE: 122 MMHG

## 2021-12-14 DIAGNOSIS — R19.7 DIARRHEA, UNSPECIFIED TYPE: ICD-10-CM

## 2021-12-14 DIAGNOSIS — K21.00 GASTROESOPHAGEAL REFLUX DISEASE WITH ESOPHAGITIS WITHOUT HEMORRHAGE: ICD-10-CM

## 2021-12-14 DIAGNOSIS — R13.10 DYSPHAGIA, UNSPECIFIED TYPE: Primary | ICD-10-CM

## 2021-12-14 DIAGNOSIS — R10.10 PAIN OF UPPER ABDOMEN: ICD-10-CM

## 2021-12-14 PROCEDURE — 99213 OFFICE O/P EST LOW 20 MIN: CPT | Performed by: PHYSICIAN ASSISTANT

## 2021-12-14 RX ORDER — DICYCLOMINE HYDROCHLORIDE 10 MG/1
10 CAPSULE ORAL
Qty: 90 CAPSULE | Refills: 2 | Status: SHIPPED | OUTPATIENT
Start: 2021-12-14 | End: 2022-02-18

## 2021-12-16 ENCOUNTER — OFFICE VISIT (OUTPATIENT)
Dept: FAMILY MEDICINE CLINIC | Facility: CLINIC | Age: 61
End: 2021-12-16

## 2021-12-16 VITALS
SYSTOLIC BLOOD PRESSURE: 160 MMHG | TEMPERATURE: 97.7 F | OXYGEN SATURATION: 98 % | RESPIRATION RATE: 20 BRPM | BODY MASS INDEX: 32.01 KG/M2 | DIASTOLIC BLOOD PRESSURE: 100 MMHG | HEART RATE: 85 BPM | HEIGHT: 65 IN | WEIGHT: 192.13 LBS

## 2021-12-16 DIAGNOSIS — M79.661 RIGHT CALF PAIN: ICD-10-CM

## 2021-12-16 DIAGNOSIS — I73.9 PVD (PERIPHERAL VASCULAR DISEASE) (HCC): Primary | ICD-10-CM

## 2021-12-16 DIAGNOSIS — M25.572 CHRONIC PAIN OF LEFT ANKLE: Primary | Chronic | ICD-10-CM

## 2021-12-16 DIAGNOSIS — J06.9 UPPER RESPIRATORY TRACT INFECTION, UNSPECIFIED TYPE: ICD-10-CM

## 2021-12-16 DIAGNOSIS — I10 ESSENTIAL HYPERTENSION: Chronic | ICD-10-CM

## 2021-12-16 DIAGNOSIS — G89.29 CHRONIC PAIN OF LEFT ANKLE: Primary | Chronic | ICD-10-CM

## 2021-12-16 DIAGNOSIS — M72.2 PLANTAR FASCIITIS OF LEFT FOOT: ICD-10-CM

## 2021-12-16 DIAGNOSIS — E11.65 TYPE 2 DIABETES MELLITUS WITH HYPERGLYCEMIA, WITHOUT LONG-TERM CURRENT USE OF INSULIN (HCC): Chronic | ICD-10-CM

## 2021-12-16 PROCEDURE — 99215 OFFICE O/P EST HI 40 MIN: CPT | Performed by: NURSE PRACTITIONER

## 2021-12-16 NOTE — PATIENT INSTRUCTIONS
Upper Respiratory Infection, Adult  An upper respiratory infection (URI) is a common viral infection of the nose, throat, and upper air passages that lead to the lungs. The most common type of URI is the common cold. URIs usually get better on their own, without medical treatment.  What are the causes?  A URI is caused by a virus. You may catch a virus by:  · Breathing in droplets from an infected person's cough or sneeze.  · Touching something that has been exposed to the virus (contaminated) and then touching your mouth, nose, or eyes.  What increases the risk?  You are more likely to get a URI if:  · You are very young or very old.  · It is asaf or winter.  · You have close contact with others, such as at a , school, or health care facility.  · You smoke.  · You have long-term (chronic) heart or lung disease.  · You have a weakened disease-fighting (immune) system.  · You have nasal allergies or asthma.  · You are experiencing a lot of stress.  · You work in an area that has poor air circulation.  · You have poor nutrition.  What are the signs or symptoms?  A URI usually involves some of the following symptoms:  · Runny or stuffy (congested) nose.  · Sneezing.  · Cough.  · Sore throat.  · Headache.  · Fatigue.  · Fever.  · Loss of appetite.  · Pain in your forehead, behind your eyes, and over your cheekbones (sinus pain).  · Muscle aches.  · Redness or irritation of the eyes.  · Pressure in the ears or face.  How is this diagnosed?  This condition may be diagnosed based on your medical history and symptoms, and a physical exam. Your health care provider may use a cotton swab to take a mucus sample from your nose (nasal swab). This sample can be tested to determine what virus is causing the illness.  How is this treated?  URIs usually get better on their own within 7-10 days. You can take steps at home to relieve your symptoms. Medicines cannot cure URIs, but your health care provider may recommend  certain medicines to help relieve symptoms, such as:  · Over-the-counter cold medicines.  · Cough suppressants. Coughing is a type of defense against infection that helps to clear the respiratory system, so take these medicines only as recommended by your health care provider.  · Fever-reducing medicines.  Follow these instructions at home:  Activity  · Rest as needed.  · If you have a fever, stay home from work or school until your fever is gone or until your health care provider says you are no longer contagious. Your health care provider may have you wear a face mask to prevent your infection from spreading.  Relieving symptoms  · Gargle with a salt-water mixture 3-4 times a day or as needed. To make a salt-water mixture, completely dissolve ½-1 tsp of salt in 1 cup of warm water.  · Use a cool-mist humidifier to add moisture to the air. This can help you breathe more easily.  Eating and drinking    · Drink enough fluid to keep your urine pale yellow.  · Eat soups and other clear broths.    General instructions    · Take over-the-counter and prescription medicines only as told by your health care provider. These include cold medicines, fever reducers, and cough suppressants.  · Do not use any products that contain nicotine or tobacco, such as cigarettes and e-cigarettes. If you need help quitting, ask your health care provider.  · Stay away from secondhand smoke.  · Stay up to date on all immunizations, including the yearly (annual) flu vaccine.  · Keep all follow-up visits as told by your health care provider. This is important.    How to prevent the spread of infection to others    · URIs can be passed from person to person (are contagious). To prevent the infection from spreading:  ? Wash your hands often with soap and water. If soap and water are not available, use hand .  ? Avoid touching your mouth, face, eyes, or nose.  ? Cough or sneeze into a tissue or your sleeve or elbow instead of into your  hand or into the air.    Contact a health care provider if:  · You are getting worse instead of better.  · You have a fever or chills.  · Your mucus is brown or red.  · You have yellow or brown discharge coming from your nose.  · You have pain in your face, especially when you bend forward.  · You have swollen neck glands.  · You have pain while swallowing.  · You have white areas in the back of your throat.  Get help right away if:  · You have shortness of breath that gets worse.  · You have severe or persistent:  ? Headache.  ? Ear pain.  ? Sinus pain.  ? Chest pain.  · You have chronic lung disease along with any of the following:  ? Wheezing.  ? Prolonged cough.  ? Coughing up blood.  ? A change in your usual mucus.  · You have a stiff neck.  · You have changes in your:  ? Vision.  ? Hearing.  ? Thinking.  ? Mood.  Summary  · An upper respiratory infection (URI) is a common infection of the nose, throat, and upper air passages that lead to the lungs.  · A URI is caused by a virus.  · URIs usually get better on their own within 7-10 days.  · Medicines cannot cure URIs, but your health care provider may recommend certain medicines to help relieve symptoms.  This information is not intended to replace advice given to you by your health care provider. Make sure you discuss any questions you have with your health care provider.  Document Revised: 12/26/2019 Document Reviewed: 08/03/2018  Spinal Modulation Patient Education © 2021 [employer]      Plantar Fasciitis Rehab  Ask your health care provider which exercises are safe for you. Do exercises exactly as told by your health care provider and adjust them as directed. It is normal to feel mild stretching, pulling, tightness, or discomfort as you do these exercises. Stop right away if you feel sudden pain or your pain gets worse. Do not begin these exercises until told by your health care provider.  Stretching and range-of-motion exercises  These exercises warm up your  muscles and joints and improve the movement and flexibility of your foot. These exercises also help to relieve pain.  Plantar fascia stretch    1. Sit with your left / right leg crossed over your opposite knee.  2. Hold your heel with one hand with that thumb near your arch. With your other hand, hold your toes and gently pull them back toward the top of your foot. You should feel a stretch on the bottom of your toes or your foot (plantar fascia) or both.  3. Hold this stretch for__________ seconds.  4. Slowly release your toes and return to the starting position.  Repeat __________ times. Complete this exercise __________ times a day.  Gastrocnemius stretch, standing  This exercise is also called a calf (gastroc) stretch. It stretches the muscles in the back of the upper calf.  1. Stand with your hands against a wall.  2. Extend your left / right leg behind you, and bend your front knee slightly.  3. Keeping your heels on the floor and your back knee straight, shift your weight toward the wall. Do not arch your back. You should feel a gentle stretch in your upper left / right calf.  4. Hold this position for __________ seconds.  Repeat __________ times. Complete this exercise __________ times a day.  Soleus stretch, standing  This exercise is also called a calf (soleus) stretch. It stretches the muscles in the back of the lower calf.  1. Stand with your hands against a wall.  2. Extend your left / right leg behind you, and bend your front knee slightly.  3. Keeping your heels on the floor, bend your back knee and shift your weight slightly over your back leg. You should feel a gentle stretch deep in your lower calf.  4. Hold this position for __________ seconds.  Repeat __________ times. Complete this exercise __________ times a day.  Gastroc and soleus stretch, standing step  This exercise stretches the muscles in the back of the lower leg. These muscles are in the upper calf (gastrocnemius) and the lower calf  (soleus).  1. Stand with the ball of your left / right foot on a step. The ball of your foot is on the walking surface, right under your toes.  2. Keep your other foot firmly on the same step.  3. Hold on to the wall or a railing for balance.  4. Slowly lift your other foot, allowing your body weight to press your left / right heel down over the edge of the step. You should feel a stretch in your left / right calf.  5. Hold this position for __________ seconds.  6. Return both feet to the step.  7. Repeat this exercise with a slight bend in your left / right knee.  Repeat __________ times with your left / right knee straight and __________ times with your left / right knee bent. Complete this exercise __________ times a day.  Balance exercise  This exercise builds your balance and strength control of your arch to help take pressure off your plantar fascia.  Single leg stand  If this exercise is too easy, you can try it with your eyes closed or while standing on a pillow.  1. Without shoes, stand near a railing or in a doorway. You may hold on to the railing or door frame as needed.  2. Stand on your left / right foot. Keep your big toe down on the floor and try to keep your arch lifted. Do not let your foot roll inward.  3. Hold this position for __________ seconds.  Repeat __________ times. Complete this exercise __________ times a day.  This information is not intended to replace advice given to you by your health care provider. Make sure you discuss any questions you have with your health care provider.  Document Revised: 04/09/2020 Document Reviewed: 10/16/2019  [employer] Patient Education © 2021 [employer]
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
## 2021-12-17 DIAGNOSIS — M25.572 CHRONIC PAIN OF LEFT ANKLE: Primary | ICD-10-CM

## 2021-12-17 DIAGNOSIS — M25.372 ANKLE INSTABILITY, LEFT: ICD-10-CM

## 2021-12-17 DIAGNOSIS — G89.29 CHRONIC PAIN OF LEFT ANKLE: Primary | ICD-10-CM

## 2021-12-17 NOTE — PROGRESS NOTES
Pt notified of results, she states her WHOLE left leg is now hurting and it is only relieved if she lays a heavy blanket over her leg-- I let her know you ordered the MRI of the ankle and once its approved we would call to get her schedule pt voiced understanding.

## 2021-12-21 DIAGNOSIS — I10 ESSENTIAL HYPERTENSION: ICD-10-CM

## 2021-12-21 RX ORDER — LISINOPRIL 20 MG/1
TABLET ORAL
Qty: 90 TABLET | Refills: 0 | OUTPATIENT
Start: 2021-12-21

## 2021-12-22 ENCOUNTER — TELEPHONE (OUTPATIENT)
Dept: FAMILY MEDICINE CLINIC | Facility: CLINIC | Age: 61
End: 2021-12-22

## 2021-12-22 RX ORDER — DEXTROMETHORPHAN HYDROBROMIDE AND PROMETHAZINE HYDROCHLORIDE 15; 6.25 MG/5ML; MG/5ML
5 SYRUP ORAL 4 TIMES DAILY PRN
Qty: 118 ML | Refills: 1 | Status: SHIPPED | OUTPATIENT
Start: 2021-12-22 | End: 2022-02-18

## 2021-12-22 RX ORDER — AZITHROMYCIN 250 MG/1
TABLET, FILM COATED ORAL
Qty: 6 TABLET | Refills: 0 | Status: SHIPPED | OUTPATIENT
Start: 2021-12-22 | End: 2022-02-18

## 2021-12-22 NOTE — TELEPHONE ENCOUNTER
Patient called she said she was seen at the ER she has a bad cough and can't get rid of it. She's not sleeping well at night (I have requested records)   She asked if something could get called in trung tate didn't prescribe anything for her. She said her covid test came back neg.

## 2022-01-27 ENCOUNTER — TELEPHONE (OUTPATIENT)
Dept: FAMILY MEDICINE CLINIC | Facility: CLINIC | Age: 62
End: 2022-01-27

## 2022-01-27 NOTE — TELEPHONE ENCOUNTER
MRI left ankle dated 1-:     Negative MRI findings    If continued pain/mobility problems with left ankle, recommend f/u with orthopedics or podiatry.  Does she want referral to D?

## 2022-01-27 NOTE — TELEPHONE ENCOUNTER
Gave pt results and recommendations. She voiced understanding and stated she would like to wait for now.

## 2022-01-31 DIAGNOSIS — M25.572 CHRONIC PAIN OF LEFT ANKLE: ICD-10-CM

## 2022-01-31 DIAGNOSIS — M25.372 ANKLE INSTABILITY, LEFT: ICD-10-CM

## 2022-01-31 DIAGNOSIS — G89.29 CHRONIC PAIN OF LEFT ANKLE: ICD-10-CM

## 2022-02-18 ENCOUNTER — OFFICE VISIT (OUTPATIENT)
Dept: GASTROENTEROLOGY | Facility: CLINIC | Age: 62
End: 2022-02-18

## 2022-02-18 VITALS
SYSTOLIC BLOOD PRESSURE: 153 MMHG | WEIGHT: 194 LBS | HEART RATE: 89 BPM | HEIGHT: 64 IN | DIASTOLIC BLOOD PRESSURE: 88 MMHG | BODY MASS INDEX: 33.12 KG/M2

## 2022-02-18 DIAGNOSIS — R13.10 DYSPHAGIA, UNSPECIFIED TYPE: Primary | ICD-10-CM

## 2022-02-18 DIAGNOSIS — R10.10 PAIN OF UPPER ABDOMEN: ICD-10-CM

## 2022-02-18 DIAGNOSIS — K21.00 GASTROESOPHAGEAL REFLUX DISEASE WITH ESOPHAGITIS WITHOUT HEMORRHAGE: ICD-10-CM

## 2022-02-18 PROCEDURE — 99213 OFFICE O/P EST LOW 20 MIN: CPT | Performed by: PHYSICIAN ASSISTANT

## 2022-02-18 RX ORDER — ESOMEPRAZOLE MAGNESIUM 40 MG/1
40 CAPSULE, DELAYED RELEASE ORAL DAILY
Qty: 30 CAPSULE | Refills: 3 | Status: SHIPPED | OUTPATIENT
Start: 2022-02-18 | End: 2022-06-22

## 2022-02-18 RX ORDER — SUCRALFATE 1 G/1
1 TABLET ORAL 2 TIMES DAILY
Qty: 60 TABLET | Refills: 1 | Status: SHIPPED | OUTPATIENT
Start: 2022-02-18 | End: 2022-05-05

## 2022-02-21 ENCOUNTER — OFFICE VISIT (OUTPATIENT)
Dept: FAMILY MEDICINE CLINIC | Facility: CLINIC | Age: 62
End: 2022-02-21

## 2022-02-21 VITALS
BODY MASS INDEX: 33.73 KG/M2 | DIASTOLIC BLOOD PRESSURE: 82 MMHG | TEMPERATURE: 96.8 F | HEART RATE: 84 BPM | SYSTOLIC BLOOD PRESSURE: 128 MMHG | WEIGHT: 197.6 LBS | OXYGEN SATURATION: 94 % | HEIGHT: 64 IN

## 2022-02-21 DIAGNOSIS — E11.65 TYPE 2 DIABETES MELLITUS WITH HYPERGLYCEMIA, WITHOUT LONG-TERM CURRENT USE OF INSULIN: ICD-10-CM

## 2022-02-21 DIAGNOSIS — E66.09 CLASS 1 OBESITY DUE TO EXCESS CALORIES WITH SERIOUS COMORBIDITY AND BODY MASS INDEX (BMI) OF 33.0 TO 33.9 IN ADULT: ICD-10-CM

## 2022-02-21 DIAGNOSIS — S69.92XA JAMMED INTERPHALANGEAL JOINT OF FINGER OF LEFT HAND, INITIAL ENCOUNTER: ICD-10-CM

## 2022-02-21 DIAGNOSIS — E78.49 OTHER HYPERLIPIDEMIA: ICD-10-CM

## 2022-02-21 DIAGNOSIS — M54.2 NECK PAIN: ICD-10-CM

## 2022-02-21 DIAGNOSIS — I10 ESSENTIAL HYPERTENSION: ICD-10-CM

## 2022-02-21 LAB
EXPIRATION DATE: ABNORMAL
HBA1C MFR BLD: 8 %
Lab: ABNORMAL

## 2022-02-21 PROCEDURE — 83036 HEMOGLOBIN GLYCOSYLATED A1C: CPT | Performed by: FAMILY MEDICINE

## 2022-02-21 PROCEDURE — 99214 OFFICE O/P EST MOD 30 MIN: CPT | Performed by: FAMILY MEDICINE

## 2022-02-21 RX ORDER — FLUCONAZOLE 100 MG/1
100 TABLET ORAL EVERY OTHER DAY
Qty: 2 TABLET | Refills: 2 | Status: SHIPPED | OUTPATIENT
Start: 2022-02-21 | End: 2022-05-05 | Stop reason: SDUPTHER
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
## 2022-02-22 ENCOUNTER — TELEPHONE (OUTPATIENT)
Dept: FAMILY MEDICINE CLINIC | Facility: CLINIC | Age: 62
End: 2022-02-22

## 2022-02-22 RX ORDER — LISINOPRIL AND HYDROCHLOROTHIAZIDE 20; 12.5 MG/1; MG/1
1 TABLET ORAL DAILY
Qty: 90 TABLET | Refills: 1 | Status: SHIPPED | OUTPATIENT
Start: 2022-02-22 | End: 2022-05-05 | Stop reason: SDUPTHER

## 2022-02-22 RX ORDER — ATORVASTATIN CALCIUM 20 MG/1
20 TABLET, FILM COATED ORAL
Qty: 90 TABLET | Refills: 1 | Status: SHIPPED | OUTPATIENT
Start: 2022-02-22 | End: 2022-05-05 | Stop reason: SDUPTHER

## 2022-02-22 RX ORDER — LISINOPRIL 20 MG/1
20 TABLET ORAL DAILY
Qty: 90 TABLET | Refills: 1 | Status: SHIPPED | OUTPATIENT
Start: 2022-02-22 | End: 2022-05-05 | Stop reason: SDUPTHER

## 2022-02-22 RX ORDER — GLIPIZIDE 10 MG/1
10 TABLET ORAL
Qty: 180 TABLET | Refills: 1 | Status: SHIPPED | OUTPATIENT
Start: 2022-02-22 | End: 2022-05-05 | Stop reason: SDUPTHER

## 2022-02-22 NOTE — TELEPHONE ENCOUNTER
----- Message from Micheal Morales MD sent at 2/22/2022  8:36 AM CST -----  X-ray of hands are normal. Will go over at next visit.

## 2022-02-23 PROBLEM — S69.92XA: Status: ACTIVE | Noted: 2022-02-23

## 2022-02-23 PROBLEM — E66.09 CLASS 1 OBESITY DUE TO EXCESS CALORIES WITH SERIOUS COMORBIDITY AND BODY MASS INDEX (BMI) OF 33.0 TO 33.9 IN ADULT: Status: ACTIVE | Noted: 2022-02-23

## 2022-04-05 ENCOUNTER — OFFICE VISIT (OUTPATIENT)
Dept: GASTROENTEROLOGY | Facility: CLINIC | Age: 62
End: 2022-04-05

## 2022-04-05 VITALS
BODY MASS INDEX: 32.78 KG/M2 | SYSTOLIC BLOOD PRESSURE: 113 MMHG | DIASTOLIC BLOOD PRESSURE: 62 MMHG | HEART RATE: 80 BPM | HEIGHT: 64 IN | WEIGHT: 192 LBS

## 2022-04-05 DIAGNOSIS — R10.10 PAIN OF UPPER ABDOMEN: ICD-10-CM

## 2022-04-05 DIAGNOSIS — K21.00 GASTROESOPHAGEAL REFLUX DISEASE WITH ESOPHAGITIS WITHOUT HEMORRHAGE: Primary | ICD-10-CM

## 2022-04-05 DIAGNOSIS — R13.10 DYSPHAGIA, UNSPECIFIED TYPE: ICD-10-CM

## 2022-04-05 PROCEDURE — 99213 OFFICE O/P EST LOW 20 MIN: CPT | Performed by: PHYSICIAN ASSISTANT

## 2022-04-05 NOTE — PROGRESS NOTES
Chief Complaint   Patient presents with   • Difficulty Swallowing   • Heartburn   • Abdominal Pain       ENDO PROCEDURE ORDERED:    Subjective    Ines Saravia is a 62 y.o. female. she is here today for follow-up.    History of Present Illness    Patient seen on a recheck of her GERD, abdominal pain, dysphagia. Last seen 02/18/2022. Patient was given a trial on Carafate. She is doing better on the Nexium and Carafate, has not been having the choking. Bowel movements are regular without blood or mucus. Weight is down 2 pounds since last visit. Last EGD/colonoscopy 09/28/2021 she had esophageal stricture dilated, positive H. Pylori, hyperplastic polyp removed from the colon.     Laboratory 02/21/2022 showed an A1c of 8.0, otherwise no recent studies.     A/P: Patient with chronic GERD with dysphagia, improved on current regimen. Encouraged to continue dietary modification and weight loss, avoid gastric irritants. Will plan follow-up in 3-6 months, sooner if needed.       The following portions of the patient's history were reviewed and updated as appropriate:   Past Medical History:   Diagnosis Date   • Abnormal mammogram of right breast     right breast density Spot compression negative 2/3/16      • Benign essential hypertension    • Continuous leakage of urine    • Dermatitis    • Diabetes mellitus (HCC)    • Diabetes mellitus screening    • Dysuria    • Encounter for gynecological examination with abnormal finding    • Generalized headache    • H/O screening mammography    • Impacted cerumen, bilateral    • Intermittent urinary incontinence    • Localized swelling, mass and lump, head     and neck   • Mitral valve prolapse    • Neck pain    • Neck swelling    • Neuropathy     Followed by Neurology      • Overweight    • Pain    • Patient's noncompliance with other medical treatment and regimen    • Radiculopathy, cervical region     Followed by Neurology      • Screening for hyperlipidemia    •  "Stiffness of joint     not elsewhere classified, involving hand      • Thyroid disorder screening    • Vaginal discharge      Past Surgical History:   Procedure Laterality Date   • COLONOSCOPY N/A 9/28/2021    Procedure: COLONOSCOPY;  Surgeon: Humphrey Campbell MD;  Location: [facility] ENDOSCOPY;  Service: Gastroenterology;  Laterality: N/A;   • ENDOSCOPY N/A 9/28/2021    Procedure: ESOPHAGOGASTRODUODENOSCOPY WITH DILATATION;  Surgeon: Humphrey Campbell MD;  Location: [facility] ENDOSCOPY;  Service: Gastroenterology;  Laterality: N/A;   • OTHER SURGICAL HISTORY  12/19/2015    Remove Impacted Cerumen    • TUBAL ABDOMINAL LIGATION       Family History   Problem Relation Age of Onset   • Diabetes Other    • Hypertension Other    • Heart disease Other    • Hyperlipidemia Other      OB History    No obstetric history on file.       Allergies   Allergen Reactions   • Penicillins Other (See Comments)     \"knots\" on skin     Social History     Socioeconomic History   • Marital status:    Tobacco Use   • Smoking status: Current Some Day Smoker     Types: Electronic Cigarette   • Smokeless tobacco: Never Used   • Tobacco comment: Current smokeless user; E-cig   Vaping Use   • Vaping Use: Some days   • Substances: Nicotine, Flavoring   • Devices: Refillable tank   Substance and Sexual Activity   • Alcohol use: No   • Drug use: No   • Sexual activity: Defer     Current Medications:  Prior to Admission medications    Medication Sig Start Date End Date Taking? Authorizing Provider   atorvastatin (LIPITOR) 20 MG tablet Take 1 tablet by mouth every night at bedtime. 2/22/22  Yes Micheal Morales MD   dapagliflozin (FARXIGA) 5 MG tablet tablet Take 1 tablet by mouth Daily. 2/21/22  Yes Micheal Morales MD   esomeprazole (nexIUM) 40 MG capsule Take 1 capsule by mouth Daily. 2/18/22  Yes Jens Mustafa PA-C   fluconazole (Diflucan) 100 MG tablet Take 1 tablet by mouth Every Other Day. Take if develops yeast infection with " "Farxiga. 2/21/22  Yes Micheal Morales MD   glipizide (Glucotrol) 10 MG tablet Take 1 tablet by mouth 2 (Two) Times a Day Before Meals. 2/22/22  Yes Micheal Moraels MD   lisinopril (PRINIVIL,ZESTRIL) 20 MG tablet Take 1 tablet by mouth Daily. 2/22/22  Yes Micheal Morales MD   lisinopril-hydrochlorothiazide (PRINZIDE,ZESTORETIC) 20-12.5 MG per tablet Take 1 tablet by mouth Daily. 2/22/22  Yes Micheal Morales MD   metFORMIN (GLUCOPHAGE) 1000 MG tablet Take 1 tablet by mouth 2 (Two) Times a Day. 2/22/22  Yes Micheal Morales MD   multivitamin with minerals tablet tablet Take 1 tablet by mouth Daily.   Yes ProviderPete MD   sucralfate (Carafate) 1 g tablet Take 1 tablet by mouth 2 (Two) Times a Day. Take one hour before or one hour after any other medication. 2/18/22  Yes Jens Mustafa PA-C   Diclofenac Sodium (VOLTAREN) 1 % gel gel Apply 4 g topically to the appropriate area as directed 4 (Four) Times a Day. 2/21/22 4/5/22  Micheal Morales MD     Review of Systems  Review of Systems       Objective    /62   Pulse 80   Ht 162.6 cm (64\")   Wt 87.1 kg (192 lb)   BMI 32.96 kg/m²   Physical Exam  Vitals and nursing note reviewed.   Constitutional:       General: She is not in acute distress.     Appearance: She is well-developed.      Comments: AA   HENT:      Head: Normocephalic and atraumatic.   Eyes:      Pupils: Pupils are equal, round, and reactive to light.   Cardiovascular:      Rate and Rhythm: Normal rate and regular rhythm.      Heart sounds: Normal heart sounds.   Pulmonary:      Effort: Pulmonary effort is normal.      Breath sounds: Normal breath sounds.   Abdominal:      General: Bowel sounds are normal. There is no distension or abdominal bruit.      Palpations: Abdomen is soft. Abdomen is not rigid. There is no shifting dullness or mass.      Tenderness: There is abdominal tenderness. There is no guarding or rebound.      Hernia: No hernia is present. " There is no hernia in the ventral area.   Musculoskeletal:         General: Normal range of motion.      Cervical back: Normal range of motion.   Skin:     General: Skin is warm and dry.   Neurological:      Mental Status: She is alert and oriented to person, place, and time.   Psychiatric:         Behavior: Behavior normal.         Thought Content: Thought content normal.         Judgment: Judgment normal.       Assessment/Plan      1. Gastroesophageal reflux disease with esophagitis without hemorrhage    2. Pain of upper abdomen    3. Dysphagia, unspecified type    .   Diagnoses and all orders for this visit:    1. Gastroesophageal reflux disease with esophagitis without hemorrhage (Primary)    2. Pain of upper abdomen    3. Dysphagia, unspecified type        Orders placed during this encounter include:  No orders of the defined types were placed in this encounter.      Medications prescribed:  No orders of the defined types were placed in this encounter.    Discontinued Medications       Reason for Discontinue     Diclofenac Sodium (VOLTAREN) 1 % gel gel    *Therapy completed        Requested Prescriptions      No prescriptions requested or ordered in this encounter       Review and/or summary of lab tests, radiology, procedures, medications. Review and summary of old records and obtaining of history. The risks and benefits of my recommendations, as well as other treatment options were discussed with the patient today. Questions were answered.    Follow-up: Return in about 3 months (around 7/5/2022), or if symptoms worsen or fail to improve.     * Surgery not found *      This document has been electronically signed by Jens Mustafa PA-C on April 15, 2022 17:18 CDT      Results for orders placed or performed in visit on 02/21/22   POC Glycated Hemoglobin, Total    Specimen: Blood   Result Value Ref Range    Hemoglobin A1C 8.0 %    Lot Number [id number]     Expiration Date 11,302,023    Results for orders placed  or performed in visit on 11/18/21   POC Glycated Hemoglobin, Total    Specimen: Blood   Result Value Ref Range    Hemoglobin A1C 7.3 %    Lot Number [id number]     Expiration Date 8,843,302    Results for orders placed or performed during the hospital encounter of 09/28/21   Tissue Pathology Exam    Specimen: A: Small Intestine, Duodenum; Tissue    B: Gastric, Antrum; Tissue    C: Esophagus, Distal; Tissue    D: Large Intestine, Sigmoid Colon; Tissue   Result Value Ref Range    Case Report       Surgical Pathology Report                         Case: [id number]                                  Authorizing Provider:  Humphrey Campbell MD        Collected:           09/28/2021 01:03 PM          Ordering Location:     [facility]   Received:            09/28/2021 01:53 PM                                 [place] [facility]                                                     Pathologist:           Jamie Torres MD                                                           Specimens:   1) - Small Intestine, Duodenum                                                                      2) - Gastric, Antrum                                                                                3) - Esophagus, Distal                                                                              4) - Large Intestine, Sigmoid Colon, polyp MM                                              Final Diagnosis       SEE SCANNED REPORT       POC Glucose Once    Specimen: Blood   Result Value Ref Range    Glucose 119 70 - 130 mg/dL   Results for orders placed or performed in visit on 09/27/21   COVID-19, [facility], NP SWAB IN TRANSPORT MEDIA 8-10 HR TAT - Swab, Nasopharynx    Specimen: Nasopharynx; Swab   Result Value Ref Range    COVID19 Not Detected Not Detected - Ref. Range   Results for orders placed or performed in visit on 09/14/21   COVID-19, [facility], NP SWAB IN TRANSPORT MEDIA 8-10 HR TAT - Swab,  Nasopharynx    Specimen: Nasopharynx; Swab   Result Value Ref Range    COVID19 Not Detected Not Detected - Ref. Range   Results for orders placed or performed in visit on 07/15/21   Urinalysis, Microscopic Only - Urine, Clean Catch    Specimen: Urine, Clean Catch   Result Value Ref Range    RBC, UA 0-2 None Seen, 0-2 /HPF    WBC, UA 0-2 None Seen, 0-2 /HPF    Bacteria, UA None Seen None Seen /HPF    Squamous Epithelial Cells, UA 0-2 None Seen, 0-2 /HPF    Hyaline Casts, UA None Seen None Seen /LPF    Methodology Automated Microscopy    Urinalysis With Culture If Indicated - Urine, Clean Catch    Specimen: Urine, Clean Catch   Result Value Ref Range    Color, UA Yellow Yellow, Straw    Appearance, UA Clear Clear    pH, UA 6.5 5.0 - 8.0    Specific Gravity, UA 1.015 1.005 - 1.030    Glucose, UA Negative Negative    Ketones, UA Negative Negative    Bilirubin, UA Negative Negative    Blood, UA Negative Negative    Protein, UA Negative Negative    Leuk Esterase, UA Small (1+) (A) Negative    Nitrite, UA Negative Negative    Urobilinogen, UA 1.0 E.U./dL 0.2 - 1.0 E.U./dL   CBC Auto Differential    Specimen: Blood   Result Value Ref Range    WBC 8.64 3.40 - 10.80 10*3/mm3    RBC 4.11 3.77 - 5.28 10*6/mm3    Hemoglobin 11.3 (L) 12.0 - 15.9 g/dL    Hematocrit 36.7 34.0 - 46.6 %    MCV 89.3 79.0 - 97.0 fL    MCH 27.5 26.6 - 33.0 pg    MCHC 30.8 (L) 31.5 - 35.7 g/dL    RDW 13.8 12.3 - 15.4 %    RDW-SD 44.7 37.0 - 54.0 fl    MPV 10.3 6.0 - 12.0 fL    Platelets 390 140 - 450 10*3/mm3    Neutrophil % 62.8 42.7 - 76.0 %    Lymphocyte % 30.1 19.6 - 45.3 %    Monocyte % 5.4 5.0 - 12.0 %    Eosinophil % 1.0 0.3 - 6.2 %    Basophil % 0.5 0.0 - 1.5 %    Immature Grans % 0.2 0.0 - 0.5 %    Neutrophils, Absolute 5.42 1.70 - 7.00 10*3/mm3    Lymphocytes, Absolute 2.60 0.70 - 3.10 10*3/mm3    Monocytes, Absolute 0.47 0.10 - 0.90 10*3/mm3    Eosinophils, Absolute 0.09 0.00 - 0.40 10*3/mm3    Basophils, Absolute 0.04 0.00 - 0.20 10*3/mm3     Immature Grans, Absolute 0.02 0.00 - 0.05 10*3/mm3    nRBC 0.0 0.0 - 0.2 /100 WBC   TSH    Specimen: Blood   Result Value Ref Range    TSH 0.351 0.270 - 4.200 uIU/mL   Lipid Panel    Specimen: Blood   Result Value Ref Range    Total Cholesterol 188 0 - 200 mg/dL    Triglycerides 89 0 - 150 mg/dL    HDL Cholesterol 65 (H) 40 - 60 mg/dL    LDL Cholesterol  107 (H) 0 - 100 mg/dL    VLDL Cholesterol 16 5 - 40 mg/dL    LDL/HDL Ratio 1.62    Comprehensive metabolic panel    Specimen: Blood   Result Value Ref Range    Glucose 74 65 - 99 mg/dL    BUN 11 8 - 23 mg/dL    Creatinine 0.81 0.57 - 1.00 mg/dL    Sodium 140 136 - 145 mmol/L    Potassium 4.1 3.5 - 5.2 mmol/L    Chloride 101 98 - 107 mmol/L    CO2 25.8 22.0 - 29.0 mmol/L    Calcium 9.6 8.6 - 10.5 mg/dL    Total Protein 7.0 6.0 - 8.5 g/dL    Albumin 4.20 3.50 - 5.20 g/dL    ALT (SGPT) 21 1 - 33 U/L    AST (SGOT) 18 1 - 32 U/L    Alkaline Phosphatase 80 39 - 117 U/L    Total Bilirubin 0.3 0.0 - 1.2 mg/dL    eGFR  African Amer 87 >60 mL/min/1.73    Globulin 2.8 gm/dL    A/G Ratio 1.5 g/dL    BUN/Creatinine Ratio 13.6 7.0 - 25.0    Anion Gap 13.2 5.0 - 15.0 mmol/L   Results for orders placed or performed in visit on 07/15/21   POC Glycated Hemoglobin, Total    Specimen: Blood   Result Value Ref Range    Hemoglobin A1C 6.4 %    Lot Number [id number]     Expiration Date 5,599,392    Results for orders placed or performed in visit on 07/09/21   POC Glucose    Specimen: Blood   Result Value Ref Range    Glucose 118 70 - 130 mg/dL   Results for orders placed or performed in visit on 08/21/18   Treadmill Stress Test   Result Value Ref Range     CV STRESS PROTOCOL 1 Karthik     Stage 1 1     HR Stage 1 101     BP Stage 1 151/100     Duration Min Stage 1 3     Duration Sec Stage 1 0     Grade Stage 1 10     Speed Stage 1 1.7     BH CV STRESS METS STAGE 1 5     Stage 2 2     HR Stage 2 120     BP Stage 2 208/108     Duration Min Stage 2 3     Duration Sec Stage 2 0     Grade Stage 2  12     Speed Stage 2 2.5     BH CV STRESS METS STAGE 2 7.5     Stage 3 3     HR Stage 3 157     BP Stage 3 212/112     Duration Min Stage 3 3     Duration Sec Stage 3 0     Grade Stage 3 14     Speed Stage 3 3.4     BH CV STRESS METS STAGE 3 10.0     Baseline HR 72 bpm    Baseline BP 1,541/100 mmHg    Peak  bpm    Percent Max Pred HR 99.38 %    Percent Target  %    Peak /112 mmHg    Recovery HR 84 bpm    Recovery /83 mmHg    Target HR (85%) 137 bpm    Max. Pred. HR (100%) 161 bpm    Exercise duration (min) 8 min    Exercise duration (sec) 59 sec    Estimated workload 10.1 METS     *Note: Due to a large number of results and/or encounters for the requested time period, some results have not been displayed. A complete set of results can be found in Results Review.

## 2022-04-14 DIAGNOSIS — E11.65 TYPE 2 DIABETES MELLITUS WITH HYPERGLYCEMIA, WITHOUT LONG-TERM CURRENT USE OF INSULIN: ICD-10-CM

## 2022-04-14 NOTE — TELEPHONE ENCOUNTER
Incoming Refill Request      Medication requested (name and dose):  metFORMIN (GLUCOPHAGE) 1000 MG tablet    Pharmacy where request should be sent: [facility]    Additional details provided by patient: NO    Best call back number:   [phone] (H)    Does the patient have less than a 3 day supply:  [x] Yes  [] No    Viridiana Reis  04/14/22, 08:10 CDT

## 2022-05-05 ENCOUNTER — OFFICE VISIT (OUTPATIENT)
Dept: FAMILY MEDICINE CLINIC | Facility: CLINIC | Age: 62
End: 2022-05-05

## 2022-05-05 VITALS
HEART RATE: 89 BPM | WEIGHT: 190.2 LBS | BODY MASS INDEX: 32.47 KG/M2 | HEIGHT: 64 IN | SYSTOLIC BLOOD PRESSURE: 120 MMHG | TEMPERATURE: 96.9 F | DIASTOLIC BLOOD PRESSURE: 70 MMHG | OXYGEN SATURATION: 96 %

## 2022-05-05 DIAGNOSIS — E78.49 OTHER HYPERLIPIDEMIA: ICD-10-CM

## 2022-05-05 DIAGNOSIS — E11.65 TYPE 2 DIABETES MELLITUS WITH HYPERGLYCEMIA, WITHOUT LONG-TERM CURRENT USE OF INSULIN: Primary | ICD-10-CM

## 2022-05-05 DIAGNOSIS — I10 ESSENTIAL HYPERTENSION: ICD-10-CM

## 2022-05-05 LAB
EXPIRATION DATE: ABNORMAL
HBA1C MFR BLD: 6 %
Lab: ABNORMAL

## 2022-05-05 PROCEDURE — 99214 OFFICE O/P EST MOD 30 MIN: CPT | Performed by: FAMILY MEDICINE

## 2022-05-05 RX ORDER — GLIPIZIDE 10 MG/1
10 TABLET ORAL
Qty: 180 TABLET | Refills: 1 | Status: SHIPPED | OUTPATIENT
Start: 2022-05-05 | End: 2022-09-29 | Stop reason: SDUPTHER

## 2022-05-05 RX ORDER — LISINOPRIL 20 MG/1
20 TABLET ORAL DAILY
Qty: 90 TABLET | Refills: 1 | Status: SHIPPED | OUTPATIENT
Start: 2022-05-05 | End: 2022-09-29 | Stop reason: SDUPTHER

## 2022-05-05 RX ORDER — FLUCONAZOLE 100 MG/1
100 TABLET ORAL EVERY OTHER DAY
Qty: 2 TABLET | Refills: 4 | Status: SHIPPED | OUTPATIENT
Start: 2022-05-05 | End: 2022-09-29

## 2022-05-05 RX ORDER — ATORVASTATIN CALCIUM 20 MG/1
20 TABLET, FILM COATED ORAL
Qty: 90 TABLET | Refills: 1 | Status: SHIPPED | OUTPATIENT
Start: 2022-05-05 | End: 2022-09-29 | Stop reason: SDUPTHER

## 2022-05-05 RX ORDER — LISINOPRIL AND HYDROCHLOROTHIAZIDE 20; 12.5 MG/1; MG/1
1 TABLET ORAL DAILY
Qty: 90 TABLET | Refills: 1 | Status: SHIPPED | OUTPATIENT
Start: 2022-05-05 | End: 2022-07-05 | Stop reason: SDUPTHER
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
## 2022-05-11 ENCOUNTER — OFFICE VISIT (OUTPATIENT)
Dept: FAMILY MEDICINE CLINIC | Facility: CLINIC | Age: 62
End: 2022-05-11

## 2022-05-11 VITALS
OXYGEN SATURATION: 96 % | DIASTOLIC BLOOD PRESSURE: 80 MMHG | HEART RATE: 81 BPM | BODY MASS INDEX: 32.35 KG/M2 | SYSTOLIC BLOOD PRESSURE: 118 MMHG | HEIGHT: 64 IN | WEIGHT: 189.5 LBS | TEMPERATURE: 97.1 F

## 2022-05-11 DIAGNOSIS — L80 VITILIGO: ICD-10-CM

## 2022-05-11 DIAGNOSIS — R51.9 SINUS HEADACHE: ICD-10-CM

## 2022-05-11 PROCEDURE — 99213 OFFICE O/P EST LOW 20 MIN: CPT | Performed by: FAMILY MEDICINE

## 2022-05-11 RX ORDER — LORATADINE AND PSEUDOEPHEDRINE SULFATE 5; 120 MG/1; MG/1
1 TABLET, EXTENDED RELEASE ORAL DAILY PRN
Qty: 30 TABLET | Refills: 1 | Status: SHIPPED | OUTPATIENT
Start: 2022-05-11 | End: 2022-08-18
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
## 2022-06-22 RX ORDER — ESOMEPRAZOLE MAGNESIUM 40 MG/1
CAPSULE, DELAYED RELEASE ORAL
Qty: 90 CAPSULE | Refills: 0 | Status: SHIPPED | OUTPATIENT
Start: 2022-06-22 | End: 2022-09-20

## 2022-07-05 DIAGNOSIS — I10 ESSENTIAL HYPERTENSION: ICD-10-CM

## 2022-07-05 RX ORDER — LISINOPRIL AND HYDROCHLOROTHIAZIDE 20; 12.5 MG/1; MG/1
1 TABLET ORAL DAILY
Qty: 90 TABLET | Refills: 1 | Status: SHIPPED | OUTPATIENT
Start: 2022-07-05 | End: 2022-09-29 | Stop reason: SDUPTHER

## 2022-07-27 ENCOUNTER — LAB (OUTPATIENT)
Dept: LAB | Facility: HOSPITAL | Age: 62
End: 2022-07-27

## 2022-07-27 DIAGNOSIS — E11.65 TYPE 2 DIABETES MELLITUS WITH HYPERGLYCEMIA, WITHOUT LONG-TERM CURRENT USE OF INSULIN: ICD-10-CM

## 2022-07-27 PROCEDURE — 80061 LIPID PANEL: CPT

## 2022-07-27 PROCEDURE — 82043 UR ALBUMIN QUANTITATIVE: CPT

## 2022-07-27 PROCEDURE — 80050 GENERAL HEALTH PANEL: CPT

## 2022-07-27 PROCEDURE — 81001 URINALYSIS AUTO W/SCOPE: CPT

## 2022-07-28 LAB
ALBUMIN SERPL-MCNC: 4.2 G/DL (ref 3.5–5.2)
ALBUMIN UR-MCNC: 4 MG/DL
ALBUMIN/GLOB SERPL: 1.7 G/DL
ALP SERPL-CCNC: 81 U/L (ref 39–117)
ALT SERPL W P-5'-P-CCNC: 22 U/L (ref 1–33)
ANION GAP SERPL CALCULATED.3IONS-SCNC: 15 MMOL/L (ref 5–15)
AST SERPL-CCNC: 20 U/L (ref 1–32)
BACTERIA UR QL AUTO: NORMAL /HPF
BASOPHILS # BLD AUTO: 0.04 10*3/MM3 (ref 0–0.2)
BASOPHILS NFR BLD AUTO: 0.5 % (ref 0–1.5)
BILIRUB SERPL-MCNC: 0.2 MG/DL (ref 0–1.2)
BILIRUB UR QL STRIP: NEGATIVE
BUN SERPL-MCNC: 16 MG/DL (ref 8–23)
BUN/CREAT SERPL: 17.4 (ref 7–25)
CALCIUM SPEC-SCNC: 9.4 MG/DL (ref 8.6–10.5)
CHLORIDE SERPL-SCNC: 103 MMOL/L (ref 98–107)
CHOLEST SERPL-MCNC: 170 MG/DL (ref 0–200)
CLARITY UR: ABNORMAL
CO2 SERPL-SCNC: 20 MMOL/L (ref 22–29)
COD CRY URNS QL: NORMAL /HPF
COLOR UR: ABNORMAL
CREAT SERPL-MCNC: 0.92 MG/DL (ref 0.57–1)
DEPRECATED RDW RBC AUTO: 40.9 FL (ref 37–54)
EGFRCR SERPLBLD CKD-EPI 2021: 70.1 ML/MIN/1.73
EOSINOPHIL # BLD AUTO: 0.09 10*3/MM3 (ref 0–0.4)
EOSINOPHIL NFR BLD AUTO: 1.2 % (ref 0.3–6.2)
ERYTHROCYTE [DISTWIDTH] IN BLOOD BY AUTOMATED COUNT: 13.2 % (ref 12.3–15.4)
GLOBULIN UR ELPH-MCNC: 2.5 GM/DL
GLUCOSE SERPL-MCNC: 131 MG/DL (ref 65–99)
GLUCOSE UR STRIP-MCNC: ABNORMAL MG/DL
HCT VFR BLD AUTO: 36.7 % (ref 34–46.6)
HDLC SERPL-MCNC: 61 MG/DL (ref 40–60)
HGB BLD-MCNC: 11.7 G/DL (ref 12–15.9)
HGB UR QL STRIP.AUTO: NEGATIVE
HYALINE CASTS UR QL AUTO: NORMAL /LPF
IMM GRANULOCYTES # BLD AUTO: 0.02 10*3/MM3 (ref 0–0.05)
IMM GRANULOCYTES NFR BLD AUTO: 0.3 % (ref 0–0.5)
KETONES UR QL STRIP: ABNORMAL
LDLC SERPL CALC-MCNC: 74 MG/DL (ref 0–100)
LDLC/HDLC SERPL: 1.09 {RATIO}
LEUKOCYTE ESTERASE UR QL STRIP.AUTO: NEGATIVE
LYMPHOCYTES # BLD AUTO: 2.38 10*3/MM3 (ref 0.7–3.1)
LYMPHOCYTES NFR BLD AUTO: 31.6 % (ref 19.6–45.3)
MCH RBC QN AUTO: 27.7 PG (ref 26.6–33)
MCHC RBC AUTO-ENTMCNC: 31.9 G/DL (ref 31.5–35.7)
MCV RBC AUTO: 87 FL (ref 79–97)
MONOCYTES # BLD AUTO: 0.67 10*3/MM3 (ref 0.1–0.9)
MONOCYTES NFR BLD AUTO: 8.9 % (ref 5–12)
NEUTROPHILS NFR BLD AUTO: 4.34 10*3/MM3 (ref 1.7–7)
NEUTROPHILS NFR BLD AUTO: 57.5 % (ref 42.7–76)
NITRITE UR QL STRIP: NEGATIVE
NRBC BLD AUTO-RTO: 0 /100 WBC (ref 0–0.2)
PH UR STRIP.AUTO: 6 [PH] (ref 5–8)
PLATELET # BLD AUTO: 461 10*3/MM3 (ref 140–450)
PMV BLD AUTO: 9.7 FL (ref 6–12)
POTASSIUM SERPL-SCNC: 4.2 MMOL/L (ref 3.5–5.2)
PROT SERPL-MCNC: 6.7 G/DL (ref 6–8.5)
PROT UR QL STRIP: ABNORMAL
RBC # BLD AUTO: 4.22 10*6/MM3 (ref 3.77–5.28)
RBC # UR STRIP: NORMAL /HPF
REF LAB TEST METHOD: NORMAL
SODIUM SERPL-SCNC: 138 MMOL/L (ref 136–145)
SP GR UR STRIP: >=1.03 (ref 1–1.03)
SQUAMOUS #/AREA URNS HPF: NORMAL /HPF
TRIGL SERPL-MCNC: 212 MG/DL (ref 0–150)
TSH SERPL DL<=0.05 MIU/L-ACNC: 0.59 UIU/ML (ref 0.27–4.2)
UROBILINOGEN UR QL STRIP: ABNORMAL
VLDLC SERPL-MCNC: 35 MG/DL (ref 5–40)
WBC # UR STRIP: NORMAL /HPF
WBC NRBC COR # BLD: 7.54 10*3/MM3 (ref 3.4–10.8)

## 2022-07-29 ENCOUNTER — TELEPHONE (OUTPATIENT)
Dept: FAMILY MEDICINE CLINIC | Facility: CLINIC | Age: 62
End: 2022-07-29

## 2022-07-29 NOTE — TELEPHONE ENCOUNTER
----- Message from Micheal Morales MD sent at 7/29/2022 10:39 AM CDT -----  Labs are OK, stable from past check. Will go over at next visit.   
Pt scheduled for appt 8/2/22, will go over results at that time.   
DISPLAY PLAN FREE TEXT

## 2022-08-02 ENCOUNTER — OFFICE VISIT (OUTPATIENT)
Dept: FAMILY MEDICINE CLINIC | Facility: CLINIC | Age: 62
End: 2022-08-02

## 2022-08-02 VITALS
DIASTOLIC BLOOD PRESSURE: 70 MMHG | HEIGHT: 64 IN | WEIGHT: 188.3 LBS | OXYGEN SATURATION: 96 % | BODY MASS INDEX: 32.15 KG/M2 | SYSTOLIC BLOOD PRESSURE: 118 MMHG | TEMPERATURE: 97.1 F | HEART RATE: 97 BPM

## 2022-08-02 DIAGNOSIS — K57.92 DIVERTICULITIS: ICD-10-CM

## 2022-08-02 DIAGNOSIS — I10 ESSENTIAL HYPERTENSION: ICD-10-CM

## 2022-08-02 DIAGNOSIS — R13.10 DYSPHAGIA, UNSPECIFIED TYPE: Primary | ICD-10-CM

## 2022-08-02 DIAGNOSIS — E11.65 TYPE 2 DIABETES MELLITUS WITH HYPERGLYCEMIA, WITHOUT LONG-TERM CURRENT USE OF INSULIN: Chronic | ICD-10-CM

## 2022-08-02 PROCEDURE — 99214 OFFICE O/P EST MOD 30 MIN: CPT | Performed by: FAMILY MEDICINE

## 2022-08-02 RX ORDER — METRONIDAZOLE 500 MG/1
500 TABLET ORAL 4 TIMES DAILY
COMMUNITY
Start: 2022-07-18 | End: 2022-08-02

## 2022-08-02 RX ORDER — SULFAMETHOXAZOLE AND TRIMETHOPRIM 800; 160 MG/1; MG/1
1 TABLET ORAL 2 TIMES DAILY
COMMUNITY
Start: 2022-07-18 | End: 2022-08-02
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
## 2022-08-18 ENCOUNTER — OFFICE VISIT (OUTPATIENT)
Dept: GASTROENTEROLOGY | Facility: CLINIC | Age: 62
End: 2022-08-18

## 2022-08-18 VITALS
BODY MASS INDEX: 32.54 KG/M2 | SYSTOLIC BLOOD PRESSURE: 129 MMHG | WEIGHT: 190.6 LBS | HEIGHT: 64 IN | HEART RATE: 84 BPM | DIASTOLIC BLOOD PRESSURE: 76 MMHG

## 2022-08-18 DIAGNOSIS — R10.32 LEFT LOWER QUADRANT ABDOMINAL PAIN: ICD-10-CM

## 2022-08-18 DIAGNOSIS — R10.10 PAIN OF UPPER ABDOMEN: ICD-10-CM

## 2022-08-18 DIAGNOSIS — K21.00 GASTROESOPHAGEAL REFLUX DISEASE WITH ESOPHAGITIS WITHOUT HEMORRHAGE: ICD-10-CM

## 2022-08-18 DIAGNOSIS — D50.8 OTHER IRON DEFICIENCY ANEMIA: ICD-10-CM

## 2022-08-18 DIAGNOSIS — R13.10 DYSPHAGIA, UNSPECIFIED TYPE: ICD-10-CM

## 2022-08-18 DIAGNOSIS — K57.92 DIVERTICULITIS: Primary | ICD-10-CM

## 2022-08-18 PROCEDURE — 99214 OFFICE O/P EST MOD 30 MIN: CPT | Performed by: PHYSICIAN ASSISTANT

## 2022-08-18 RX ORDER — DEXTROSE AND SODIUM CHLORIDE 5; .45 G/100ML; G/100ML
30 INJECTION, SOLUTION INTRAVENOUS CONTINUOUS PRN
Status: CANCELLED | OUTPATIENT
Start: 2022-10-04

## 2022-08-18 RX ORDER — KETOCONAZOLE 20 MG/G
1 CREAM TOPICAL DAILY
COMMUNITY
End: 2022-09-29

## 2022-09-06 ENCOUNTER — LAB (OUTPATIENT)
Dept: LAB | Facility: HOSPITAL | Age: 62
End: 2022-09-06

## 2022-09-06 LAB
BASOPHILS # BLD AUTO: 0.03 10*3/MM3 (ref 0–0.2)
BASOPHILS NFR BLD AUTO: 0.5 % (ref 0–1.5)
DEPRECATED RDW RBC AUTO: 39.9 FL (ref 37–54)
EOSINOPHIL # BLD AUTO: 0.08 10*3/MM3 (ref 0–0.4)
EOSINOPHIL NFR BLD AUTO: 1.4 % (ref 0.3–6.2)
ERYTHROCYTE [DISTWIDTH] IN BLOOD BY AUTOMATED COUNT: 12.9 % (ref 12.3–15.4)
FERRITIN SERPL-MCNC: 314 NG/ML (ref 13–150)
FOLATE SERPL-MCNC: 18 NG/ML (ref 4.78–24.2)
HCT VFR BLD AUTO: 35.2 % (ref 34–46.6)
HGB BLD-MCNC: 11.3 G/DL (ref 12–15.9)
IMM GRANULOCYTES # BLD AUTO: 0.01 10*3/MM3 (ref 0–0.05)
IMM GRANULOCYTES NFR BLD AUTO: 0.2 % (ref 0–0.5)
IRON 24H UR-MRATE: 62 MCG/DL (ref 37–145)
LYMPHOCYTES # BLD AUTO: 1.9 10*3/MM3 (ref 0.7–3.1)
LYMPHOCYTES NFR BLD AUTO: 34.1 % (ref 19.6–45.3)
MCH RBC QN AUTO: 27.9 PG (ref 26.6–33)
MCHC RBC AUTO-ENTMCNC: 32.1 G/DL (ref 31.5–35.7)
MCV RBC AUTO: 86.9 FL (ref 79–97)
MONOCYTES # BLD AUTO: 0.5 10*3/MM3 (ref 0.1–0.9)
MONOCYTES NFR BLD AUTO: 9 % (ref 5–12)
NEUTROPHILS NFR BLD AUTO: 3.05 10*3/MM3 (ref 1.7–7)
NEUTROPHILS NFR BLD AUTO: 54.8 % (ref 42.7–76)
NRBC BLD AUTO-RTO: 0 /100 WBC (ref 0–0.2)
PLATELET # BLD AUTO: 369 10*3/MM3 (ref 140–450)
PMV BLD AUTO: 10.3 FL (ref 6–12)
RBC # BLD AUTO: 4.05 10*6/MM3 (ref 3.77–5.28)
RETICS # AUTO: 0.06 10*6/MM3 (ref 0.02–0.13)
RETICS/RBC NFR AUTO: 1.54 % (ref 0.7–1.9)
VIT B12 BLD-MCNC: 818 PG/ML (ref 211–946)
WBC NRBC COR # BLD: 5.57 10*3/MM3 (ref 3.4–10.8)

## 2022-09-06 PROCEDURE — 82746 ASSAY OF FOLIC ACID SERUM: CPT | Performed by: PHYSICIAN ASSISTANT

## 2022-09-06 PROCEDURE — 85045 AUTOMATED RETICULOCYTE COUNT: CPT | Performed by: PHYSICIAN ASSISTANT

## 2022-09-06 PROCEDURE — 83540 ASSAY OF IRON: CPT | Performed by: PHYSICIAN ASSISTANT

## 2022-09-06 PROCEDURE — 85025 COMPLETE CBC W/AUTO DIFF WBC: CPT | Performed by: PHYSICIAN ASSISTANT

## 2022-09-06 PROCEDURE — 82728 ASSAY OF FERRITIN: CPT | Performed by: PHYSICIAN ASSISTANT

## 2022-09-06 PROCEDURE — 82607 VITAMIN B-12: CPT | Performed by: PHYSICIAN ASSISTANT

## 2022-09-06 RX ORDER — SODIUM, POTASSIUM,MAG SULFATES 17.5-3.13G
1 SOLUTION, RECONSTITUTED, ORAL ORAL EVERY 12 HOURS
Qty: 354 ML | Refills: 0 | Status: SHIPPED | OUTPATIENT
Start: 2022-09-06 | End: 2022-09-29

## 2022-09-20 RX ORDER — ESOMEPRAZOLE MAGNESIUM 40 MG/1
CAPSULE, DELAYED RELEASE ORAL
Qty: 90 CAPSULE | Refills: 0 | Status: SHIPPED | OUTPATIENT
Start: 2022-09-20 | End: 2022-09-29

## 2022-09-29 ENCOUNTER — OFFICE VISIT (OUTPATIENT)
Dept: FAMILY MEDICINE CLINIC | Facility: CLINIC | Age: 62
End: 2022-09-29

## 2022-09-29 VITALS
TEMPERATURE: 97.7 F | BODY MASS INDEX: 32.98 KG/M2 | OXYGEN SATURATION: 96 % | WEIGHT: 193.2 LBS | SYSTOLIC BLOOD PRESSURE: 118 MMHG | DIASTOLIC BLOOD PRESSURE: 80 MMHG | HEIGHT: 64 IN | HEART RATE: 87 BPM

## 2022-09-29 DIAGNOSIS — I10 ESSENTIAL HYPERTENSION: Chronic | ICD-10-CM

## 2022-09-29 DIAGNOSIS — E78.49 OTHER HYPERLIPIDEMIA: Chronic | ICD-10-CM

## 2022-09-29 DIAGNOSIS — E11.65 TYPE 2 DIABETES MELLITUS WITH HYPERGLYCEMIA, WITHOUT LONG-TERM CURRENT USE OF INSULIN: Chronic | ICD-10-CM

## 2022-09-29 DIAGNOSIS — L98.9 PRECANCEROUS SKIN LESION: ICD-10-CM

## 2022-09-29 PROCEDURE — 99214 OFFICE O/P EST MOD 30 MIN: CPT | Performed by: FAMILY MEDICINE

## 2022-09-29 RX ORDER — LISINOPRIL 20 MG/1
20 TABLET ORAL DAILY
COMMUNITY

## 2022-09-29 RX ORDER — LISINOPRIL AND HYDROCHLOROTHIAZIDE 20; 12.5 MG/1; MG/1
1 TABLET ORAL DAILY
Qty: 90 TABLET | Refills: 1 | Status: SHIPPED | OUTPATIENT
Start: 2022-09-29 | End: 2022-12-29 | Stop reason: SDUPTHER

## 2022-09-29 RX ORDER — LISINOPRIL 20 MG/1
20 TABLET ORAL DAILY
Qty: 90 TABLET | Refills: 1 | Status: SHIPPED | OUTPATIENT
Start: 2022-09-29 | End: 2022-09-29

## 2022-09-29 RX ORDER — ATORVASTATIN CALCIUM 20 MG/1
20 TABLET, FILM COATED ORAL
Qty: 90 TABLET | Refills: 1 | Status: SHIPPED | OUTPATIENT
Start: 2022-09-29 | End: 2022-12-29 | Stop reason: SDUPTHER

## 2022-09-29 RX ORDER — ESOMEPRAZOLE MAGNESIUM 40 MG/1
40 CAPSULE, DELAYED RELEASE ORAL
COMMUNITY
End: 2022-12-20

## 2022-09-29 RX ORDER — GLIPIZIDE 10 MG/1
10 TABLET ORAL
Qty: 180 TABLET | Refills: 1 | Status: SHIPPED | OUTPATIENT
Start: 2022-09-29 | End: 2022-12-29 | Stop reason: SDUPTHER
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
## 2022-09-30 RX ORDER — SODIUM, POTASSIUM,MAG SULFATES 17.5-3.13G
1 SOLUTION, RECONSTITUTED, ORAL ORAL EVERY 12 HOURS
Qty: 354 ML | Refills: 0 | Status: SHIPPED | OUTPATIENT
Start: 2022-09-30 | End: 2022-10-04 | Stop reason: HOSPADM

## 2022-10-04 ENCOUNTER — ANESTHESIA EVENT (OUTPATIENT)
Dept: GASTROENTEROLOGY | Facility: HOSPITAL | Age: 62
End: 2022-10-04

## 2022-10-04 ENCOUNTER — ANESTHESIA (OUTPATIENT)
Dept: GASTROENTEROLOGY | Facility: HOSPITAL | Age: 62
End: 2022-10-04

## 2022-10-04 ENCOUNTER — HOSPITAL ENCOUNTER (OUTPATIENT)
Facility: HOSPITAL | Age: 62
Setting detail: HOSPITAL OUTPATIENT SURGERY
Discharge: HOME | End: 2022-10-04
Attending: INTERNAL MEDICINE | Admitting: PHYSICIAN ASSISTANT

## 2022-10-04 VITALS
HEART RATE: 102 BPM | WEIGHT: 191 LBS | OXYGEN SATURATION: 97 % | DIASTOLIC BLOOD PRESSURE: 53 MMHG | SYSTOLIC BLOOD PRESSURE: 102 MMHG | HEIGHT: 64 IN | BODY MASS INDEX: 32.61 KG/M2 | TEMPERATURE: 97 F | RESPIRATION RATE: 20 BRPM

## 2022-10-04 DIAGNOSIS — D50.8 OTHER IRON DEFICIENCY ANEMIA: ICD-10-CM

## 2022-10-04 DIAGNOSIS — R10.32 LEFT LOWER QUADRANT ABDOMINAL PAIN: ICD-10-CM

## 2022-10-04 DIAGNOSIS — R13.10 DYSPHAGIA, UNSPECIFIED TYPE: ICD-10-CM

## 2022-10-04 DIAGNOSIS — K57.92 DIVERTICULITIS: ICD-10-CM

## 2022-10-04 DIAGNOSIS — R10.10 PAIN OF UPPER ABDOMEN: ICD-10-CM

## 2022-10-04 LAB — GLUCOSE BLDC GLUCOMTR-MCNC: 136 MG/DL (ref 70–130)

## 2022-10-04 PROCEDURE — 25010000002 GLUCAGON (HUMAN RECOMBINANT) 1 MG RECONSTITUTED SOLUTION

## 2022-10-04 PROCEDURE — 43248 EGD GUIDE WIRE INSERTION: CPT | Performed by: INTERNAL MEDICINE

## 2022-10-04 PROCEDURE — 45380 COLONOSCOPY AND BIOPSY: CPT | Performed by: INTERNAL MEDICINE

## 2022-10-04 PROCEDURE — 82962 GLUCOSE BLOOD TEST: CPT

## 2022-10-04 PROCEDURE — C1769 GUIDE WIRE: HCPCS | Performed by: INTERNAL MEDICINE

## 2022-10-04 PROCEDURE — 25010000002 PROPOFOL 10 MG/ML EMULSION

## 2022-10-04 PROCEDURE — 43239 EGD BIOPSY SINGLE/MULTIPLE: CPT | Performed by: INTERNAL MEDICINE

## 2022-10-04 PROCEDURE — 88305 TISSUE EXAM BY PATHOLOGIST: CPT

## 2022-10-04 RX ORDER — DEXTROSE AND SODIUM CHLORIDE 5; .45 G/100ML; G/100ML
30 INJECTION, SOLUTION INTRAVENOUS CONTINUOUS PRN
Status: DISCONTINUED | OUTPATIENT
Start: 2022-10-04 | End: 2022-10-04 | Stop reason: HOSPADM

## 2022-10-04 RX ORDER — LIDOCAINE HYDROCHLORIDE 20 MG/ML
INJECTION, SOLUTION INTRAVENOUS AS NEEDED
Status: DISCONTINUED | OUTPATIENT
Start: 2022-10-04 | End: 2022-10-04 | Stop reason: SURG

## 2022-10-04 RX ORDER — PROPOFOL 10 MG/ML
VIAL (ML) INTRAVENOUS AS NEEDED
Status: DISCONTINUED | OUTPATIENT
Start: 2022-10-04 | End: 2022-10-04 | Stop reason: SURG

## 2022-10-04 RX ADMIN — PROPOFOL 20 MG: 10 INJECTION, EMULSION INTRAVENOUS at 09:42

## 2022-10-04 RX ADMIN — GLUCAGON HYDROCHLORIDE 0.5 MG: KIT at 09:48

## 2022-10-04 RX ADMIN — PROPOFOL 100 MG: 10 INJECTION, EMULSION INTRAVENOUS at 09:37

## 2022-10-04 RX ADMIN — PROPOFOL 20 MG: 10 INJECTION, EMULSION INTRAVENOUS at 09:46

## 2022-10-04 RX ADMIN — PROPOFOL 20 MG: 10 INJECTION, EMULSION INTRAVENOUS at 09:41

## 2022-10-04 RX ADMIN — PROPOFOL 40 MG: 10 INJECTION, EMULSION INTRAVENOUS at 09:53

## 2022-10-04 RX ADMIN — PROPOFOL 20 MG: 10 INJECTION, EMULSION INTRAVENOUS at 09:39

## 2022-10-04 RX ADMIN — LIDOCAINE HYDROCHLORIDE 100 MG: 20 INJECTION, SOLUTION INTRAVENOUS at 09:37

## 2022-10-04 RX ADMIN — PROPOFOL 20 MG: 10 INJECTION, EMULSION INTRAVENOUS at 09:49

## 2022-10-04 RX ADMIN — PROPOFOL 20 MG: 10 INJECTION, EMULSION INTRAVENOUS at 09:50

## 2022-10-04 RX ADMIN — DEXTROSE AND SODIUM CHLORIDE 30 ML/HR: 5; 450 INJECTION, SOLUTION INTRAVENOUS at 08:50

## 2022-10-04 RX ADMIN — PROPOFOL 20 MG: 10 INJECTION, EMULSION INTRAVENOUS at 09:44

## 2022-10-04 NOTE — ANESTHESIA POSTPROCEDURE EVALUATION
Patient: Ines Saravia    Procedure Summary     Date: 10/04/22 Room / Location: [facility] ENDOSCOPY 1 / [facility]    Anesthesia Start: 0935 Anesthesia Stop: 0957    Procedures:       ESOPHAGOGASTRODUODENOSCOPY WITH DILATATION (N/A )      COLONOSCOPY (N/A ) Diagnosis:       Diverticulitis      Other iron deficiency anemia      Dysphagia, unspecified type      Pain of upper abdomen      Left lower quadrant abdominal pain      (Diverticulitis [K57.92])      (Other iron deficiency anemia [D50.8])      (Dysphagia, unspecified type [R13.10])      (Pain of upper abdomen [R10.10])      (Left lower quadrant abdominal pain [R10.32])    Surgeons: Humphrey Campbell MD Provider: Yennifer Miner CRNA    Anesthesia Type: general ASA Status: 3          Anesthesia Type: general    Vitals  No vitals data found for the desired time range.          Post Anesthesia Care and Evaluation    Patient location during evaluation: bedside  Patient participation: waiting for patient participation  Level of consciousness: responsive to verbal stimuli  Pain management: adequate    Airway patency: patent  Anesthetic complications: No anesthetic complications  PONV Status: none  Cardiovascular status: acceptable  Respiratory status: acceptable  Hydration status: acceptable    Comments: -------------------------              10/04/22                    0841        -------------------------   BP:         111/57        Resp:         20          Temp:   97 °F (36.1 °C)   SpO2:         97%        -------------------------

## 2022-10-04 NOTE — H&P
No chief complaint on file.      ENDO PROCEDURE ORDERED: EGDw/dilation dysphagia, gerd, COLON diverticulitis    Subjective    Ines Saravia is a 62 y.o. female. she is here today for follow-up.    I agree with the current note with no changes in the history.    History of Present Illness    The patient is seen on a recheck of her GERD, abdominal pain, and diverticular disease.  Last seen 04/05/2022.  Patient states she had a sudden onset of left lower quadrant pain. Foods are getting stuck, it does not matter what she eats, despite taking Nexium 40 mg daily.  She states it is very painful when she tries to eat.  It will finally pass.  It does not occur with every meal.  Her left lower quadrant pain is better if she drinks fluids, it seems to ease the pain.  She states her bowels have been regular without blood or mucus. Weight is down 1-1/2 pounds since last visit.  Last EGD/colonoscopy 09/28/2021.  She had an esophageal stricture, dilated, positive H. pylori and a hyperplastic polyp removed from the colon.      Patient was seen as an ER followup by Dr. Morales on 08/02/202 for diverticulitis and dysphagia.  She was treated at [facility] 07/17/2022.  CT of the abdomen and pelvis with contrast showed mild pericolonic inflammatory changes of the descending and sigmoid colons consistent with diverticulitis and mild atherosclerotic calcifications.  CBC showed a hemoglobin of 10.9, hematocrit 34.6.  Normal CMP.  Urinalysis showed glucose and bacteria.  She was treated with Bactrim and is doing a bit better.  She had laboratories on 07/27/2022.  CMP showed a glucose of 131 otherwise normal.  CBC showed a hemoglobin of 11.7, platelets 461,000.  Cholesterol panel showed triglycerides 212.  She did see [facility], for tinea versicolor and a skin cancer on 08/08/2022.     ASSESSMENT AND PLAN:  Patient with increasing GERD symptoms and dysphagia, suspect recurrent peptic stricture.  She does have  a personal history of colon polyps and has had a significant drop in her hemoglobin and recent diverticulitis.  Recommend repeat colonoscopy given significant change in her symptoms and her anemia.  The anemia has appeared to be chronic in the past, but it has acutely dropped.  We will plan followup after the above.  Further pending clinical course and the results of the above.       The following portions of the patient's history were reviewed and updated as appropriate:   Past Medical History:   Diagnosis Date   • Abnormal mammogram of right breast     right breast density Spot compression negative 2/3/16      • Benign essential hypertension    • Cancer (HCC)    • Continuous leakage of urine    • Dermatitis    • Diabetes mellitus (HCC)    • Diabetes mellitus screening    • Dysuria    • Elevated cholesterol    • Encounter for gynecological examination with abnormal finding    • Generalized headache    • H/O screening mammography    • Impacted cerumen, bilateral    • Intermittent urinary incontinence    • Localized swelling, mass and lump, head     and neck   • Mitral valve prolapse    • Neck pain    • Neck swelling    • Neuropathy     Followed by Neurology      • Overweight    • Pain    • Patient's noncompliance with other medical treatment and regimen    • Radiculopathy, cervical region     Followed by Neurology      • Screening for hyperlipidemia    • Stiffness of joint     not elsewhere classified, involving hand      • Thyroid disorder screening    • Vaginal discharge      Past Surgical History:   Procedure Laterality Date   • COLONOSCOPY N/A 09/28/2021    Procedure: COLONOSCOPY;  Surgeon: Humphrey Campbell MD;  Location: [facility] ENDOSCOPY;  Service: Gastroenterology;  Laterality: N/A;   • ENDOSCOPY N/A 09/28/2021    Procedure: ESOPHAGOGASTRODUODENOSCOPY WITH DILATATION;  Surgeon: Humphrey Campbell MD;  Location: [facility] ENDOSCOPY;  Service: Gastroenterology;  Laterality: N/A;   • OTHER SURGICAL HISTORY  12/19/2015  "   Remove Impacted Cerumen    • TUBAL ABDOMINAL LIGATION       Family History   Problem Relation Age of Onset   • Diabetes Other    • Hypertension Other    • Heart disease Other    • Hyperlipidemia Other      OB History    No obstetric history on file.       Allergies   Allergen Reactions   • Penicillins Other (See Comments)     \"knots\" on skin     Social History     Socioeconomic History   • Marital status:    Tobacco Use   • Smoking status: Current Some Day Smoker     Types: Electronic Cigarette   • Smokeless tobacco: Never Used   Vaping Use   • Vaping Use: Some days   • Substances: Nicotine, Flavoring   • Devices: [employer] tank   Substance and Sexual Activity   • Alcohol use: No   • Drug use: No   • Sexual activity: Defer     Current Medications:  Prior to Admission medications    Medication Sig Start Date End Date Taking? Authorizing Provider   atorvastatin (LIPITOR) 20 MG tablet Take 1 tablet by mouth every night at bedtime. 5/5/22  Yes Micheal Morales MD   dapagliflozin (FARXIGA) 5 MG tablet tablet Take 1 tablet by mouth Daily. 5/5/22  Yes Micheal Morales MD   esomeprazole (nexIUM) 40 MG capsule Take 1 capsule by mouth once daily 6/22/22  Yes Jens Mustafa PA-C   fluconazole (Diflucan) 100 MG tablet Take 1 tablet by mouth Every Other Day. Take if develops yeast infection with Farxiga. 5/5/22  Yes Micheal Morales MD   glipizide (Glucotrol) 10 MG tablet Take 1 tablet by mouth 2 (Two) Times a Day Before Meals. 5/5/22  Yes Micheal Morales MD   ketoconazole (NIZORAL) 2 % cream Apply 1 application topically to the appropriate area as directed Daily.   Yes Provider, MD Pete   lisinopril (PRINIVIL,ZESTRIL) 20 MG tablet Take 1 tablet by mouth Daily. 5/5/22  Yes Micheal Morales MD   lisinopril-hydrochlorothiazide (PRINZIDE,ZESTORETIC) 20-12.5 MG per tablet Take 1 tablet by mouth Daily. 7/5/22  Yes Micheal Morales MD   metFORMIN (GLUCOPHAGE) 1000 MG tablet Take 1 " "tablet by mouth 2 (Two) Times a Day. 5/5/22  Yes Micheal Morales MD   multivitamin with minerals tablet tablet Take 1 tablet by mouth Daily.   Yes Provider, MD Pete   loratadine-pseudoephedrine (Claritin-D 12 Hour) 5-120 MG per 12 hr tablet Take 1 tablet by mouth Daily As Needed for Allergies (Sinus Headache). 5/11/22 8/18/22  Micheal Morales MD     Review of Systems  Review of Systems   Constitutional: Negative for unexpected weight change.   HENT: Positive for trouble swallowing.    Gastrointestinal: Positive for abdominal distention, abdominal pain and nausea. Negative for anal bleeding, blood in stool, constipation, diarrhea, rectal pain and vomiting.          Objective    /57   Temp 97 °F (36.1 °C)   Resp 20   Ht 162.6 cm (64.02\")   Wt 86.6 kg (191 lb)   SpO2 97%   BMI 32.77 kg/m²   Physical Exam  Vitals and nursing note reviewed.   Constitutional:       General: She is not in acute distress.     Appearance: She is well-developed. She is ill-appearing.      Comments: AA   HENT:      Head: Normocephalic and atraumatic.   Eyes:      Pupils: Pupils are equal, round, and reactive to light.   Cardiovascular:      Rate and Rhythm: Normal rate and regular rhythm.      Heart sounds: Normal heart sounds.   Pulmonary:      Effort: Pulmonary effort is normal.      Breath sounds: Normal breath sounds.   Abdominal:      General: Bowel sounds are normal. There is no distension or abdominal bruit.      Palpations: Abdomen is soft. Abdomen is not rigid. There is no shifting dullness or mass.      Tenderness: There is abdominal tenderness. There is no guarding or rebound.      Hernia: No hernia is present. There is no hernia in the ventral area.   Musculoskeletal:         General: Normal range of motion.      Cervical back: Normal range of motion.   Skin:     General: Skin is warm and dry.   Neurological:      Mental Status: She is alert and oriented to person, place, and time.   Psychiatric:    "      Behavior: Behavior normal.         Thought Content: Thought content normal.         Judgment: Judgment normal.       Assessment & Plan      1. Diverticulitis    2. Pain of upper abdomen    3. Other iron deficiency anemia    4. Dysphagia, unspecified type    5. Left lower quadrant abdominal pain    .   Diagnoses and all orders for this visit:    1. Diverticulitis  -     dextrose 5 % and sodium chloride 0.45 % infusion    2. Pain of upper abdomen  -     dextrose 5 % and sodium chloride 0.45 % infusion    3. Other iron deficiency anemia  -     dextrose 5 % and sodium chloride 0.45 % infusion    4. Dysphagia, unspecified type  -     dextrose 5 % and sodium chloride 0.45 % infusion    5. Left lower quadrant abdominal pain  -     dextrose 5 % and sodium chloride 0.45 % infusion    Other orders  -     Insert Peripheral IV; Standing  -     Obtain Informed Consent; Standing  -     POC Glucose Once; Standing  -     Obtain Informed Consent  -     POC Glucose Once  -     Insert Peripheral IV  -     POC Glucose Once; Standing  -     POC Glucose Once        Orders placed during this encounter include:  Orders Placed This Encounter   Procedures   • Obtain Informed Consent     Standing Status:   Standing     Number of Occurrences:   1     Order Specific Question:   Informed Consent Given For     Answer:   ESOPHAGOGASTRODUODENOSCOPY and colonoscopy   • POC Glucose Once     Prior to Procedure on ALL Diabetic Patients     Standing Status:   Standing     Number of Occurrences:   1     Order Specific Question:   Release to patient     Answer:   Routine Release   • POC Glucose Once     Standing Status:   Standing     Number of Occurrences:   1     Order Specific Question:   Release to patient     Answer:   Routine Release   • Insert Peripheral IV     Standing Status:   Standing     Number of Occurrences:   1       Medications prescribed:  New Medications Ordered This Visit   Medications   • dextrose 5 % and sodium chloride 0.45 %  infusion     Discontinued Medications       Reason for Discontinue     loratadine-pseudoephedrine (Claritin-D 12 Hour) 5-120 MG per 12 hr tablet    *Therapy completed        Requested Prescriptions      No prescriptions requested or ordered in this encounter       Review and/or summary of lab tests, radiology, procedures, medications. Review and summary of old records and obtaining of history. The risks and benefits of my recommendations, as well as other treatment options were discussed with the patient today. Questions were answered.    Follow-up: No follow-ups on file.     ESOPHAGOGASTRODUODENOSCOPY WITH DILATATION (N/A), COLONOSCOPY (N/A)      This document has been electronically signed by Humphrey Campbell MD on October 4, 2022 09:33 CDT      Results for orders placed or performed during the hospital encounter of 10/04/22   POC Glucose Once    Specimen: Blood   Result Value Ref Range    Glucose 136 (H) 70 - 130 mg/dL   Results for orders placed or performed in visit on 08/18/22   Vitamin B12 and Folate    Specimen: Blood   Result Value Ref Range    Folate 18.00 4.78 - 24.20 ng/mL    Vitamin B-12 818 211 - 946 pg/mL   CBC Auto Differential    Specimen: Blood   Result Value Ref Range    WBC 5.57 3.40 - 10.80 10*3/mm3    RBC 4.05 3.77 - 5.28 10*6/mm3    Hemoglobin 11.3 (L) 12.0 - 15.9 g/dL    Hematocrit 35.2 34.0 - 46.6 %    MCV 86.9 79.0 - 97.0 fL    MCH 27.9 26.6 - 33.0 pg    MCHC 32.1 31.5 - 35.7 g/dL    RDW 12.9 12.3 - 15.4 %    RDW-SD 39.9 37.0 - 54.0 fl    MPV 10.3 6.0 - 12.0 fL    Platelets 369 140 - 450 10*3/mm3    Neutrophil % 54.8 42.7 - 76.0 %    Lymphocyte % 34.1 19.6 - 45.3 %    Monocyte % 9.0 5.0 - 12.0 %    Eosinophil % 1.4 0.3 - 6.2 %    Basophil % 0.5 0.0 - 1.5 %    Immature Grans % 0.2 0.0 - 0.5 %    Neutrophils, Absolute 3.05 1.70 - 7.00 10*3/mm3    Lymphocytes, Absolute 1.90 0.70 - 3.10 10*3/mm3    Monocytes, Absolute 0.50 0.10 - 0.90 10*3/mm3    Eosinophils, Absolute 0.08 0.00 - 0.40 10*3/mm3     Basophils, Absolute 0.03 0.00 - 0.20 10*3/mm3    Immature Grans, Absolute 0.01 0.00 - 0.05 10*3/mm3    nRBC 0.0 0.0 - 0.2 /100 WBC   Reticulocytes    Specimen: Blood   Result Value Ref Range    Reticulocyte % 1.54 0.70 - 1.90 %    Reticulocyte Absolute 0.0624 0.0200 - 0.1300 10*6/mm3   Iron    Specimen: Blood   Result Value Ref Range    Iron 62 37 - 145 mcg/dL   Ferritin    Specimen: Blood   Result Value Ref Range    Ferritin 314.00 (H) 13.00 - 150.00 ng/mL   Results for orders placed or performed in visit on 07/27/22   Urinalysis, Microscopic Only - Urine, Clean Catch    Specimen: Urine, Clean Catch   Result Value Ref Range    RBC, UA 0-2 None Seen, 0-2 /HPF    WBC, UA 0-2 None Seen, 0-2 /HPF    Bacteria, UA None Seen None Seen /HPF    Squamous Epithelial Cells, UA 0-2 None Seen, 0-2 /HPF    Hyaline Casts, UA 3-6 None Seen /LPF    Calcium Oxalate Crystals, UA Moderate/2+ None Seen /HPF    Methodology Manual Light Microscopy    Urinalysis With Culture If Indicated - Urine, Clean Catch    Specimen: Urine, Clean Catch   Result Value Ref Range    Color, UA Dark Yellow (A) Yellow, Straw    Appearance, UA Cloudy (A) Clear    pH, UA 6.0 5.0 - 8.0    Specific Gravity, UA >=1.030 1.005 - 1.030    Glucose, UA >=1000 mg/dL (3+) (A) Negative    Ketones, UA Trace (A) Negative    Bilirubin, UA Negative Negative    Blood, UA Negative Negative    Protein, UA 30 mg/dL (1+) (A) Negative    Leuk Esterase, UA Negative Negative    Nitrite, UA Negative Negative    Urobilinogen, UA 0.2 E.U./dL 0.2 - 1.0 E.U./dL   CBC Auto Differential    Specimen: Blood   Result Value Ref Range    WBC 7.54 3.40 - 10.80 10*3/mm3    RBC 4.22 3.77 - 5.28 10*6/mm3    Hemoglobin 11.7 (L) 12.0 - 15.9 g/dL    Hematocrit 36.7 34.0 - 46.6 %    MCV 87.0 79.0 - 97.0 fL    MCH 27.7 26.6 - 33.0 pg    MCHC 31.9 31.5 - 35.7 g/dL    RDW 13.2 12.3 - 15.4 %    RDW-SD 40.9 37.0 - 54.0 fl    MPV 9.7 6.0 - 12.0 fL    Platelets 461 (H) 140 - 450 10*3/mm3    Neutrophil %  57.5 42.7 - 76.0 %    Lymphocyte % 31.6 19.6 - 45.3 %    Monocyte % 8.9 5.0 - 12.0 %    Eosinophil % 1.2 0.3 - 6.2 %    Basophil % 0.5 0.0 - 1.5 %    Immature Grans % 0.3 0.0 - 0.5 %    Neutrophils, Absolute 4.34 1.70 - 7.00 10*3/mm3    Lymphocytes, Absolute 2.38 0.70 - 3.10 10*3/mm3    Monocytes, Absolute 0.67 0.10 - 0.90 10*3/mm3    Eosinophils, Absolute 0.09 0.00 - 0.40 10*3/mm3    Basophils, Absolute 0.04 0.00 - 0.20 10*3/mm3    Immature Grans, Absolute 0.02 0.00 - 0.05 10*3/mm3    nRBC 0.0 0.0 - 0.2 /100 WBC   MicroAlbumin, Urine, Random - Urine, Clean Catch    Specimen: Urine, Clean Catch   Result Value Ref Range    Microalbumin, Urine 4.0 mg/dL   TSH    Specimen: Blood   Result Value Ref Range    TSH 0.595 0.270 - 4.200 uIU/mL   Lipid Panel    Specimen: Blood   Result Value Ref Range    Total Cholesterol 170 0 - 200 mg/dL    Triglycerides 212 (H) 0 - 150 mg/dL    HDL Cholesterol 61 (H) 40 - 60 mg/dL    LDL Cholesterol  74 0 - 100 mg/dL    VLDL Cholesterol 35 5 - 40 mg/dL    LDL/HDL Ratio 1.09    Comprehensive metabolic panel    Specimen: Blood   Result Value Ref Range    Glucose 131 (H) 65 - 99 mg/dL    BUN 16 8 - 23 mg/dL    Creatinine 0.92 0.57 - 1.00 mg/dL    Sodium 138 136 - 145 mmol/L    Potassium 4.2 3.5 - 5.2 mmol/L    Chloride 103 98 - 107 mmol/L    CO2 20.0 (L) 22.0 - 29.0 mmol/L    Calcium 9.4 8.6 - 10.5 mg/dL    Total Protein 6.7 6.0 - 8.5 g/dL    Albumin 4.20 3.50 - 5.20 g/dL    ALT (SGPT) 22 1 - 33 U/L    AST (SGOT) 20 1 - 32 U/L    Alkaline Phosphatase 81 39 - 117 U/L    Total Bilirubin 0.2 0.0 - 1.2 mg/dL    Globulin 2.5 gm/dL    A/G Ratio 1.7 g/dL    BUN/Creatinine Ratio 17.4 7.0 - 25.0    Anion Gap 15.0 5.0 - 15.0 mmol/L    eGFR 70.1 >60.0 mL/min/1.73     *Note: Due to a large number of results and/or encounters for the requested time period, some results have not been displayed. A complete set of results can be found in Results Review.

## 2022-10-04 NOTE — ANESTHESIA PREPROCEDURE EVALUATION
Anesthesia Evaluation     Patient summary reviewed and Nursing notes reviewed   no history of anesthetic complications:  NPO Solid Status: > 8 hours  NPO Liquid Status: > 2 hours           Airway   Mallampati: II  TM distance: >3 FB  Neck ROM: full  No difficulty expected  Dental    (+) poor dentition    Pulmonary - normal exam   (+) a smoker Current,   Cardiovascular - normal exam    (+) hypertension, valvular problems/murmurs MVP, hyperlipidemia,       Neuro/Psych  (+) headaches, numbness,    GI/Hepatic/Renal/Endo    (+) obesity,   diabetes mellitus type 2,     Musculoskeletal     (+) back pain, neck pain, radiculopathy  Abdominal  - normal exam   Substance History      OB/GYN          Other      history of cancer                      Anesthesia Plan    ASA 3     general   total IV anesthesia  intravenous induction     Anesthetic plan, risks, benefits, and alternatives have been provided, discussed and informed consent has been obtained with: patient.

## 2022-10-05 ENCOUNTER — TELEPHONE (OUTPATIENT)
Dept: FAMILY MEDICINE CLINIC | Facility: CLINIC | Age: 62
End: 2022-10-05

## 2022-10-05 DIAGNOSIS — L98.9 PRECANCEROUS SKIN LESION: Primary | ICD-10-CM

## 2022-10-05 LAB — REF LAB TEST METHOD: NORMAL

## 2022-10-05 NOTE — TELEPHONE ENCOUNTER
Dr Eugene Sheikh only does his procedures in [place].  Pt does not want to go there to get the excision to remove the precancer on her foot.  She would like a referral to Dr Nation.

## 2022-10-18 ENCOUNTER — OFFICE VISIT (OUTPATIENT)
Dept: GASTROENTEROLOGY | Facility: CLINIC | Age: 62
End: 2022-10-18

## 2022-10-18 VITALS
WEIGHT: 191 LBS | SYSTOLIC BLOOD PRESSURE: 122 MMHG | DIASTOLIC BLOOD PRESSURE: 82 MMHG | BODY MASS INDEX: 32.61 KG/M2 | HEIGHT: 64 IN | HEART RATE: 80 BPM

## 2022-10-18 DIAGNOSIS — R13.10 DYSPHAGIA, UNSPECIFIED TYPE: ICD-10-CM

## 2022-10-18 DIAGNOSIS — R19.7 DIARRHEA, UNSPECIFIED TYPE: ICD-10-CM

## 2022-10-18 DIAGNOSIS — K21.00 GASTROESOPHAGEAL REFLUX DISEASE WITH ESOPHAGITIS WITHOUT HEMORRHAGE: ICD-10-CM

## 2022-10-18 DIAGNOSIS — K63.5 POLYP OF SIGMOID COLON, UNSPECIFIED TYPE: ICD-10-CM

## 2022-10-18 DIAGNOSIS — K22.2 PEPTIC STRICTURE OF ESOPHAGUS: ICD-10-CM

## 2022-10-18 DIAGNOSIS — R10.32 LEFT LOWER QUADRANT ABDOMINAL PAIN: Primary | ICD-10-CM

## 2022-10-18 PROCEDURE — 99214 OFFICE O/P EST MOD 30 MIN: CPT | Performed by: PHYSICIAN ASSISTANT

## 2022-12-20 RX ORDER — ESOMEPRAZOLE MAGNESIUM 40 MG/1
CAPSULE, DELAYED RELEASE ORAL
Qty: 90 CAPSULE | Refills: 0 | Status: SHIPPED | OUTPATIENT
Start: 2022-12-20 | End: 2023-03-24

## 2022-12-27 ENCOUNTER — OFFICE VISIT (OUTPATIENT)
Dept: GASTROENTEROLOGY | Facility: CLINIC | Age: 62
End: 2022-12-27
Payer: OTHER GOVERNMENT

## 2022-12-27 VITALS
DIASTOLIC BLOOD PRESSURE: 62 MMHG | HEART RATE: 84 BPM | HEIGHT: 64 IN | BODY MASS INDEX: 34.15 KG/M2 | OXYGEN SATURATION: 96 % | WEIGHT: 200 LBS | SYSTOLIC BLOOD PRESSURE: 124 MMHG

## 2022-12-27 DIAGNOSIS — R10.10 PAIN OF UPPER ABDOMEN: ICD-10-CM

## 2022-12-27 DIAGNOSIS — K76.0 FATTY LIVER: ICD-10-CM

## 2022-12-27 DIAGNOSIS — K21.00 GASTROESOPHAGEAL REFLUX DISEASE WITH ESOPHAGITIS WITHOUT HEMORRHAGE: Primary | ICD-10-CM

## 2022-12-27 PROCEDURE — 99213 OFFICE O/P EST LOW 20 MIN: CPT | Performed by: PHYSICIAN ASSISTANT

## 2022-12-27 NOTE — PROGRESS NOTES
Chief Complaint   Patient presents with   • Follow-up     2 Month   • Abdominal Pain       ENDO PROCEDURE ORDERED:    Subjective    Ines Saravia is a 62 y.o. female. she is here today for follow-up.    History of Present Illness    Patient seen on a recheck of her GERD, fatty liver, iron deficiency. Last seen 10/18/2022. Was given Levsin. She states she ran out of her reflux medicine for about a week but she wants to try to stay off of it. She has had a lot of fatigue. She states she is trying to lose weight and use GoLo but has gained 9 pounds since last visit. She denied nausea, vomiting. Last EGD/colonoscopy 10/04/2022. She had esophageal stricture dilated, gastritis, hyperplastic polyp removed and recommended repeat at 3 years. She had foot surgery last week. She stopped the Nexium about a week ago.     A/P: Patient with steatohepatitis with hepatic fibrosis. She has not had recent laboratory. She has a follow-up with Dr. Morales this week. Her last thyroid check was ok in July. He does have laboratories ordered for her and I encouraged her to get these laboratories drawn. She was encouraged to continue dietary modification and weight loss attempts. Will plan follow-up in 3 months with MCKENNA FibroSure, INR prior, further pending clinical course and the results of the above.         The following portions of the patient's history were reviewed and updated as appropriate:   Past Medical History:   Diagnosis Date   • Abnormal mammogram of right breast     right breast density Spot compression negative 2/3/16      • Benign essential hypertension    • Cancer (HCC)    • Continuous leakage of urine    • Dermatitis    • Diabetes mellitus (HCC)    • Diabetes mellitus screening    • Dysuria    • Elevated cholesterol    • Encounter for gynecological examination with abnormal finding    • Generalized headache    • H/O screening mammography    • Impacted cerumen, bilateral    • Intermittent urinary incontinence     • Localized swelling, mass and lump, head     and neck   • Mitral valve prolapse    • Neck pain    • Neck swelling    • Neuropathy     Followed by Neurology      • Overweight    • Pain    • Patient's noncompliance with other medical treatment and regimen    • Radiculopathy, cervical region     Followed by Neurology      • Screening for hyperlipidemia    • Stiffness of joint     not elsewhere classified, involving hand      • Thyroid disorder screening    • Vaginal discharge      Past Surgical History:   Procedure Laterality Date   • COLONOSCOPY N/A 09/28/2021    Procedure: COLONOSCOPY;  Surgeon: Humphrey Campbell MD;  Location: [facility] ENDOSCOPY;  Service: Gastroenterology;  Laterality: N/A;   • COLONOSCOPY N/A 10/04/2022    Procedure: COLONOSCOPY;  Surgeon: Humphrey Campbell MD;  Location: [facility] ENDOSCOPY;  Service: Gastroenterology;  Laterality: N/A;   • ENDOSCOPY N/A 09/28/2021    Procedure: ESOPHAGOGASTRODUODENOSCOPY WITH DILATATION;  Surgeon: Humphrey Campbell MD;  Location: [facility] ENDOSCOPY;  Service: Gastroenterology;  Laterality: N/A;   • ENDOSCOPY N/A 10/04/2022    Procedure: ESOPHAGOGASTRODUODENOSCOPY WITH DILATATION;  Surgeon: Humphrey Campbell MD;  Location: [facility] ENDOSCOPY;  Service: Gastroenterology;  Laterality: N/A;   • FOOT SURGERY Right 12/20/2022    mole removed by Dr. Nation   • OTHER SURGICAL HISTORY  12/19/2015    Remove Impacted Cerumen    • TUBAL ABDOMINAL LIGATION       Family History   Problem Relation Age of Onset   • Diabetes Other    • Hypertension Other    • Heart disease Other    • Hyperlipidemia Other      OB History    No obstetric history on file.       Allergies   Allergen Reactions   • Penicillins Other (See Comments)     \"knots\" on skin   • Metronidazole Unknown - Low Severity     Social History     Socioeconomic History   • Marital status:    Tobacco Use   • Smoking status: Some Days     Types: Electronic Cigarette   • Smokeless tobacco: Never   Vaping Use   • Vaping Use:  Some days   • Substances: Nicotine, Flavoring   • Devices: RefIntegraGenble tank   Substance and Sexual Activity   • Alcohol use: No   • Drug use: No   • Sexual activity: Defer     Current Medications:  Prior to Admission medications    Medication Sig Start Date End Date Taking? Authorizing Provider   atorvastatin (LIPITOR) 20 MG tablet Take 1 tablet by mouth every night at bedtime. 9/29/22  Yes Micheal Morales MD   dapagliflozin (FARXIGA) 5 MG tablet tablet Take 1 tablet by mouth Daily. 9/29/22  Yes Micheal Morales MD   glipizide (Glucotrol) 10 MG tablet Take 1 tablet by mouth 2 (Two) Times a Day Before Meals. 9/29/22  Yes Micheal Morales MD   hyoscyamine (LEVSIN) 0.125 MG SL tablet Take 1 tablet by mouth Every 4 (Four) Hours As Needed for Cramping or Diarrhea. 10/18/22  Yes Jens Mustafa PA-C   lisinopril (PRINIVIL,ZESTRIL) 20 MG tablet Take 20 mg by mouth Daily.   Yes Pete Reid MD   lisinopril-hydrochlorothiazide (PRINZIDE,ZESTORETIC) 20-12.5 MG per tablet Take 1 tablet by mouth Daily. 9/29/22  Yes Micheal Morales MD   metFORMIN (GLUCOPHAGE) 1000 MG tablet Take 1 tablet by mouth 2 (Two) Times a Day. 9/29/22  Yes Micheal Morales MD   multivitamin with minerals tablet tablet Take 1 tablet by mouth Daily.   Yes Pete Reid MD   esomeprazole (nexIUM) 40 MG capsule Take 1 capsule by mouth once daily 12/20/22   Jens Mustafa PA-C   metFORMIN (GLUCOPHAGE) 1000 MG tablet 1 each.  12/27/22  Pete Reid MD     Review of Systems  Review of Systems       Objective    /62 (BP Location: Left arm, Patient Position: Sitting)   Pulse 84   Ht 162.6 cm (64\")   Wt 90.7 kg (200 lb)   SpO2 96%   BMI 34.33 kg/m²   Physical Exam  Vitals and nursing note reviewed.   Constitutional:       General: She is not in acute distress.     Appearance: She is well-developed.      Comments: RADHA   HENT:      Head: Normocephalic and atraumatic.   Eyes:      Pupils: Pupils are  equal, round, and reactive to light.   Cardiovascular:      Rate and Rhythm: Normal rate and regular rhythm.      Heart sounds: Normal heart sounds.   Pulmonary:      Effort: Pulmonary effort is normal.      Breath sounds: Normal breath sounds.   Abdominal:      General: Bowel sounds are normal. There is no distension or abdominal bruit.      Palpations: Abdomen is soft. Abdomen is not rigid. There is no shifting dullness or mass.      Tenderness: There is abdominal tenderness. There is no guarding or rebound.      Hernia: No hernia is present. There is no hernia in the ventral area.   Musculoskeletal:         General: Normal range of motion.      Cervical back: Normal range of motion.   Skin:     General: Skin is warm and dry.   Neurological:      Mental Status: She is alert and oriented to person, place, and time.   Psychiatric:         Behavior: Behavior normal.         Thought Content: Thought content normal.         Judgment: Judgment normal.       Assessment & Plan      1. Gastroesophageal reflux disease with esophagitis without hemorrhage    2. Pain of upper abdomen    3. Fatty liver    .   Diagnoses and all orders for this visit:    1. Gastroesophageal reflux disease with esophagitis without hemorrhage (Primary)    2. Pain of upper abdomen    3. Fatty liver  -     MCKENNA Fibrosure; Future  -     Protime-INR; Future        Orders placed during this encounter include:  Orders Placed This Encounter   Procedures   • [facility]CKENNA Fibrosure     Standing Status:   Future     Number of Occurrences:   1     Standing Expiration Date:   6/25/2023     Order Specific Question:   Release to patient     Answer:   Routine Release   • Protime-INR     Standing Status:   Future     Number of Occurrences:   1     Standing Expiration Date:   6/25/2023       Medications prescribed:  No orders of the defined types were placed in this encounter.    Discontinued Medications       Reason for Discontinue     metFORMIN (GLUCOPHAGE) 1000 MG  tablet    Duplicate order        Requested Prescriptions      No prescriptions requested or ordered in this encounter       Review and/or summary of lab tests, radiology, procedures, medications. Review and summary of old records and obtaining of history. The risks and benefits of my recommendations, as well as other treatment options were discussed with the patient today. Questions were answered.    Follow-up: Return in about 3 months (around 3/27/2023), or if symptoms worsen or fail to improve, for lab prior.     * Surgery not found *      This document has been electronically signed by Jens Mustafa PA-C on January 9, 2023 18:21 CST      Results for orders placed or performed in visit on 01/05/23   POCT SARS-CoV-2 Antigen CHRIS    Specimen: Swab   Result Value Ref Range    SARS Antigen Not Detected Not Detected, Presumptive Negative    Internal Control Passed Passed    Lot Number [id number]     Expiration Date 06/04/2023    Results for orders placed or performed in visit on 12/29/22   MCKENNA Fibrosure    Specimen: Blood   Result Value Ref Range    Fibrosis Score (References) 0.02 0.00 - 0.21    Fibrosis Stage (Reference) Comment     Steatosis Score (Reference) 0.59 (H) 0.00 - 0.30    Steatosis Grade (Reference) Comment     MCKENNA Score (Reference) 0.50 (H) 0.25    Mckenna Grade (Reference) Comment     Height: (Reference) 64 in    Weight: (Reference) 195 LBS    Alpha 2-Macroglobulins, Qn 123 110 - 276 mg/dL    Haptoglobin 170 37 - 355 mg/dL    Apolipoprotein A-1 205 116 - 209 mg/dL    Total Bilirubin 0.1 0.0 - 1.2 mg/dL    GGT 33 0 - 60 IU/L    ALT (SGPT) 20 0 - 40 IU/L    AST (SGOT) P5P (Reference) 20 0 - 40 IU/L    Cholesterol, Total (Reference) 259 (H) 100 - 199 mg/dL    Glucose, Serum (Reference) 126 (H) 70 - 99 mg/dL    Triglycerides 115 0 - 149 mg/dL    Interpretations: (Reference) Comment     Fibrosis Scoring: Comment     Steatosis Grading (Reference) Comment     Mckenna Scoring (Reference) Comment     Limitations:  (Reference) Comment     Comment (Reference) Comment    Protime-INR    Specimen: Blood   Result Value Ref Range    Protime 12.7 11.1 - 15.3 Seconds    INR 0.96 0.80 - 1.20   TSH    Specimen: Blood   Result Value Ref Range    TSH 0.466 0.270 - 4.200 uIU/mL   Results for orders placed or performed in visit on 22   POC Glycated Hemoglobin, Total    Specimen: Blood   Result Value Ref Range    Hemoglobin A1C 6.8 %    Lot Number [id number]     Expiration Date 10,312,024    Results for orders placed or performed during the hospital encounter of 10/04/22   TISSUE EXAM, [facility] ([facility])    Specimen: A: Gastric, Antrum; Tissue    B: Large Intestine, Left / Descending Colon; Tissue    C: Large Intestine; Tissue    D: Large Intestine, Sigmoid Colon; Tissue   Result Value Ref Range    Reference Lab Report       [facility]  [address] [place] [address]  Phone: [phone] or [phone]  Fax: [phone]  Jose Miguel Alfaro M.D., Medical Director    PATIENT NAME                           LABORATORY NO.  [id number]  GENARO BROWNE.            [id number]  [id number]                         AGE              SEX  SSN           CLIENT REF #  [facility]           62      1960  F    [id number]   [id number]    [place]                       REQUESTING M.D.     ATTENDING M.D.     COPY TO[address]                 [place] [address]             DATE COLLECTED      DATE RECEIVED      DATE REPORTED  10/04/2022          10/04/2022         10/05/2022    DIAGNOSIS:  A.   GASTRIC ANTRUM, BIOPSY:  Mild reactive (chemical) gastropathy  Negative for intestinal metaplasia, significant active inflammation,  neoplasia, malignancy  No Helicobacter-like organisms identified on H and E    B.    DESCENDING COLON POLYP:  Small hyperplastic polyp  Negative for malignancy    C.   COLON, BIOPSY:  Benign colonic mucosa, negative  for significant architectural distortion,  inflammatory infiltrate, neoplasia, malignancy    D.   SIGMOID COLON POLYP:  Hyperplastic polyp  Negative for malignancy    CLINICAL HISTORY:  Diverticulitis, other iron deficiency anemia, dysphagia, unspecified type, pain of  upper abdomen, left lower quadrant abdominal pain    SPECIMENS RECEIVED:  A.  GASTRIC ANTRUM, BIOPSY  B.  DESCENDING COLON POLYP  C.  COLON, BIOPSY  D.  SIGMOID COLON POLYP    MICROSCOPIC DESCRIPTION:  Tissue blocks are prepared and slides are examined microscopically on all  specimens. See diagnosis for details.    Professional interpretation rendered by Cris Lopez M.D., F.C.A.P. at  [facility], [address] [place] [address].    GROSS DESCRIPTION:  A.  Specimen is received in 1 formalin filled container labeled \"gastric,  antrum\" and consists of 3 pieces of tan soft tissue  measuring 0.5 x 0.3 x 0.2  cm in aggregate.  The specimen is submitted entirely in 1 cassette.  CECI  B.  Specimen is received in 1 formalin filled container labeled \"large intestine,  left/descending colon\" and consists of 1 piece of tan soft tissue measuring  0.4 x 0.2 x 0.2 cm.  The specimen is submitted entirely in 1 cassette.  C.  Specimen is received in 1 formalin filled container labeled \"colonic  mucosa biopsy\" and consists of 2 pieces of tan soft tissue measuring 0.6 x  0.3 x 0.2 cm in aggregate.  The specimen is submitted entirely in 1  cassette.  D.  Specimen is received in 1 formalin filled container labeled \"large intestine,  sigmoid colon\" and consists of 2 pieces of tan soft tissue measuring 0.4 x  0.4 x 0.2 cm in aggregate.  Specimen is submitted entirely in 1 cassette.    REVIEWED, DIAGNOSED AND ELECTRONICALLY  SIGNED BY:    Crsi Lopez M.D., F.C.A.P.  CPT CODES:  88305x4     POC Glucose Once    Specimen: Blood   Result Value Ref Range    Glucose 136 (H) 70 - 130 mg/dL   Results for orders placed or performed in visit on 08/18/22   Vitamin  B12 and Folate    Specimen: Blood   Result Value Ref Range    Folate 18.00 4.78 - 24.20 ng/mL    Vitamin B-12 818 211 - 946 pg/mL   CBC Auto Differential    Specimen: Blood   Result Value Ref Range    WBC 5.57 3.40 - 10.80 10*3/mm3    RBC 4.05 3.77 - 5.28 10*6/mm3    Hemoglobin 11.3 (L) 12.0 - 15.9 g/dL    Hematocrit 35.2 34.0 - 46.6 %    MCV 86.9 79.0 - 97.0 fL    MCH 27.9 26.6 - 33.0 pg    MCHC 32.1 31.5 - 35.7 g/dL    RDW 12.9 12.3 - 15.4 %    RDW-SD 39.9 37.0 - 54.0 fl    MPV 10.3 6.0 - 12.0 fL    Platelets 369 140 - 450 10*3/mm3    Neutrophil % 54.8 42.7 - 76.0 %    Lymphocyte % 34.1 19.6 - 45.3 %    Monocyte % 9.0 5.0 - 12.0 %    Eosinophil % 1.4 0.3 - 6.2 %    Basophil % 0.5 0.0 - 1.5 %    Immature Grans % 0.2 0.0 - 0.5 %    Neutrophils, Absolute 3.05 1.70 - 7.00 10*3/mm3    Lymphocytes, Absolute 1.90 0.70 - 3.10 10*3/mm3    Monocytes, Absolute 0.50 0.10 - 0.90 10*3/mm3    Eosinophils, Absolute 0.08 0.00 - 0.40 10*3/mm3    Basophils, Absolute 0.03 0.00 - 0.20 10*3/mm3    Immature Grans, Absolute 0.01 0.00 - 0.05 10*3/mm3    nRBC 0.0 0.0 - 0.2 /100 WBC   Reticulocytes    Specimen: Blood   Result Value Ref Range    Reticulocyte % 1.54 0.70 - 1.90 %    Reticulocyte Absolute 0.0624 0.0200 - 0.1300 10*6/mm3   Iron    Specimen: Blood   Result Value Ref Range    Iron 62 37 - 145 mcg/dL   Ferritin    Specimen: Blood   Result Value Ref Range    Ferritin 314.00 (H) 13.00 - 150.00 ng/mL   Results for orders placed or performed in visit on 07/27/22   Urinalysis, Microscopic Only - Urine, Clean Catch    Specimen: Urine, Clean Catch   Result Value Ref Range    RBC, UA 0-2 None Seen, 0-2 /HPF    WBC, UA 0-2 None Seen, 0-2 /HPF    Bacteria, UA None Seen None Seen /HPF    Squamous Epithelial Cells, UA 0-2 None Seen, 0-2 /HPF    Hyaline Casts, UA 3-6 None Seen /LPF    Calcium Oxalate Crystals, UA Moderate/2+ None Seen /HPF    Methodology Manual Light Microscopy    Urinalysis With Culture If Indicated - Urine, Clean Catch     Specimen: Urine, Clean Catch   Result Value Ref Range    Color, UA Dark Yellow (A) Yellow, Straw    Appearance, UA Cloudy (A) Clear    pH, UA 6.0 5.0 - 8.0    Specific Gravity, UA >=1.030 1.005 - 1.030    Glucose, UA >=1000 mg/dL (3+) (A) Negative    Ketones, UA Trace (A) Negative    Bilirubin, UA Negative Negative    Blood, UA Negative Negative    Protein, UA 30 mg/dL (1+) (A) Negative    Leuk Esterase, UA Negative Negative    Nitrite, UA Negative Negative    Urobilinogen, UA 0.2 E.U./dL 0.2 - 1.0 E.U./dL   CBC Auto Differential    Specimen: Blood   Result Value Ref Range    WBC 7.54 3.40 - 10.80 10*3/mm3    RBC 4.22 3.77 - 5.28 10*6/mm3    Hemoglobin 11.7 (L) 12.0 - 15.9 g/dL    Hematocrit 36.7 34.0 - 46.6 %    MCV 87.0 79.0 - 97.0 fL    MCH 27.7 26.6 - 33.0 pg    MCHC 31.9 31.5 - 35.7 g/dL    RDW 13.2 12.3 - 15.4 %    RDW-SD 40.9 37.0 - 54.0 fl    MPV 9.7 6.0 - 12.0 fL    Platelets 461 (H) 140 - 450 10*3/mm3    Neutrophil % 57.5 42.7 - 76.0 %    Lymphocyte % 31.6 19.6 - 45.3 %    Monocyte % 8.9 5.0 - 12.0 %    Eosinophil % 1.2 0.3 - 6.2 %    Basophil % 0.5 0.0 - 1.5 %    Immature Grans % 0.3 0.0 - 0.5 %    Neutrophils, Absolute 4.34 1.70 - 7.00 10*3/mm3    Lymphocytes, Absolute 2.38 0.70 - 3.10 10*3/mm3    Monocytes, Absolute 0.67 0.10 - 0.90 10*3/mm3    Eosinophils, Absolute 0.09 0.00 - 0.40 10*3/mm3    Basophils, Absolute 0.04 0.00 - 0.20 10*3/mm3    Immature Grans, Absolute 0.02 0.00 - 0.05 10*3/mm3    nRBC 0.0 0.0 - 0.2 /100 WBC     *Note: Due to a large number of results and/or encounters for the requested time period, some results have not been displayed. A complete set of results can be found in Results Review.

## 2022-12-28 RX ORDER — ESOMEPRAZOLE MAGNESIUM 40 MG/1
CAPSULE, DELAYED RELEASE ORAL
Qty: 90 CAPSULE | Refills: 0 | OUTPATIENT
Start: 2022-12-28

## 2022-12-29 ENCOUNTER — LAB (OUTPATIENT)
Dept: LAB | Facility: HOSPITAL | Age: 62
End: 2022-12-29
Payer: OTHER GOVERNMENT

## 2022-12-29 ENCOUNTER — OFFICE VISIT (OUTPATIENT)
Dept: FAMILY MEDICINE CLINIC | Facility: CLINIC | Age: 62
End: 2022-12-29

## 2022-12-29 VITALS
HEIGHT: 64 IN | TEMPERATURE: 97.3 F | WEIGHT: 195.3 LBS | DIASTOLIC BLOOD PRESSURE: 74 MMHG | HEART RATE: 84 BPM | BODY MASS INDEX: 33.34 KG/M2 | OXYGEN SATURATION: 96 % | SYSTOLIC BLOOD PRESSURE: 124 MMHG

## 2022-12-29 DIAGNOSIS — E11.69 TYPE 2 DIABETES MELLITUS WITH OTHER SPECIFIED COMPLICATION, WITHOUT LONG-TERM CURRENT USE OF INSULIN: Chronic | ICD-10-CM

## 2022-12-29 DIAGNOSIS — E66.09 CLASS 1 OBESITY DUE TO EXCESS CALORIES WITH SERIOUS COMORBIDITY AND BODY MASS INDEX (BMI) OF 33.0 TO 33.9 IN ADULT: Chronic | ICD-10-CM

## 2022-12-29 DIAGNOSIS — K76.0 FATTY LIVER: ICD-10-CM

## 2022-12-29 DIAGNOSIS — G89.29 CHRONIC LEFT SHOULDER PAIN: ICD-10-CM

## 2022-12-29 DIAGNOSIS — R63.5 WEIGHT GAIN: ICD-10-CM

## 2022-12-29 DIAGNOSIS — E78.49 OTHER HYPERLIPIDEMIA: Chronic | ICD-10-CM

## 2022-12-29 DIAGNOSIS — I10 ESSENTIAL HYPERTENSION: Chronic | ICD-10-CM

## 2022-12-29 DIAGNOSIS — R07.9 CHEST PAIN, UNSPECIFIED TYPE: Chronic | ICD-10-CM

## 2022-12-29 DIAGNOSIS — M25.512 CHRONIC LEFT SHOULDER PAIN: ICD-10-CM

## 2022-12-29 LAB
EXPIRATION DATE: ABNORMAL
HBA1C MFR BLD: 6.8 %
INR PPP: 0.96 (ref 0.8–1.2)
Lab: ABNORMAL
PROTHROMBIN TIME: 12.7 SECONDS (ref 11.1–15.3)
TSH SERPL DL<=0.05 MIU/L-ACNC: 0.47 UIU/ML (ref 0.27–4.2)

## 2022-12-29 PROCEDURE — 82172 ASSAY OF APOLIPOPROTEIN: CPT

## 2022-12-29 PROCEDURE — 83036 HEMOGLOBIN GLYCOSYLATED A1C: CPT | Performed by: FAMILY MEDICINE

## 2022-12-29 PROCEDURE — 85610 PROTHROMBIN TIME: CPT

## 2022-12-29 PROCEDURE — 84478 ASSAY OF TRIGLYCERIDES: CPT

## 2022-12-29 PROCEDURE — 82947 ASSAY GLUCOSE BLOOD QUANT: CPT

## 2022-12-29 PROCEDURE — 82465 ASSAY BLD/SERUM CHOLESTEROL: CPT

## 2022-12-29 PROCEDURE — 83883 ASSAY NEPHELOMETRY NOT SPEC: CPT

## 2022-12-29 PROCEDURE — 83010 ASSAY OF HAPTOGLOBIN QUANT: CPT

## 2022-12-29 PROCEDURE — 99214 OFFICE O/P EST MOD 30 MIN: CPT | Performed by: FAMILY MEDICINE

## 2022-12-29 PROCEDURE — 84443 ASSAY THYROID STIM HORMONE: CPT

## 2022-12-29 PROCEDURE — 84450 TRANSFERASE (AST) (SGOT): CPT

## 2022-12-29 PROCEDURE — 84460 ALANINE AMINO (ALT) (SGPT): CPT

## 2022-12-29 PROCEDURE — 82247 BILIRUBIN TOTAL: CPT

## 2022-12-29 PROCEDURE — 82977 ASSAY OF GGT: CPT

## 2022-12-29 RX ORDER — ATORVASTATIN CALCIUM 20 MG/1
20 TABLET, FILM COATED ORAL
Qty: 90 TABLET | Refills: 1 | Status: SHIPPED | OUTPATIENT
Start: 2022-12-29

## 2022-12-29 RX ORDER — GLIPIZIDE 10 MG/1
10 TABLET ORAL
Qty: 180 TABLET | Refills: 1 | Status: SHIPPED | OUTPATIENT
Start: 2022-12-29

## 2022-12-29 RX ORDER — LISINOPRIL AND HYDROCHLOROTHIAZIDE 20; 12.5 MG/1; MG/1
1 TABLET ORAL DAILY
Qty: 90 TABLET | Refills: 1 | Status: SHIPPED | OUTPATIENT
Start: 2022-12-29
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
## 2023-01-03 LAB
A2 MACROGLOB SERPL-MCNC: 123 MG/DL (ref 110–276)
ALT SERPL W P-5'-P-CCNC: 20 IU/L (ref 0–40)
APO A-I SERPL-MCNC: 205 MG/DL (ref 116–209)
AST SERPL W P-5'-P-CCNC: 20 IU/L (ref 0–40)
BILIRUB SERPL-MCNC: 0.1 MG/DL (ref 0–1.2)
CHOLEST SERPL-MCNC: 259 MG/DL (ref 100–199)
FIBROSIS SCORING:: ABNORMAL
FIBROSIS STAGE SERPL QL: ABNORMAL
GGT SERPL-CCNC: 33 IU/L (ref 0–60)
GLUCOSE SERPL-MCNC: 126 MG/DL (ref 70–99)
HAPTOGLOB SERPL-MCNC: 170 MG/DL (ref 37–355)
INTERPRETATIONS: (REFERENCE): ABNORMAL
LABORATORY COMMENT REPORT: ABNORMAL
LIVER FIBR SCORE SERPL CALC.FIBROSURE: 0.02 (ref 0–0.21)
NASH SCORING (REFERENCE): ABNORMAL
NECROINFLAMMATORY ACT GRADE SERPL QL: ABNORMAL
NECROINFLAMMATORY ACT SCORE SERPL: 0.5
SERVICE CMNT-IMP: ABNORMAL
STEATOSIS GRADE (REFERENCE): ABNORMAL
STEATOSIS GRADING (REFERENCE): ABNORMAL
STEATOSIS SCORE (REFERENCE): 0.59 (ref 0–0.3)
TRIGL SERPL-MCNC: 115 MG/DL (ref 0–149)

## 2023-01-05 ENCOUNTER — OFFICE VISIT (OUTPATIENT)
Dept: FAMILY MEDICINE CLINIC | Facility: CLINIC | Age: 63
End: 2023-01-05
Payer: OTHER GOVERNMENT

## 2023-01-05 VITALS
SYSTOLIC BLOOD PRESSURE: 158 MMHG | WEIGHT: 195 LBS | DIASTOLIC BLOOD PRESSURE: 80 MMHG | HEIGHT: 64 IN | OXYGEN SATURATION: 97 % | HEART RATE: 76 BPM | TEMPERATURE: 98 F | BODY MASS INDEX: 33.29 KG/M2

## 2023-01-05 DIAGNOSIS — R10.32 LLQ ABDOMINAL PAIN: ICD-10-CM

## 2023-01-05 DIAGNOSIS — R11.0 NAUSEA: ICD-10-CM

## 2023-01-05 DIAGNOSIS — R19.7 ACUTE DIARRHEA: Primary | ICD-10-CM

## 2023-01-05 DIAGNOSIS — Z20.822 CLOSE EXPOSURE TO COVID-19 VIRUS: ICD-10-CM

## 2023-01-05 LAB
EXPIRATION DATE: NORMAL
INTERNAL CONTROL: NORMAL
Lab: NORMAL
SARS-COV-2 AG UPPER RESP QL IA.RAPID: NOT DETECTED

## 2023-01-05 PROCEDURE — 99214 OFFICE O/P EST MOD 30 MIN: CPT | Performed by: NURSE PRACTITIONER

## 2023-01-05 PROCEDURE — 87426 SARSCOV CORONAVIRUS AG IA: CPT | Performed by: NURSE PRACTITIONER

## 2023-01-05 RX ORDER — METRONIDAZOLE 500 MG/1
500 TABLET ORAL 3 TIMES DAILY
Qty: 15 TABLET | Refills: 0 | Status: SHIPPED | OUTPATIENT
Start: 2023-01-05 | End: 2023-01-10

## 2023-01-05 RX ORDER — SULFAMETHOXAZOLE AND TRIMETHOPRIM 800; 160 MG/1; MG/1
1 TABLET ORAL 2 TIMES DAILY
Qty: 10 TABLET | Refills: 0 | Status: SHIPPED | OUTPATIENT
Start: 2023-01-05 | End: 2023-01-10

## 2023-01-05 RX ORDER — ONDANSETRON 8 MG/1
8 TABLET, ORALLY DISINTEGRATING ORAL EVERY 8 HOURS PRN
Qty: 15 TABLET | Refills: 0 | Status: SHIPPED | OUTPATIENT
Start: 2023-01-05 | End: 2023-02-20

## 2023-01-05 RX ORDER — COVID-19 ANTIGEN TEST
1 KIT MISCELLANEOUS TAKE AS DIRECTED
Qty: 2 KIT | Refills: 0 | COMMUNITY
Start: 2023-01-05 | End: 2023-04-06

## 2023-01-05 NOTE — PROGRESS NOTES
Chief Complaint  Diarrhea (X 2 days)    Subjective          Ines Diamond Saravia presents to [facility] [place]    History of Present Illness  [facility]    PCP: Dr. Morales    CC: \"diarrhea, exposure to Covid\"    Reports she was around her daughter and she recently tested + for Covid.  Has not done home Covid test at this point. Started having watery stools yesterday.  Reports she has been eating a lot of popcorn recently.  Has had some LLQ pain x 4 days with loose stools beginning yesterday.  No fever.  Mild nausea.  Hx of diverticulitis--noted on CT in July and treated.  Took Levsin earlier and did seem to slow the diarrhea some.       Diarrhea   This is a new problem. The current episode started yesterday. The problem occurs 5 to 10 times per day. The problem has been unchanged. The stool consistency is described as watery. The patient states that diarrhea awakens her from sleep. Associated symptoms include abdominal pain (LLQ). Pertinent negatives include no arthralgias, bloating, chills, coughing, fever, headaches, increased  flatus, myalgias, sweats, URI, vomiting ( +nausea) or weight loss. Risk factors: eating a lot of popcorn recently. She has tried anti-motility drug for the symptoms. The treatment provided mild relief. +diverticulitis in July 2022       Review of Systems   Constitutional: Positive for appetite change. Negative for chills, fever and weight loss.   HENT: Negative.    Eyes: Negative.    Respiratory: Negative.  Negative for cough.    Cardiovascular: Negative.    Gastrointestinal: Positive for abdominal pain (LLQ), diarrhea and nausea. Negative for bloating, blood in stool, constipation, flatus and vomiting ( +nausea).   Genitourinary: Negative.    Musculoskeletal: Negative.  Negative for arthralgias and myalgias.   Neurological: Negative for dizziness and headaches.        Objective   Vital Signs:   /80 (BP Location:  Right arm, Patient Position: Sitting, Cuff Size: Adult)   Pulse 76   Temp 98 °F (36.7 °C) (Oral)   Ht 162.6 cm (64\")   Wt 88.5 kg (195 lb)   SpO2 97%   BMI 33.47 kg/m²       Physical Exam  Vitals and nursing note reviewed.   Constitutional:       General: She is not in acute distress.     Appearance: She is not ill-appearing.   HENT:      Head: Normocephalic and atraumatic.   Cardiovascular:      Rate and Rhythm: Normal rate and regular rhythm.   Pulmonary:      Effort: Pulmonary effort is normal. No respiratory distress.      Breath sounds: Normal breath sounds. No wheezing, rhonchi or rales.   Abdominal:      General: Bowel sounds are decreased.      Tenderness: There is abdominal tenderness in the left lower quadrant. There is no right CVA tenderness, left CVA tenderness, guarding or rebound.   Musculoskeletal:      Cervical back: Neck supple.   Skin:     General: Skin is warm and dry.   Neurological:      General: No focal deficit present.      Mental Status: She is alert and oriented to person, place, and time.   Psychiatric:         Mood and Affect: Mood normal.         Thought Content: Thought content normal.          Result Review :     Common labs    Common Labs 7/27/22 7/27/22 7/27/22 7/27/22 9/6/22 12/29/22 12/29/22    1134 1134 1134 1134  0948 1148   Glucose  131 (A)        BUN  16        Creatinine  0.92        Sodium  138        Potassium  4.2        Chloride  103        Calcium  9.4        Albumin  4.20        Total Bilirubin  0.2        Alkaline Phosphatase  81        AST (SGOT)  20        ALT (SGPT)  22        WBC 7.54    5.57     Hemoglobin 11.7 (A)    11.3 (A)     Hematocrit 36.7    35.2     Platelets 461 (A)    369     Total Cholesterol   170       Triglycerides   212 (A)   115    HDL Cholesterol   61 (A)       LDL Cholesterol    74       Hemoglobin A1C       6.8   Microalbumin, Urine    4.0      (A) Abnormal value            Data reviewed: Radiologic studies CT abd/pelvis and ER notes from  July 2022 reviewed          Assessment and Plan    Diagnoses and all orders for this visit:    1. Acute diarrhea (Primary)  -     POCT SARS-CoV-2 Antigen CHRIS    2. Close exposure to COVID-19 virus  -     POCT SARS-CoV-2 Antigen CHRIS  -     COVID-19 At Home Antigen Test ([employer] At-Home Covid-19 Test) kit; 1 kit by In Vitro route Take As Directed.  Dispense: 2 kit; Refill: 0    3. Nausea  -     ondansetron ODT (ZOFRAN-ODT) 8 MG disintegrating tablet; Place 1 tablet on the tongue Every 8 (Eight) Hours As Needed for Nausea or Vomiting.  Dispense: 15 tablet; Refill: 0    4. LLQ abdominal pain    Other orders  -     metroNIDAZOLE (Flagyl) 500 MG tablet; Take 1 tablet by mouth 3 (Three) Times a Day for 5 days.  Dispense: 15 tablet; Refill: 0  -     sulfamethoxazole-trimethoprim (Bactrim DS) 800-160 MG per tablet; Take 1 tablet by mouth 2 (Two) Times a Day for 5 days.  Dispense: 10 tablet; Refill: 0      Suspect possible diverticulitis due to LLQ/diarrhea and recent diet--Rx for Bactrim, Metronidazole x 5 days sent.  Metronidazole on allergy list, but had in July at ER visit and reports she tolerated well and wishes to try again.  Liquid only diet for the next couple of days  Rx for Zofran PRN nausea  Continue with Levsin PRN    ER if worsening pain, fever or vomiting develop    At home Covid tests provided if she wants to recheck in a couple of days due to exposure    See PCP for routine f/u visit and management of chronic medical conditions      This document has been electronically signed by SINAI Dodson on January 5, 2023 17:11 CST,.    I spent 30 minutes caring for Ines on this date of service. This time includes time spent by me in the following activities:preparing for the visit, reviewing tests, obtaining and/or reviewing a separately obtained history, performing a medically appropriate examination and/or evaluation , counseling and educating the patient/family/caregiver, ordering medications, tests, or  procedures and documenting information in the medical record

## 2023-02-16 ENCOUNTER — OFFICE VISIT (OUTPATIENT)
Dept: CARDIOLOGY | Facility: CLINIC | Age: 63
End: 2023-02-16
Payer: OTHER GOVERNMENT

## 2023-02-16 VITALS
WEIGHT: 200 LBS | TEMPERATURE: 97.7 F | OXYGEN SATURATION: 98 % | HEIGHT: 64 IN | DIASTOLIC BLOOD PRESSURE: 78 MMHG | SYSTOLIC BLOOD PRESSURE: 148 MMHG | BODY MASS INDEX: 34.15 KG/M2 | HEART RATE: 72 BPM

## 2023-02-16 DIAGNOSIS — E78.2 MIXED HYPERLIPIDEMIA: ICD-10-CM

## 2023-02-16 DIAGNOSIS — R00.2 PALPITATIONS: ICD-10-CM

## 2023-02-16 DIAGNOSIS — I10 ESSENTIAL HYPERTENSION: Primary | ICD-10-CM

## 2023-02-16 DIAGNOSIS — R07.2 PRECORDIAL PAIN: ICD-10-CM

## 2023-02-16 DIAGNOSIS — E11.65 TYPE 2 DIABETES MELLITUS WITH HYPERGLYCEMIA, WITHOUT LONG-TERM CURRENT USE OF INSULIN: Chronic | ICD-10-CM

## 2023-02-16 LAB
QT INTERVAL: 428 MS
QTC INTERVAL: 468 MS

## 2023-02-16 PROCEDURE — 99244 OFF/OP CNSLTJ NEW/EST MOD 40: CPT | Performed by: INTERNAL MEDICINE

## 2023-02-16 PROCEDURE — 93000 ELECTROCARDIOGRAM COMPLETE: CPT | Performed by: INTERNAL MEDICINE

## 2023-02-16 NOTE — PROGRESS NOTES
"Ines Saravia  62 y.o. female    02/16/2023  1. Essential hypertension    2. Precordial pain    3. Mixed hyperlipidemia    4. Type 2 diabetes mellitus with hyperglycemia, without long-term current use of insulin (HCC)        History of Present Illness  Ines Saravia is a 62-year-old -American lady who was referred by  for evaluation of recurrent episodes of chest pain.  The patient indicates that she has had chest pain going back several years but has been worse recently.  She has had multiple visits to [facility] emergency room.  No definite etiology has been identified.  She has multiple risk factors for coronary artery disease including longstanding hypertension, diabetes mellitus, hyperlipidemia and positive family history for CAD.  She has a history of tobacco use in the past but now uses electronic cigarettes containing nicotine.  She carries a diagnosis of mitral valve prolapse in the past.  The patient does report that the pain sometimes is in her left arm and in the chest and her pain is frequently associated with skipped beats.  She does report intermittent fluttering going back several years.  No sustained palpitations reported.  No dizziness or syncope noted.    On review of her history she has had an exercise treadmill stress test back in 2018 which was negative for myocardial ischemia.  An echocardiogram back then showed normal LV systolic function with no significant valve abnormalities.    EKG today showed sinus rhythm with heart rate of 72 bpm.  Early repolarization.  QTc intervals 468 ms    SUBJECTIVE    Allergies   Allergen Reactions   • Penicillins Other (See Comments)     \"knots\" on skin   • Metronidazole Unknown - Low Severity         Past Medical History:   Diagnosis Date   • Abnormal mammogram of right breast     right breast density Spot compression negative 2/3/16      • Benign essential hypertension    • Cancer (HCC)    • Continuous leakage " of urine    • Dermatitis    • Diabetes mellitus (HCC)    • Diabetes mellitus screening    • Dysuria    • Elevated cholesterol    • Encounter for gynecological examination with abnormal finding    • Generalized headache    • H/O screening mammography    • Impacted cerumen, bilateral    • Intermittent urinary incontinence    • Localized swelling, mass and lump, head     and neck   • Mitral valve prolapse    • Neck pain    • Neck swelling    • Neuropathy     Followed by Neurology      • Overweight    • Pain    • Patient's noncompliance with other medical treatment and regimen    • Radiculopathy, cervical region     Followed by Neurology      • Screening for hyperlipidemia    • Stiffness of joint     not elsewhere classified, involving hand      • Thyroid disorder screening    • Vaginal discharge          Past Surgical History:   Procedure Laterality Date   • COLONOSCOPY N/A 09/28/2021    Procedure: COLONOSCOPY;  Surgeon: Humphrey Campbell MD;  Location: [facility] ENDOSCOPY;  Service: Gastroenterology;  Laterality: N/A;   • COLONOSCOPY N/A 10/04/2022    Procedure: COLONOSCOPY;  Surgeon: Humphrey Campbell MD;  Location: [facility] ENDOSCOPY;  Service: Gastroenterology;  Laterality: N/A;   • ENDOSCOPY N/A 09/28/2021    Procedure: ESOPHAGOGASTRODUODENOSCOPY WITH DILATATION;  Surgeon: Humphrey Campbell MD;  Location: [facility] ENDOSCOPY;  Service: Gastroenterology;  Laterality: N/A;   • ENDOSCOPY N/A 10/04/2022    Procedure: ESOPHAGOGASTRODUODENOSCOPY WITH DILATATION;  Surgeon: Humphrey Campbell MD;  Location: [facility] ENDOSCOPY;  Service: Gastroenterology;  Laterality: N/A;   • FOOT SURGERY Right 12/20/2022    mole removed by Dr. Nation   • OTHER SURGICAL HISTORY  12/19/2015    Remove Impacted Cerumen    • TUBAL ABDOMINAL LIGATION           Family History   Problem Relation Age of Onset   • Diabetes Other    • Hypertension Other    • Heart disease Other    • Hyperlipidemia Other          Social History     Socioeconomic History   •  "Marital status:    Tobacco Use   • Smoking status: Some Days     Types: Electronic Cigarette   • Smokeless tobacco: Never   Vaping Use   • Vaping Use: Some days   • Substances: Nicotine, Flavoring   • Devices: Refillable tank   Substance and Sexual Activity   • Alcohol use: No   • Drug use: No   • Sexual activity: Defer         Current Outpatient Medications   Medication Sig Dispense Refill   • atorvastatin (LIPITOR) 20 MG tablet Take 1 tablet by mouth every night at bedtime. 90 tablet 1   • COVID-19 At Home Antigen Test (QuickVue At-Home Covid-19 Test) kit 1 kit by In Vitro route Take As Directed. 2 kit 0   • dapagliflozin (FARXIGA) 5 MG tablet tablet Take 1 tablet by mouth Daily. 90 tablet 1   • esomeprazole (nexIUM) 40 MG capsule Take 1 capsule by mouth once daily 90 capsule 0   • glipizide (Glucotrol) 10 MG tablet Take 1 tablet by mouth 2 (Two) Times a Day Before Meals. 180 tablet 1   • lisinopril (PRINIVIL,ZESTRIL) 20 MG tablet Take 20 mg by mouth Daily.     • lisinopril-hydrochlorothiazide (PRINZIDE,ZESTORETIC) 20-12.5 MG per tablet Take 1 tablet by mouth Daily. 90 tablet 1   • metFORMIN (GLUCOPHAGE) 1000 MG tablet Take 1 tablet by mouth 2 (Two) Times a Day. 180 tablet 1   • multivitamin with minerals tablet tablet Take 1 tablet by mouth Daily.     • ondansetron ODT (ZOFRAN-ODT) 8 MG disintegrating tablet Place 1 tablet on the tongue Every 8 (Eight) Hours As Needed for Nausea or Vomiting. 15 tablet 0     No current facility-administered medications for this visit.         OBJECTIVE    /78 (BP Location: Left arm, Patient Position: Sitting, Cuff Size: Adult)   Temp 97.7 °F (36.5 °C)   Ht 162.6 cm (64\")   Wt 90.7 kg (200 lb)   SpO2 98%   BMI 34.33 kg/m²         Review of Systems     Constitutional:  Denies recent weight loss, weight gain, fever or chills, no change in exercise tolerance     HENT:  Denies any hearing loss, epistaxis, hoarseness, or difficulty speaking.     Eyes: Wears eyeglasses " or contact lenses     Respiratory:  Denies dyspnea with exertion, no cough, wheezing, or hemoptysis.     Cardiovascular: See HPI    Gastrointestinal:  Denies change in bowel habits, dyspepsia, ulcer disease, hematochezia, or melena.  History of dysphagia in the past    Endocrine: Negative for cold intolerance, heat intolerance, polydipsia, polyphagia and polyuria. Denies any history of weight change, heat/cold intolerance, polydipsia, polyuria     Genitourinary: Negative.      Musculoskeletal: Denies any history of arthritic symptoms or back problems     Skin:  Denies any change in hair or nails, rashes, or skin lesions.     Allergic/Immunologic: Negative.  Negative for environmental allergies, food allergies and/or immunocompromised state.     Neurological:  Denies any history of recurrent headaches, strokes, TIA, or seizure disorder.     Hematological: Denies any food allergies, seasonal allergies, bleeding disorders, or lymphadenopathy.     Psychiatric/Behavioral: Denies any history of depression, substance abuse, or change in cognitive function.       Physical Exam     Constitutional: Cooperative, alert and oriented, well-developed, well-nourished, in no acute distress.  BMI 34.3    HENT:   Head: Normocephalic, normal hair patterns, no masses or tenderness.  Ears, Nose, and Throat: No gross abnormalities. No pallor or cyanosis. Dentition good.   Eyes: EOMS intact, PERRL, conjunctivae and lids unremarkable. Fundoscopic exam and visual fields not performed.   Neck: No palpable masses or adenopathy, no thyromegaly, no JVD, carotid pulses are full and equal bilaterally and without bruit.     Cardiovascular: Regular rhythm, S1 and S2 normal, no S3 or S4.  No murmurs, gallops, or rubs detected.     Pulmonary/Chest: Chest: normal symmetry, normal respiratory excursion, no intercostal retraction, no use of accessory muscles.     Pulmonary: Normal breath sounds. No rales or rhonchi.    Abdominal: Abdomen soft, bowel  sounds normoactive, no masses, no hepatosplenomegaly, nontender, no bruit.     Musculoskeletal: No deformities, clubbing, cyanosis, erythema, or edema observed. There are no spinal abnormalities noted. Normal muscle strength and tone. Pulses full and equal in all extremities, no bruit auscultated.     Neurological: No gross motor or sensory deficits noted, affect appropriate, oriented to time, person, place.     Skin: Warm and dry to the touch, no apparent skin lesion or mass noted.     Psychiatric: She has a normal mood and affect. Her behavior is normal. Judgment and thought content normal.         Procedures      Lab Results   Component Value Date    WBC 5.57 09/06/2022    HGB 11.3 (L) 09/06/2022    HCT 35.2 09/06/2022    MCV 86.9 09/06/2022     09/06/2022     Lab Results   Component Value Date    GLUCOSE 131 (H) 07/27/2022    BUN 16 07/27/2022    CREATININE 0.92 07/27/2022    EGFRIFAFRI 87 07/15/2021    BCR 17.4 07/27/2022    CO2 20.0 (L) 07/27/2022    CALCIUM 9.4 07/27/2022    ALBUMIN 4.20 07/27/2022    AST 20 07/27/2022    ALT 22 07/27/2022     Lab Results   Component Value Date    CHOL 170 07/27/2022    CHOL 188 07/15/2021    CHOL 194 02/27/2018     Lab Results   Component Value Date    TRIG 115 12/29/2022    TRIG 212 (H) 07/27/2022    TRIG 89 07/15/2021     Lab Results   Component Value Date    HDL 61 (H) 07/27/2022    HDL 65 (H) 07/15/2021    HDL 68 02/27/2018     No components found for: LDLCALC  Lab Results   Component Value Date    LDL 74 07/27/2022     (H) 07/15/2021     02/27/2018     No results found for: HDLLDLRATIO  No components found for: CHOLHDL  Lab Results   Component Value Date    HGBA1C 6.8 12/29/2022     Lab Results   Component Value Date    TSH 0.466 12/29/2022           ASSESSMENT AND PLAN  Ines Saravia is a 62-year-old female who has presented for evaluation of multiple cardiac symptomatology including recurrent chest pain at times radiating to the left arm,  palpitation, questionable history of mitral valve prolapse in the past, in the background of longstanding hypertension, diabetes mellitus, hyperlipidemia, tobacco use and positive family history for CAD.  She reports multiple visits to the emergency room and multiple evaluations in the past.    To definitively rule out coronary artery disease the plan would be to proceed with a CT angiogram of the coronary arteries.  This will also assess her pulmonary status and other potential causes for chest pain.  An echocardiogram to assess left ventricular and valvular function has been arranged.  Her lab results from July 2022 were reviewed and LDL was 74 and HDL 61.    To optimize her heart rate, metoprolol to heart rate has been prescribed prior to the procedure.  No changes in medications have been made.  Antihypertensive therapy with lisinopril/HCTZ and lisinopril, lipid-lowering therapy with atorvastatin has been continued.  Aggressive risk factor modification and tobacco use cessation recommended.    Thank you for asked me to see this patient.    Diagnoses and all orders for this visit:    1. Essential hypertension (Primary)  -     ECG 12 Lead    2. Precordial pain    3. Mixed hyperlipidemia    4. Type 2 diabetes mellitus with hyperglycemia, without long-term current use of insulin (HCC)        BMI is >= 30 and <35. (Class 1 Obesity). The following options were offered after discussion;: nutrition counseling/recommendations      Ines Saravia  reports that she has been smoking electronic cigarette. She has never used smokeless tobacco.. I have educated her on the risk of diseases from using tobacco products such as cancer, COPD and heart disease.       I spent 3  minutes counseling the patient.               Roxie Raza MD  2/16/2023  09:40 CST

## 2023-02-20 ENCOUNTER — OFFICE VISIT (OUTPATIENT)
Dept: ORTHOPEDIC SURGERY | Facility: CLINIC | Age: 63
End: 2023-02-20
Payer: OTHER GOVERNMENT

## 2023-02-20 VITALS — BODY MASS INDEX: 34.15 KG/M2 | WEIGHT: 200 LBS | HEIGHT: 64 IN

## 2023-02-20 DIAGNOSIS — M24.812 INTERNAL DERANGEMENT OF LEFT SHOULDER: ICD-10-CM

## 2023-02-20 DIAGNOSIS — G89.29 CHRONIC LEFT SHOULDER PAIN: Primary | ICD-10-CM

## 2023-02-20 DIAGNOSIS — M75.42 IMPINGEMENT SYNDROME OF LEFT SHOULDER: ICD-10-CM

## 2023-02-20 DIAGNOSIS — G89.29 CHRONIC ELBOW PAIN, LEFT: ICD-10-CM

## 2023-02-20 DIAGNOSIS — M25.512 CHRONIC LEFT SHOULDER PAIN: Primary | ICD-10-CM

## 2023-02-20 DIAGNOSIS — M25.522 CHRONIC ELBOW PAIN, LEFT: ICD-10-CM

## 2023-02-20 PROCEDURE — 99214 OFFICE O/P EST MOD 30 MIN: CPT | Performed by: NURSE PRACTITIONER

## 2023-02-20 PROCEDURE — 20610 DRAIN/INJ JOINT/BURSA W/O US: CPT | Performed by: NURSE PRACTITIONER

## 2023-02-20 RX ORDER — MELOXICAM 15 MG/1
15 TABLET ORAL DAILY
Qty: 30 TABLET | Refills: 0 | Status: SHIPPED | OUTPATIENT
Start: 2023-02-20 | End: 2023-03-22

## 2023-02-20 RX ORDER — LIDOCAINE HYDROCHLORIDE 10 MG/ML
2 INJECTION, SOLUTION INFILTRATION; PERINEURAL
Status: COMPLETED | OUTPATIENT
Start: 2023-02-20 | End: 2023-02-20

## 2023-02-20 RX ORDER — TRIAMCINOLONE ACETONIDE 40 MG/ML
40 INJECTION, SUSPENSION INTRA-ARTICULAR; INTRAMUSCULAR
Status: COMPLETED | OUTPATIENT
Start: 2023-02-20 | End: 2023-02-20

## 2023-02-20 RX ADMIN — TRIAMCINOLONE ACETONIDE 40 MG: 40 INJECTION, SUSPENSION INTRA-ARTICULAR; INTRAMUSCULAR at 10:07

## 2023-02-20 RX ADMIN — LIDOCAINE HYDROCHLORIDE 2 ML: 10 INJECTION, SOLUTION INFILTRATION; PERINEURAL at 10:07

## 2023-02-20 NOTE — PROGRESS NOTES
Ines Saravia is a 63 y.o. female   Primary provider:  Micheal Morales MD       Chief Complaint   Patient presents with   • Left Shoulder - Pain   • Establish Care       HISTORY OF PRESENT ILLNESS:    Mrs. Saravia is a 63-year-old female who presents today with complaints of left shoulder pain.  Left shoulder pain has been present for the past year.  She denies injury or trauma.  Pain is moderate and intermittent.  Pain is stabbing aching pain.  Pain is associated with occasional popping.  She reports pain with overhead activity and reaching behind her back.  She denies burning, tingling, numbness.  She had recent x-rays which did not show acute bony abnormality.    Also reports left elbow pain which has been present for some time longer than left shoulder pain.  She reports history of elbow fracture many years ago.  No recent trauma or injury.  She reports pain with range of motion.          Pain  This is a chronic problem. The current episode started more than 1 year ago. The problem occurs intermittently. Associated symptoms include abdominal pain and chest pain. Associated symptoms comments: Clicking, stabbing, aching.        CONCURRENT MEDICAL HISTORY:    Past Medical History:   Diagnosis Date   • Abnormal mammogram of right breast     right breast density Spot compression negative 2/3/16      • Benign essential hypertension    • Cancer (HCC)    • Continuous leakage of urine    • Dermatitis    • Diabetes mellitus (HCC)    • Diabetes mellitus screening    • Dysuria    • Elevated cholesterol    • Encounter for gynecological examination with abnormal finding    • Generalized headache    • H/O screening mammography    • Impacted cerumen, bilateral    • Intermittent urinary incontinence    • Localized swelling, mass and lump, head     and neck   • Mitral valve prolapse    • Neck pain    • Neck swelling    • Neuropathy     Followed by Neurology      • Overweight    • Pain    • Patient's noncompliance  "with other medical treatment and regimen    • Radiculopathy, cervical region     Followed by Neurology      • Screening for hyperlipidemia    • Stiffness of joint     not elsewhere classified, involving hand      • Thyroid disorder screening    • Vaginal discharge        Allergies   Allergen Reactions   • Penicillins Other (See Comments)     \"knots\" on skin   • Metronidazole Unknown - Low Severity         Current Outpatient Medications:   •  atorvastatin (LIPITOR) 20 MG tablet, Take 1 tablet by mouth every night at bedtime., Disp: 90 tablet, Rfl: 1  •  COVID-19 At Home Antigen Test (QuickVue At-Home Covid-19 Test) kit, 1 kit by In Vitro route Take As Directed., Disp: 2 kit, Rfl: 0  •  dapagliflozin (FARXIGA) 5 MG tablet tablet, Take 1 tablet by mouth Daily., Disp: 90 tablet, Rfl: 1  •  esomeprazole (nexIUM) 40 MG capsule, Take 1 capsule by mouth once daily, Disp: 90 capsule, Rfl: 0  •  glipizide (Glucotrol) 10 MG tablet, Take 1 tablet by mouth 2 (Two) Times a Day Before Meals., Disp: 180 tablet, Rfl: 1  •  lisinopril (PRINIVIL,ZESTRIL) 20 MG tablet, Take 20 mg by mouth Daily., Disp: , Rfl:   •  lisinopril-hydrochlorothiazide (PRINZIDE,ZESTORETIC) 20-12.5 MG per tablet, Take 1 tablet by mouth Daily., Disp: 90 tablet, Rfl: 1  •  metFORMIN (GLUCOPHAGE) 1000 MG tablet, Take 1 tablet by mouth 2 (Two) Times a Day., Disp: 180 tablet, Rfl: 1  •  metoprolol tartrate (LOPRESSOR) 25 MG tablet, Take 1 tablet by mouth Daily. One the night before and one the morning of.., Disp: 2 tablet, Rfl: 0  •  multivitamin with minerals tablet tablet, Take 1 tablet by mouth Daily., Disp: , Rfl:   •  meloxicam (MOBIC) 15 MG tablet, Take 1 tablet by mouth Daily for 30 days., Disp: 30 tablet, Rfl: 0    Past Surgical History:   Procedure Laterality Date   • COLONOSCOPY N/A 09/28/2021    Procedure: COLONOSCOPY;  Surgeon: Humphrey Campbell MD;  Location: [facility] ENDOSCOPY;  Service: Gastroenterology;  Laterality: N/A;   • COLONOSCOPY N/A 10/04/2022 " "   Procedure: COLONOSCOPY;  Surgeon: Humphrey Campbell MD;  Location: [facility] ENDOSCOPY;  Service: Gastroenterology;  Laterality: N/A;   • ENDOSCOPY N/A 09/28/2021    Procedure: ESOPHAGOGASTRODUODENOSCOPY WITH DILATATION;  Surgeon: Humphrey Campbell MD;  Location: [facility] ENDOSCOPY;  Service: Gastroenterology;  Laterality: N/A;   • ENDOSCOPY N/A 10/04/2022    Procedure: ESOPHAGOGASTRODUODENOSCOPY WITH DILATATION;  Surgeon: Humphrey Campbell MD;  Location: [facility] ENDOSCOPY;  Service: Gastroenterology;  Laterality: N/A;   • FOOT SURGERY Right 12/20/2022    mole removed by Dr. Nation   • OTHER SURGICAL HISTORY  12/19/2015    Remove Impacted Cerumen    • TUBAL ABDOMINAL LIGATION         Family History   Problem Relation Age of Onset   • Diabetes Other    • Hypertension Other    • Heart disease Other    • Hyperlipidemia Other         Social History     Socioeconomic History   • Marital status:    Tobacco Use   • Smoking status: Some Days     Types: Electronic Cigarette   • Smokeless tobacco: Never   Vaping Use   • Vaping Use: Some days   • Substances: Nicotine, Flavoring   • Devices: Refillable tank   Substance and Sexual Activity   • Alcohol use: No   • Drug use: No   • Sexual activity: Defer        Review of Systems   Constitutional: Positive for unexpected weight change.        Night sweats   Eyes: Positive for visual disturbance.   Cardiovascular: Positive for chest pain and palpitations.   Gastrointestinal: Positive for abdominal pain and diarrhea.   All other systems reviewed and are negative.      PHYSICAL EXAMINATION:       Ht 162.6 cm (64\")   Wt 90.7 kg (200 lb)   BMI 34.33 kg/m²     Physical Exam  Vitals and nursing note reviewed.   Constitutional:       General: She is not in acute distress.     Appearance: She is well-developed. She is not toxic-appearing.   HENT:      Head: Normocephalic.   Pulmonary:      Effort: Pulmonary effort is normal. No respiratory distress.   Skin:     General: Skin is warm and " dry.   Neurological:      Mental Status: She is alert and oriented to person, place, and time.   Psychiatric:         Behavior: Behavior normal.         Thought Content: Thought content normal.         Judgment: Judgment normal.         GAIT:     [x]  Normal  []  Antalgic    Assistive device: [x]  None  []  Walker     []  Crutches  []  Cane     []  Wheelchair  []  Stretcher    Left Elbow Exam     Tenderness   Left elbow tenderness location: Diffuse.     Range of Motion   The patient has normal left elbow ROM.    Other   Erythema: absent  Sensation: normal  Pulse: present    Comments:  No pain with lifting palm down or wrist extension.  Mild pain with lifting palm up, however this is for diffuse elbow and not medial epicondyle.  No tenderness at either condyles.      Left Shoulder Exam     Tenderness   The patient is experiencing tenderness in the acromion.    Range of Motion   Active abduction: 150   Extension: 50   External rotation: 80   Forward flexion: 160   Internal rotation 90 degrees: 90     Tests   Impingement: positive    Other   Erythema: absent  Sensation: normal  Pulse: present     Comments:  Negative full can test.  Positive empty can test.              No results found.    PROCEDURE: XR SHOULDER 2+ VW LEFT     VIEWS: 3     INDICATION: Pain     COMPARISON: None     FINDINGS:     - fracture: None    - alignment: Within normal limits    - misc: No significant soft tissue abnormality           IMPRESSION:  No acute or significant abnormality identified.        Note:  if pain or symptoms persist beyond reasonable expectations     and follow-up imaging is anticipated,  cross sectional imaging   (CT and/or MRI) is suggested, as is deemed clinically   appropriate.     Electronically signed by:  Glenis Raya MD  12/30/2022 2:33 PM  CST Workstation: [id number]    ASSESSMENT:    Diagnoses and all orders for this visit:    Chronic left shoulder pain    Chronic elbow pain, left    Impingement syndrome of left  shoulder    Internal derangement of left shoulder    Other orders  -     Large Joint Arthrocentesis: L subacromial bursa  -     meloxicam (MOBIC) 15 MG tablet; Take 1 tablet by mouth Daily for 30 days.          PLAN    Large Joint Arthrocentesis: L subacromial bursa  Date/Time: 2/20/2023 10:07 AM  Consent given by: patient  Site marked: site marked  Timeout: Immediately prior to procedure a time out was called to verify the correct patient, procedure, equipment, support staff and site/side marked as required   Supporting Documentation  Indications: pain   Procedure Details  Location: shoulder - L subacromial bursa  Preparation: Patient was prepped and draped in the usual sterile fashion  Needle size: 22 G  Approach: posterior  Medications administered: 40 mg triamcinolone acetonide 40 MG/ML; 2 mL lidocaine 1 %  Patient tolerance: patient tolerated the procedure well with no immediate complications            X-rays of shoulder reviewed, no acute bony abnormality identified.  Patient has pain with empty can test and impingement test to suggest possible rotator cuff involvement.  After discussing available treatment options including oral steroids, NSAIDs, injectable steroids, physical therapy etc. patient desires to proceed with localized injection and prescription strength NSAID today.  Signs and symptoms to report how to take medication properly explained.  Risk, benefits, alternatives of meloxicam and localized injection explained.  Patient verbalized understanding and is to return in 4 to 6 weeks.  If shoulder is not improving with above-mentioned interventions, we may consider formal physical therapy or MRI as indicated.      Patient is not, scheduled to be seen for elbow pain today, however patient's clinical history and exam seem most consistent with likely arthritis.  Patient will try meloxicam prescribed for her shoulder to see if this will relieve elbow pain as well.  If meloxicam does not help with elbow  pain, patient is instructed to return for dedicated x-rays of elbow.  We did discuss possible localized injection for elbow as well especially if arthritis is present.  Signs and symptoms to report including when to seek care explained.  Patient verbalized understanding.    Return in about 4 weeks (around 3/20/2023), or if symptoms worsen or fail to improve.    EMR Dragon/Transciption Disclaimer: Some of this note may be an electronic transcription/translation of spoken language to printed text.  The electronic translation of spoken language may permit erroneous, or at times, nonsensical words or phrases to be inadvertently transcribed. Although I have reviewed the note for such errors, some may still exist.       This document has been electronically signed by Rebecca RAWLS on February 20, 2023 10:23 CST

## 2023-03-07 ENCOUNTER — TRANSCRIBE ORDERS (OUTPATIENT)
Dept: CT IMAGING | Facility: HOSPITAL | Age: 63
End: 2023-03-07
Payer: OTHER GOVERNMENT

## 2023-03-07 ENCOUNTER — OFFICE VISIT (OUTPATIENT)
Dept: GASTROENTEROLOGY | Facility: CLINIC | Age: 63
End: 2023-03-07
Payer: OTHER GOVERNMENT

## 2023-03-07 VITALS
HEIGHT: 64 IN | BODY MASS INDEX: 32.95 KG/M2 | SYSTOLIC BLOOD PRESSURE: 140 MMHG | DIASTOLIC BLOOD PRESSURE: 94 MMHG | WEIGHT: 193 LBS | HEART RATE: 78 BPM

## 2023-03-07 DIAGNOSIS — K76.0 FATTY LIVER: ICD-10-CM

## 2023-03-07 DIAGNOSIS — K21.00 GASTROESOPHAGEAL REFLUX DISEASE WITH ESOPHAGITIS WITHOUT HEMORRHAGE: ICD-10-CM

## 2023-03-07 DIAGNOSIS — R00.2 PALPITATIONS: Primary | ICD-10-CM

## 2023-03-07 DIAGNOSIS — R13.10 DYSPHAGIA, UNSPECIFIED TYPE: ICD-10-CM

## 2023-03-07 DIAGNOSIS — R10.10 PAIN OF UPPER ABDOMEN: Primary | ICD-10-CM

## 2023-03-07 PROCEDURE — 99214 OFFICE O/P EST MOD 30 MIN: CPT | Performed by: PHYSICIAN ASSISTANT

## 2023-03-07 RX ORDER — DEXTROSE AND SODIUM CHLORIDE 5; .45 G/100ML; G/100ML
30 INJECTION, SOLUTION INTRAVENOUS CONTINUOUS PRN
OUTPATIENT
Start: 2023-03-07

## 2023-03-07 NOTE — PROGRESS NOTES
Chief Complaint   Patient presents with   • Heartburn   • Fatty Liver     2 month follow up   • Difficulty Swallowing   • Nausea              ENDO PROCEDURE ORDERED: EGDw/dil, dysphagia, gerd    Subjective    Ines Saravia is a 63 y.o. female. she is here today for follow-up.    History of Present Illness    Patient seen on a recheck of her throat pain, nausea, dysphagia, iron deficiency. :Last seen 12/27/2022. Patient states she is having increasing dysphagia to solids, is complaining of 8 out of 10 throat pain and a lot of nausea despite taking the Nexium. Bowels are moving without blood. She states she was taking the GoLo for weight loss but feels like it really bothered her stomach. Weight is down 7 pounds since her last visit. Last EGD/colonoscopy 10/04/2022 showed esophageal stricture, gastritis and hyperplastic polyp.     Laboratory 12/29/2022 [facility] FibroSure 0.20/F0, steatosis 0.59/S2, 0.50/N1. Cholesterol 259, glucose 126, INR 0.96, TSH normal, A1c 6.8%.     A/P: Patient with nausea, increasing dysphagia suspect recurrent peptic stricture. Recommend EGD with dilatation, will do biopsies as indicated. She was encouraged to continue dietary modification and weight loss attempts. Last LFT's were normal. Will plan follow-up after the above, further pending clinical course and the results of the above.         The following portions of the patient's history were reviewed and updated as appropriate:   Past Medical History:   Diagnosis Date   • Abnormal mammogram of right breast     right breast density Spot compression negative 2/3/16      • Benign essential hypertension    • Cancer (HCC)    • Continuous leakage of urine    • Dermatitis    • Diabetes mellitus (HCC)    • Diabetes mellitus screening    • Dysuria    • Elevated cholesterol    • Encounter for gynecological examination with abnormal finding    • Generalized headache    • H/O screening mammography    • Impacted cerumen, bilateral    •  "Intermittent urinary incontinence    • Localized swelling, mass and lump, head     and neck   • Mitral valve prolapse    • Neck pain    • Neck swelling    • Neuropathy     Followed by Neurology      • Overweight    • Pain    • Patient's noncompliance with other medical treatment and regimen    • Radiculopathy, cervical region     Followed by Neurology      • Screening for hyperlipidemia    • Stiffness of joint     not elsewhere classified, involving hand      • Thyroid disorder screening    • Vaginal discharge      Past Surgical History:   Procedure Laterality Date   • COLONOSCOPY N/A 09/28/2021    Procedure: COLONOSCOPY;  Surgeon: Humphrey Campbell MD;  Location: [facility] ENDOSCOPY;  Service: Gastroenterology;  Laterality: N/A;   • COLONOSCOPY N/A 10/04/2022    Procedure: COLONOSCOPY;  Surgeon: Humphrey Campbell MD;  Location: [facility] ENDOSCOPY;  Service: Gastroenterology;  Laterality: N/A;   • ENDOSCOPY N/A 09/28/2021    Procedure: ESOPHAGOGASTRODUODENOSCOPY WITH DILATATION;  Surgeon: Humphrey Campbell MD;  Location: [facility] ENDOSCOPY;  Service: Gastroenterology;  Laterality: N/A;   • ENDOSCOPY N/A 10/04/2022    Procedure: ESOPHAGOGASTRODUODENOSCOPY WITH DILATATION;  Surgeon: Humphrey Campbell MD;  Location: [facility] ENDOSCOPY;  Service: Gastroenterology;  Laterality: N/A;   • FOOT SURGERY Right 12/20/2022    mole removed by Dr. Nation   • OTHER SURGICAL HISTORY  12/19/2015    Remove Impacted Cerumen    • TUBAL ABDOMINAL LIGATION       Family History   Problem Relation Age of Onset   • Diabetes Other    • Hypertension Other    • Heart disease Other    • Hyperlipidemia Other      OB History    No obstetric history on file.       Allergies   Allergen Reactions   • Penicillins Other (See Comments)     \"knots\" on skin   • Metronidazole Unknown - Low Severity     Social History     Socioeconomic History   • Marital status:    Tobacco Use   • Smoking status: Some Days     Types: Electronic Cigarette   • Smokeless tobacco: " Never   Vaping Use   • Vaping Use: Some days   • Substances: Nicotine, Flavoring   • Devices: Refillable tank   Substance and Sexual Activity   • Alcohol use: No   • Drug use: No   • Sexual activity: Defer     Current Medications:  Prior to Admission medications    Medication Sig Start Date End Date Taking? Authorizing Provider   atorvastatin (LIPITOR) 20 MG tablet Take 1 tablet by mouth every night at bedtime. 12/29/22  Yes Micheal Morales MD   COVID-19 At Home Antigen Test ([employer]ue At-Home Covid-19 Test) kit 1 kit by In Vitro route Take As Directed. 1/5/23  Yes Pete Cantu APRN   dapagliflozin (FARXIGA) 5 MG tablet tablet Take 1 tablet by mouth Daily. 12/29/22  Yes Micheal Morales MD   esomeprazole (nexIUM) 40 MG capsule Take 1 capsule by mouth once daily 12/20/22  Yes Jens Mustafa PA-C   glipizide (Glucotrol) 10 MG tablet Take 1 tablet by mouth 2 (Two) Times a Day Before Meals. 12/29/22  Yes Micheal Morales MD   lisinopril (PRINIVIL,ZESTRIL) 20 MG tablet Take 1 tablet by mouth Daily.   Yes ProviderPete MD   lisinopril-hydrochlorothiazide (PRINZIDE,ZESTORETIC) 20-12.5 MG per tablet Take 1 tablet by mouth Daily. 12/29/22  Yes Micheal Morales MD   meloxicam (MOBIC) 15 MG tablet Take 1 tablet by mouth Daily for 30 days. 2/20/23 3/22/23 Yes Rebecca Courtney APRN   metFORMIN (GLUCOPHAGE) 1000 MG tablet Take 1 tablet by mouth 2 (Two) Times a Day. 12/29/22  Yes Micheal Morales MD   metoprolol tartrate (LOPRESSOR) 25 MG tablet Take 1 tablet by mouth Daily. One the night before and one the morning of.. 2/16/23  Yes Roxie Raza MD   multivitamin with minerals tablet tablet Take 1 tablet by mouth Daily.   Yes Provider, MD Pete     Review of Systems  Review of Systems   HENT: Positive for trouble swallowing.    Gastrointestinal: Positive for abdominal pain and nausea. Negative for constipation and diarrhea.          Objective    /94   Pulse 78   Ht  "162.6 cm (64\")   Wt 87.5 kg (193 lb)   BMI 33.13 kg/m²   Physical Exam  Vitals and nursing note reviewed.   Constitutional:       General: She is not in acute distress.     Appearance: She is well-developed. She is obese.      Comments: RADHA   HENT:      Head: Normocephalic and atraumatic.   Eyes:      Pupils: Pupils are equal, round, and reactive to light.   Cardiovascular:      Rate and Rhythm: Normal rate and regular rhythm.      Heart sounds: Normal heart sounds.   Pulmonary:      Effort: Pulmonary effort is normal.      Breath sounds: Normal breath sounds.   Abdominal:      General: Bowel sounds are normal. There is no distension or abdominal bruit.      Palpations: Abdomen is soft. Abdomen is not rigid. There is no shifting dullness or mass.      Tenderness: There is abdominal tenderness. There is no guarding or rebound.      Hernia: No hernia is present. There is no hernia in the ventral area.   Musculoskeletal:         General: Normal range of motion.      Cervical back: Normal range of motion.   Skin:     General: Skin is warm and dry.   Neurological:      Mental Status: She is alert and oriented to person, place, and time.   Psychiatric:         Behavior: Behavior normal.         Thought Content: Thought content normal.         Judgment: Judgment normal.       Assessment & Plan      1. Pain of upper abdomen    2. Dysphagia, unspecified type    3. Gastroesophageal reflux disease with esophagitis without hemorrhage    4. Fatty liver    .   Diagnoses and all orders for this visit:    1. Pain of upper abdomen (Primary)  -     Case Request; Standing  -     dextrose 5 % and sodium chloride 0.45 % infusion  -     Case Request    2. Dysphagia, unspecified type  -     Case Request; Standing  -     dextrose 5 % and sodium chloride 0.45 % infusion  -     Case Request    3. Gastroesophageal reflux disease with esophagitis without hemorrhage  -     Case Request; Standing  -     dextrose 5 % and sodium chloride 0.45 % " infusion  -     Case Request    4. Fatty liver    Other orders  -     Follow Anesthesia Guidelines / Protocol; Future  -     Obtain Informed Consent; Future  -     Obtain Informed Consent; Standing  -     POC Glucose Once; Standing        Orders placed during this encounter include:  Orders Placed This Encounter   Procedures   • Obtain Informed Consent     Standing Status:   Future     Order Specific Question:   Informed Consent Given For     Answer:   ESOPHAGOGASTRODUODENOSCOPY WITH DILATATION       Medications prescribed:  No orders of the defined types were placed in this encounter.      Requested Prescriptions      No prescriptions requested or ordered in this encounter       Review and/or summary of lab tests, radiology, procedures, medications. Review and summary of old records and obtaining of history. The risks and benefits of my recommendations, as well as other treatment options were discussed with the patient today. Questions were answered.    Follow-up: Return for After the above.     ESOPHAGOGASTRODUODENOSCOPY WITH DILATATION (N/A)      This document has been electronically signed by Jens Mustafa PA-C on March 21, 2023 18:32 CDT      Results for orders placed or performed in visit on 03/14/23   BUN    Specimen: Blood   Result Value Ref Range    BUN 17 8 - 23 mg/dL   Creatinine Serum (kidney function) GFR component    Specimen: Blood   Result Value Ref Range    Creatinine 0.64 0.57 - 1.00 mg/dL    eGFR 98.8 >60.0 mL/min/1.73   Results for orders placed or performed in visit on 03/13/23   POC Glycated Hemoglobin, Total    Specimen: Blood   Result Value Ref Range    Hemoglobin A1C 5.7 %    Lot Number [id number]     Expiration Date 6,690,644    Results for orders placed or performed in visit on 02/16/23   ECG 12 Lead   Result Value Ref Range    QT Interval 428 ms    QTC Interval 468 ms   Results for orders placed or performed in visit on 01/05/23   POCT SARS-CoV-2 Antigen CHRIS    Specimen: Swab   Result  Value Ref Range    SARS Antigen Not Detected Not Detected, Presumptive Negative    Internal Control Passed Passed    Lot Number [id number]     Expiration Date 06/04/2023    Results for orders placed or performed in visit on 12/29/22   [facility]CKENNA Fibrosure    Specimen: Blood   Result Value Ref Range    Fibrosis Score (References) 0.02 0.00 - 0.21    Fibrosis Stage (Reference) Comment     Steatosis Score (Reference) 0.59 (H) 0.00 - 0.30    Steatosis Grade (Reference) Comment     [facility]NA Score (Reference) 0.50 (H) 0.25    Mckenna Grade (Reference) Comment     Height: (Reference) 64 in    Weight: (Reference) 195 LBS    Alpha 2-Macroglobulins, Qn 123 110 - 276 mg/dL    Haptoglobin 170 37 - 355 mg/dL    Apolipoprotein A-1 205 116 - 209 mg/dL    Total Bilirubin 0.1 0.0 - 1.2 mg/dL    GGT 33 0 - 60 IU/L    ALT (SGPT) 20 0 - 40 IU/L    AST (SGOT) P5P (Reference) 20 0 - 40 IU/L    Cholesterol, Total (Reference) 259 (H) 100 - 199 mg/dL    Glucose, Serum (Reference) 126 (H) 70 - 99 mg/dL    Triglycerides 115 0 - 149 mg/dL    Interpretations: (Reference) Comment     Fibrosis Scoring: Comment     Steatosis Grading (Reference) Comment     Mckenna Scoring (Reference) Comment     Limitations: (Reference) Comment     Comment (Reference) Comment    Protime-INR    Specimen: Blood   Result Value Ref Range    Protime 12.7 11.1 - 15.3 Seconds    INR 0.96 0.80 - 1.20   TSH    Specimen: Blood   Result Value Ref Range    TSH 0.466 0.270 - 4.200 uIU/mL   Results for orders placed or performed in visit on 12/29/22   POC Glycated Hemoglobin, Total    Specimen: Blood   Result Value Ref Range    Hemoglobin A1C 6.8 %    Lot Number [id number]     Expiration Date 10,312,024    Results for orders placed or performed during the hospital encounter of 10/04/22   TISSUE EXAM, [facility] ([facility])    Specimen: A: Gastric, Antrum; Tissue    B: Large Intestine, Left / Descending Colon; Tissue    C: Large Intestine; Tissue    D: Large Intestine, Sigmoid Colon; Tissue   Result  Value Ref Range    Reference Lab Report       [facility]  [address] [place] [address]  Phone: [phone] or [phone]  Fax: [phone]  Jose Miguel Alfaro M.D., Medical Director    PATIENT NAME                           LABORATORY NO.  [id number]  GEN[address]OWNE.            [id number]  [id number]                         AGE              SEX  SSN           CLIENT REF #  [facility]           62      1960  F    [id number]   [id number]    [place]                       REQUESTING M.D.     ATTENDING M.D.     COPY TO.  [address] [place] [address]                 [place] [address]             DATE COLLECTED      DATE RECEIVED      DATE REPORTED  10/04/2022          10/04/2022         10/05/2022    DIAGNOSIS:  A.   GASTRIC ANTRUM, BIOPSY:  Mild reactive (chemical) gastropathy  Negative for intestinal metaplasia, significant active inflammation,  neoplasia, malignancy  No Helicobacter-like organisms identified on H and E    B.    DESCENDING COLON POLYP:  Small hyperplastic polyp  Negative for malignancy    C.   COLON, BIOPSY:  Benign colonic mucosa, negative for significant architectural distortion,  inflammatory infiltrate, neoplasia, malignancy    D.   SIGMOID COLON POLYP:  Hyperplastic polyp  Negative for malignancy    CLINICAL HISTORY:  Diverticulitis, other iron deficiency anemia, dysphagia, unspecified type, pain of  upper abdomen, left lower quadrant abdominal pain    SPECIMENS RECEIVED:  A.  GASTRIC ANTRUM, BIOPSY  B.  DESCENDING COLON POLYP  C.  COLON, BIOPSY  D.  SIGMOID COLON POLYP    MICROSCOPIC DESCRIPTION:  Tissue blocks are prepared and slides are examined microscopically on all  specimens. See diagnosis for details.    Professional interpretation rendered by Cris Loepz M.D., F.C.A.P. at  [facility] [employer], [address] [place] [address].    GROSS DESCRIPTION:  A.  Specimen is received in 1  "formalin filled container labeled \"gastric,  antrum\" and consists of 3 pieces of tan soft tissue  measuring 0.5 x 0.3 x 0.2  cm in aggregate.  The specimen is submitted entirely in 1 cassette.  CECI  B.  Specimen is received in 1 formalin filled container labeled \"large intestine,  left/descending colon\" and consists of 1 piece of tan soft tissue measuring  0.4 x 0.2 x 0.2 cm.  The specimen is submitted entirely in 1 cassette.  C.  Specimen is received in 1 formalin filled container labeled \"colonic  mucosa biopsy\" and consists of 2 pieces of tan soft tissue measuring 0.6 x  0.3 x 0.2 cm in aggregate.  The specimen is submitted entirely in 1  cassette.  D.  Specimen is received in 1 formalin filled container labeled \"large intestine,  sigmoid colon\" and consists of 2 pieces of tan soft tissue measuring 0.4 x  0.4 x 0.2 cm in aggregate.  Specimen is submitted entirely in 1 cassette.    REVIEWED, DIAGNOSED AND ELECTRONICALLY  SIGNED BY:    Cris Lopez M.D., F.C.A.P.  CPT CODES:  88305x4     POC Glucose Once    Specimen: Blood   Result Value Ref Range    Glucose 136 (H) 70 - 130 mg/dL   Results for orders placed or performed in visit on 08/18/22   Vitamin B12 and Folate    Specimen: Blood   Result Value Ref Range    Folate 18.00 4.78 - 24.20 ng/mL    Vitamin B-12 818 211 - 946 pg/mL   CBC Auto Differential    Specimen: Blood   Result Value Ref Range    WBC 5.57 3.40 - 10.80 10*3/mm3    RBC 4.05 3.77 - 5.28 10*6/mm3    Hemoglobin 11.3 (L) 12.0 - 15.9 g/dL    Hematocrit 35.2 34.0 - 46.6 %    MCV 86.9 79.0 - 97.0 fL    MCH 27.9 26.6 - 33.0 pg    MCHC 32.1 31.5 - 35.7 g/dL    RDW 12.9 12.3 - 15.4 %    RDW-SD 39.9 37.0 - 54.0 fl    MPV 10.3 6.0 - 12.0 fL    Platelets 369 140 - 450 10*3/mm3    Neutrophil % 54.8 42.7 - 76.0 %    Lymphocyte % 34.1 19.6 - 45.3 %    Monocyte % 9.0 5.0 - 12.0 %    Eosinophil % 1.4 0.3 - 6.2 %    Basophil % 0.5 0.0 - 1.5 %    Immature Grans % 0.2 0.0 - 0.5 %    Neutrophils, Absolute 3.05 1.70 " - 7.00 10*3/mm3    Lymphocytes, Absolute 1.90 0.70 - 3.10 10*3/mm3    Monocytes, Absolute 0.50 0.10 - 0.90 10*3/mm3    Eosinophils, Absolute 0.08 0.00 - 0.40 10*3/mm3    Basophils, Absolute 0.03 0.00 - 0.20 10*3/mm3    Immature Grans, Absolute 0.01 0.00 - 0.05 10*3/mm3    nRBC 0.0 0.0 - 0.2 /100 WBC   Reticulocytes    Specimen: Blood   Result Value Ref Range    Reticulocyte % 1.54 0.70 - 1.90 %    Reticulocyte Absolute 0.0624 0.0200 - 0.1300 10*6/mm3   Iron    Specimen: Blood   Result Value Ref Range    Iron 62 37 - 145 mcg/dL   Ferritin    Specimen: Blood   Result Value Ref Range    Ferritin 314.00 (H) 13.00 - 150.00 ng/mL   Results for orders placed or performed in visit on 07/27/22   Urinalysis, Microscopic Only - Urine, Clean Catch    Specimen: Urine, Clean Catch   Result Value Ref Range    RBC, UA 0-2 None Seen, 0-2 /HPF    WBC, UA 0-2 None Seen, 0-2 /HPF    Bacteria, UA None Seen None Seen /HPF    Squamous Epithelial Cells, UA 0-2 None Seen, 0-2 /HPF    Hyaline Casts, UA 3-6 None Seen /LPF    Calcium Oxalate Crystals, UA Moderate/2+ None Seen /HPF    Methodology Manual Light Microscopy    Urinalysis With Culture If Indicated - Urine, Clean Catch    Specimen: Urine, Clean Catch   Result Value Ref Range    Color, UA Dark Yellow (A) Yellow, Straw    Appearance, UA Cloudy (A) Clear    pH, UA 6.0 5.0 - 8.0    Specific Gravity, UA >=1.030 1.005 - 1.030    Glucose, UA >=1000 mg/dL (3+) (A) Negative    Ketones, UA Trace (A) Negative    Bilirubin, UA Negative Negative    Blood, UA Negative Negative    Protein, UA 30 mg/dL (1+) (A) Negative    Leuk Esterase, UA Negative Negative    Nitrite, UA Negative Negative    Urobilinogen, UA 0.2 E.U./dL 0.2 - 1.0 E.U./dL   CBC Auto Differential    Specimen: Blood   Result Value Ref Range    WBC 7.54 3.40 - 10.80 10*3/mm3    RBC 4.22 3.77 - 5.28 10*6/mm3    Hemoglobin 11.7 (L) 12.0 - 15.9 g/dL    Hematocrit 36.7 34.0 - 46.6 %    MCV 87.0 79.0 - 97.0 fL    MCH 27.7 26.6 - 33.0 pg     MCHC 31.9 31.5 - 35.7 g/dL    RDW 13.2 12.3 - 15.4 %    RDW-SD 40.9 37.0 - 54.0 fl    MPV 9.7 6.0 - 12.0 fL    Platelets 461 (H) 140 - 450 10*3/mm3    Neutrophil % 57.5 42.7 - 76.0 %    Lymphocyte % 31.6 19.6 - 45.3 %    Monocyte % 8.9 5.0 - 12.0 %    Eosinophil % 1.2 0.3 - 6.2 %    Basophil % 0.5 0.0 - 1.5 %    Immature Grans % 0.3 0.0 - 0.5 %    Neutrophils, Absolute 4.34 1.70 - 7.00 10*3/mm3    Lymphocytes, Absolute 2.38 0.70 - 3.10 10*3/mm3    Monocytes, Absolute 0.67 0.10 - 0.90 10*3/mm3    Eosinophils, Absolute 0.09 0.00 - 0.40 10*3/mm3    Basophils, Absolute 0.04 0.00 - 0.20 10*3/mm3    Immature Grans, Absolute 0.02 0.00 - 0.05 10*3/mm3    nRBC 0.0 0.0 - 0.2 /100 WBC     *Note: Due to a large number of results and/or encounters for the requested time period, some results have not been displayed. A complete set of results can be found in Results Review.

## 2023-03-13 ENCOUNTER — OFFICE VISIT (OUTPATIENT)
Dept: FAMILY MEDICINE CLINIC | Facility: CLINIC | Age: 63
End: 2023-03-13
Payer: OTHER GOVERNMENT

## 2023-03-13 VITALS
TEMPERATURE: 97.3 F | HEIGHT: 64 IN | DIASTOLIC BLOOD PRESSURE: 80 MMHG | SYSTOLIC BLOOD PRESSURE: 132 MMHG | HEART RATE: 87 BPM | WEIGHT: 193.6 LBS | BODY MASS INDEX: 33.05 KG/M2 | OXYGEN SATURATION: 96 %

## 2023-03-13 DIAGNOSIS — H61.22 IMPACTED CERUMEN OF LEFT EAR: ICD-10-CM

## 2023-03-13 DIAGNOSIS — R42 DIZZINESS: ICD-10-CM

## 2023-03-13 DIAGNOSIS — H92.02 EARACHE ON LEFT: ICD-10-CM

## 2023-03-13 DIAGNOSIS — E11.65 TYPE 2 DIABETES MELLITUS WITH HYPERGLYCEMIA, WITHOUT LONG-TERM CURRENT USE OF INSULIN: Chronic | ICD-10-CM

## 2023-03-13 LAB
EXPIRATION DATE: ABNORMAL
HBA1C MFR BLD: 5.7 %
Lab: ABNORMAL

## 2023-03-13 PROCEDURE — 83036 HEMOGLOBIN GLYCOSYLATED A1C: CPT | Performed by: FAMILY MEDICINE

## 2023-03-13 PROCEDURE — 69209 REMOVE IMPACTED EAR WAX UNI: CPT | Performed by: FAMILY MEDICINE

## 2023-03-13 PROCEDURE — 36416 COLLJ CAPILLARY BLOOD SPEC: CPT | Performed by: FAMILY MEDICINE

## 2023-03-13 PROCEDURE — 99213 OFFICE O/P EST LOW 20 MIN: CPT | Performed by: FAMILY MEDICINE

## 2023-03-13 RX ORDER — AZITHROMYCIN 250 MG/1
TABLET, FILM COATED ORAL
Qty: 6 TABLET | Refills: 0 | Status: SHIPPED | OUTPATIENT
Start: 2023-03-13 | End: 2023-04-06
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
## 2023-03-14 ENCOUNTER — HOSPITAL ENCOUNTER (OUTPATIENT)
Dept: CT IMAGING | Facility: HOSPITAL | Age: 63
Discharge: HOME | End: 2023-03-14
Payer: OTHER GOVERNMENT

## 2023-03-14 ENCOUNTER — LAB (OUTPATIENT)
Dept: LAB | Facility: HOSPITAL | Age: 63
End: 2023-03-14
Payer: OTHER GOVERNMENT

## 2023-03-14 DIAGNOSIS — R00.2 PALPITATIONS: ICD-10-CM

## 2023-03-14 DIAGNOSIS — E78.2 MIXED HYPERLIPIDEMIA: ICD-10-CM

## 2023-03-14 DIAGNOSIS — R07.2 PRECORDIAL PAIN: ICD-10-CM

## 2023-03-14 DIAGNOSIS — I10 ESSENTIAL HYPERTENSION: ICD-10-CM

## 2023-03-14 DIAGNOSIS — E11.65 TYPE 2 DIABETES MELLITUS WITH HYPERGLYCEMIA, WITHOUT LONG-TERM CURRENT USE OF INSULIN: Chronic | ICD-10-CM

## 2023-03-14 LAB
BUN SERPL-MCNC: 17 MG/DL (ref 8–23)
CREAT SERPL-MCNC: 0.64 MG/DL (ref 0.57–1)
EGFRCR SERPLBLD CKD-EPI 2021: 98.8 ML/MIN/1.73

## 2023-03-14 PROCEDURE — 75574 CT ANGIO HRT W/3D IMAGE: CPT

## 2023-03-14 PROCEDURE — 36415 COLL VENOUS BLD VENIPUNCTURE: CPT

## 2023-03-14 PROCEDURE — 84520 ASSAY OF UREA NITROGEN: CPT

## 2023-03-14 PROCEDURE — 82565 ASSAY OF CREATININE: CPT

## 2023-03-14 PROCEDURE — 25510000001 IOPAMIDOL PER 1 ML: Performed by: INTERNAL MEDICINE

## 2023-03-14 RX ORDER — SODIUM CHLORIDE 9 MG/ML
100 INJECTION, SOLUTION INTRAVENOUS
Status: COMPLETED | OUTPATIENT
Start: 2023-03-14 | End: 2023-03-14

## 2023-03-14 RX ADMIN — IOPAMIDOL 90 ML: 755 INJECTION, SOLUTION INTRAVENOUS at 08:35

## 2023-03-14 RX ADMIN — SODIUM CHLORIDE 75 ML: 9 INJECTION, SOLUTION INTRAVENOUS at 08:37

## 2023-03-24 RX ORDER — ESOMEPRAZOLE MAGNESIUM 40 MG/1
CAPSULE, DELAYED RELEASE ORAL
Qty: 90 CAPSULE | Refills: 0 | Status: SHIPPED | OUTPATIENT
Start: 2023-03-24

## 2023-04-06 ENCOUNTER — OFFICE VISIT (OUTPATIENT)
Dept: FAMILY MEDICINE CLINIC | Facility: CLINIC | Age: 63
End: 2023-04-06
Payer: OTHER GOVERNMENT

## 2023-04-06 VITALS
HEART RATE: 97 BPM | SYSTOLIC BLOOD PRESSURE: 110 MMHG | BODY MASS INDEX: 33.02 KG/M2 | OXYGEN SATURATION: 94 % | WEIGHT: 193.4 LBS | DIASTOLIC BLOOD PRESSURE: 70 MMHG | HEIGHT: 64 IN | TEMPERATURE: 97.5 F

## 2023-04-06 DIAGNOSIS — R07.1 CHEST PAIN ON BREATHING: ICD-10-CM

## 2023-04-06 DIAGNOSIS — R07.81 RIB PAIN ON LEFT SIDE: ICD-10-CM

## 2023-04-06 RX ORDER — KETOROLAC TROMETHAMINE 30 MG/ML
30 INJECTION, SOLUTION INTRAMUSCULAR; INTRAVENOUS ONCE
Status: COMPLETED | OUTPATIENT
Start: 2023-04-06 | End: 2023-04-06

## 2023-04-06 RX ADMIN — KETOROLAC TROMETHAMINE 30 MG: 30 INJECTION, SOLUTION INTRAMUSCULAR; INTRAVENOUS at 14:52

## 2023-04-06 NOTE — PROGRESS NOTES
Injection  Injection performed in left upper outer quadrant by Nickie Oconnor MA. Patient tolerated the procedure well without complications.  04/06/23   Nickie Oconnor MA
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
## 2023-04-19 ENCOUNTER — OFFICE VISIT (OUTPATIENT)
Dept: FAMILY MEDICINE CLINIC | Facility: CLINIC | Age: 63
End: 2023-04-19
Payer: OTHER GOVERNMENT

## 2023-04-19 VITALS
DIASTOLIC BLOOD PRESSURE: 82 MMHG | WEIGHT: 196 LBS | HEART RATE: 81 BPM | BODY MASS INDEX: 33.46 KG/M2 | HEIGHT: 64 IN | TEMPERATURE: 97.3 F | OXYGEN SATURATION: 96 % | SYSTOLIC BLOOD PRESSURE: 122 MMHG

## 2023-04-19 DIAGNOSIS — R07.81 RIB PAIN ON LEFT SIDE: ICD-10-CM

## 2023-04-19 DIAGNOSIS — R07.82 INTERCOSTAL PAIN: ICD-10-CM

## 2023-04-19 DIAGNOSIS — E11.69 TYPE 2 DIABETES MELLITUS WITH OTHER SPECIFIED COMPLICATION, WITHOUT LONG-TERM CURRENT USE OF INSULIN: Chronic | ICD-10-CM

## 2023-04-19 DIAGNOSIS — I10 ESSENTIAL HYPERTENSION: Chronic | ICD-10-CM

## 2023-04-19 DIAGNOSIS — E78.49 OTHER HYPERLIPIDEMIA: Chronic | ICD-10-CM

## 2023-04-19 PROBLEM — E11.9 DIABETES MELLITUS: Chronic | Status: ACTIVE | Noted: 2023-04-19

## 2023-04-19 RX ORDER — LISINOPRIL AND HYDROCHLOROTHIAZIDE 20; 12.5 MG/1; MG/1
1 TABLET ORAL DAILY
Qty: 90 TABLET | Refills: 1 | Status: SHIPPED | OUTPATIENT
Start: 2023-04-19

## 2023-04-19 RX ORDER — ATORVASTATIN CALCIUM 20 MG/1
20 TABLET, FILM COATED ORAL
Qty: 90 TABLET | Refills: 1 | Status: SHIPPED | OUTPATIENT
Start: 2023-04-19
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
## 2023-04-21 ENCOUNTER — TELEPHONE (OUTPATIENT)
Dept: FAMILY MEDICINE CLINIC | Facility: CLINIC | Age: 63
End: 2023-04-21
Payer: OTHER GOVERNMENT

## 2023-04-21 NOTE — TELEPHONE ENCOUNTER
Spoke with pharmacy to be sure prescriptions were received that were sent on 4/19/23. Did receive. Waiting for pt to . Others were too soon to fill at this time.    Returned call to pt. She had heard back from the pharmacy letting her know her prescriptions had been received and they would get them ready for her.

## 2023-04-21 NOTE — TELEPHONE ENCOUNTER
Patient would like a call back in regards to medications. Patient stated she needed refills on all her medications, but the pharmacy didn't have all the scripts on file to fill.  Call back number: [phone]

## 2023-06-14 ENCOUNTER — ANESTHESIA (OUTPATIENT)
Dept: GASTROENTEROLOGY | Facility: HOSPITAL | Age: 63
End: 2023-06-14
Payer: OTHER GOVERNMENT

## 2023-06-14 ENCOUNTER — HOSPITAL ENCOUNTER (OUTPATIENT)
Facility: HOSPITAL | Age: 63
Setting detail: HOSPITAL OUTPATIENT SURGERY
Discharge: HOME | End: 2023-06-14
Attending: INTERNAL MEDICINE | Admitting: INTERNAL MEDICINE
Payer: OTHER GOVERNMENT

## 2023-06-14 ENCOUNTER — ANESTHESIA EVENT (OUTPATIENT)
Dept: GASTROENTEROLOGY | Facility: HOSPITAL | Age: 63
End: 2023-06-14
Payer: OTHER GOVERNMENT

## 2023-06-14 VITALS
WEIGHT: 190.48 LBS | HEART RATE: 71 BPM | HEIGHT: 64 IN | TEMPERATURE: 98.1 F | RESPIRATION RATE: 18 BRPM | SYSTOLIC BLOOD PRESSURE: 132 MMHG | BODY MASS INDEX: 32.52 KG/M2 | OXYGEN SATURATION: 100 % | DIASTOLIC BLOOD PRESSURE: 74 MMHG

## 2023-06-14 DIAGNOSIS — R13.10 DYSPHAGIA, UNSPECIFIED TYPE: ICD-10-CM

## 2023-06-14 DIAGNOSIS — R10.10 PAIN OF UPPER ABDOMEN: ICD-10-CM

## 2023-06-14 DIAGNOSIS — K21.00 GASTROESOPHAGEAL REFLUX DISEASE WITH ESOPHAGITIS WITHOUT HEMORRHAGE: ICD-10-CM

## 2023-06-14 LAB — GLUCOSE BLDC GLUCOMTR-MCNC: 175 MG/DL (ref 70–130)

## 2023-06-14 PROCEDURE — 43248 EGD GUIDE WIRE INSERTION: CPT | Performed by: INTERNAL MEDICINE

## 2023-06-14 PROCEDURE — 25010000002 PROPOFOL 10 MG/ML EMULSION: Performed by: NURSE ANESTHETIST, CERTIFIED REGISTERED

## 2023-06-14 PROCEDURE — C1769 GUIDE WIRE: HCPCS | Performed by: INTERNAL MEDICINE

## 2023-06-14 PROCEDURE — 82948 REAGENT STRIP/BLOOD GLUCOSE: CPT

## 2023-06-14 RX ORDER — LIDOCAINE HYDROCHLORIDE 20 MG/ML
INJECTION, SOLUTION INTRAVENOUS AS NEEDED
Status: DISCONTINUED | OUTPATIENT
Start: 2023-06-14 | End: 2023-06-14 | Stop reason: SURG

## 2023-06-14 RX ORDER — DEXTROSE AND SODIUM CHLORIDE 5; .45 G/100ML; G/100ML
30 INJECTION, SOLUTION INTRAVENOUS CONTINUOUS PRN
Status: DISCONTINUED | OUTPATIENT
Start: 2023-06-14 | End: 2023-06-14 | Stop reason: HOSPADM

## 2023-06-14 RX ORDER — PROPOFOL 10 MG/ML
VIAL (ML) INTRAVENOUS AS NEEDED
Status: DISCONTINUED | OUTPATIENT
Start: 2023-06-14 | End: 2023-06-14 | Stop reason: SURG

## 2023-06-14 RX ADMIN — PROPOFOL 20 MG: 10 INJECTION, EMULSION INTRAVENOUS at 12:00

## 2023-06-14 RX ADMIN — DEXTROSE AND SODIUM CHLORIDE 30 ML/HR: 5; 450 INJECTION, SOLUTION INTRAVENOUS at 11:10

## 2023-06-14 RX ADMIN — PROPOFOL 40 MG: 10 INJECTION, EMULSION INTRAVENOUS at 11:58

## 2023-06-14 RX ADMIN — PROPOFOL 70 MG: 10 INJECTION, EMULSION INTRAVENOUS at 11:56

## 2023-06-14 RX ADMIN — LIDOCAINE HYDROCHLORIDE 100 MG: 20 INJECTION, SOLUTION INTRAVENOUS at 11:56

## 2023-06-14 NOTE — H&P
Chief Complaint   Patient presents with    Heartburn    Fatty Liver     2 month follow up    Difficulty Swallowing    Nausea              ENDO PROCEDURE ORDERED: EGDw/dil, dysphagia, gerd    Subjective    Ines Saravia is a 63 y.o. female. she is here today for follow-up.    History of Present Illness I agree with the current note with no changes in the history.  Risks and benefits discussed with patient. Patient understands these and would like to proceed with procedure.      Patient seen on a recheck of her throat pain, nausea, dysphagia, iron deficiency. :Last seen 12/27/2022. Patient states she is having increasing dysphagia to solids, is complaining of 8 out of 10 throat pain and a lot of nausea despite taking the Nexium. Bowels are moving without blood. She states she was taking the GoLo for weight loss but feels like it really bothered her stomach. Weight is down 7 pounds since her last visit. Last EGD/colonoscopy 10/04/2022 showed esophageal stricture, gastritis and hyperplastic polyp.     Laboratory 12/29/2022 MCKENNA FibroSure 0.20/F0, steatosis 0.59/S2, 0.50/N1. Cholesterol 259, glucose 126, INR 0.96, TSH normal, A1c 6.8%.     A/P: Patient with nausea, increasing dysphagia suspect recurrent peptic stricture. Recommend EGD with dilatation, will do biopsies as indicated. She was encouraged to continue dietary modification and weight loss attempts. Last LFT's were normal. Will plan follow-up after the above, further pending clinical course and the results of the above.         The following portions of the patient's history were reviewed and updated as appropriate:   Past Medical History:   Diagnosis Date    Abnormal mammogram of right breast     right breast density Spot compression negative 2/3/16       Benign essential hypertension     Cancer (HCC)     Continuous leakage of urine     Dermatitis     Diabetes mellitus (HCC)     Diabetes mellitus screening     Dysuria     Elevated cholesterol      "Encounter for gynecological examination with abnormal finding     Generalized headache     H/O screening mammography     Impacted cerumen, bilateral     Intermittent urinary incontinence     Localized swelling, mass and lump, head     and neck    Mitral valve prolapse     Neck pain     Neck swelling     Neuropathy     Followed by Neurology       Overweight     Pain     Patient's noncompliance with other medical treatment and regimen     Radiculopathy, cervical region     Followed by Neurology       Screening for hyperlipidemia     Stiffness of joint     not elsewhere classified, involving hand       Thyroid disorder screening     Vaginal discharge      Past Surgical History:   Procedure Laterality Date    COLONOSCOPY N/A 09/28/2021    Procedure: COLONOSCOPY;  Surgeon: Humphrey Campbell MD;  Location: [facility] ENDOSCOPY;  Service: Gastroenterology;  Laterality: N/A;    COLONOSCOPY N/A 10/04/2022    Procedure: COLONOSCOPY;  Surgeon: Humphrey Campbell MD;  Location: [facility] ENDOSCOPY;  Service: Gastroenterology;  Laterality: N/A;    ENDOSCOPY N/A 09/28/2021    Procedure: ESOPHAGOGASTRODUODENOSCOPY WITH DILATATION;  Surgeon: Humphrey Campbell MD;  Location: [facility] ENDOSCOPY;  Service: Gastroenterology;  Laterality: N/A;    ENDOSCOPY N/A 10/04/2022    Procedure: ESOPHAGOGASTRODUODENOSCOPY WITH DILATATION;  Surgeon: Humphrey Campbell MD;  Location: [facility] ENDOSCOPY;  Service: Gastroenterology;  Laterality: N/A;    FOOT SURGERY Right 12/20/2022    mole removed by Dr. Nation    OTHER SURGICAL HISTORY  12/19/2015    Remove Impacted Cerumen     TUBAL ABDOMINAL LIGATION       Family History   Problem Relation Age of Onset    Diabetes Other     Hypertension Other     Heart disease Other     Hyperlipidemia Other      OB History    No obstetric history on file.       Allergies   Allergen Reactions    Penicillins Other (See Comments)     \"knots\" on skin    Metronidazole Unknown - Low Severity     Social History     Socioeconomic History "    Marital status:    Tobacco Use    Smoking status: Some Days     Types: Electronic Cigarette    Smokeless tobacco: Never   Vaping Use    Vaping Use: Some days    Substances: Nicotine, Flavoring    Devices: Refillable tank   Substance and Sexual Activity    Alcohol use: No    Drug use: No    Sexual activity: Defer     Current Medications:  Prior to Admission medications    Medication Sig Start Date End Date Taking? Authorizing Provider   atorvastatin (LIPITOR) 20 MG tablet Take 1 tablet by mouth every night at bedtime. 12/29/22  Yes Micheal Morales MD   COVID-19 At Home Antigen Test ([employer]Vue At-Home Covid-19 Test) kit 1 kit by In Vitro route Take As Directed. 1/5/23  Yes Pete Cantu APRN   dapagliflozin (FARXIGA) 5 MG tablet tablet Take 1 tablet by mouth Daily. 12/29/22  Yes Micheal Morales MD   esomeprazole (nexIUM) 40 MG capsule Take 1 capsule by mouth once daily 12/20/22  Yes Jens Mustafa PA-C   glipizide (Glucotrol) 10 MG tablet Take 1 tablet by mouth 2 (Two) Times a Day Before Meals. 12/29/22  Yes Micheal Morales MD   lisinopril (PRINIVIL,ZESTRIL) 20 MG tablet Take 1 tablet by mouth Daily.   Yes ProviderPete MD   lisinopril-hydrochlorothiazide (PRINZIDE,ZESTORETIC) 20-12.5 MG per tablet Take 1 tablet by mouth Daily. 12/29/22  Yes Micheal Morales MD   meloxicam (MOBIC) 15 MG tablet Take 1 tablet by mouth Daily for 30 days. 2/20/23 3/22/23 Yes Rebecca Courtney APRN   metFORMIN (GLUCOPHAGE) 1000 MG tablet Take 1 tablet by mouth 2 (Two) Times a Day. 12/29/22  Yes Micheal Morales MD   metoprolol tartrate (LOPRESSOR) 25 MG tablet Take 1 tablet by mouth Daily. One the night before and one the morning of.. 2/16/23  Yes Roxie Raza MD   multivitamin with minerals tablet tablet Take 1 tablet by mouth Daily.   Yes Provider, MD Pete     Review of Systems  Review of Systems   HENT:  Positive for trouble swallowing.    Gastrointestinal:  Positive  "for abdominal pain and nausea. Negative for constipation and diarrhea.        Objective    /94   Pulse 78   Ht 162.6 cm (64\")   Wt 87.5 kg (193 lb)   BMI 33.13 kg/m²   Physical Exam  Vitals and nursing note reviewed.   Constitutional:       General: She is not in acute distress.     Appearance: She is well-developed. She is obese.      Comments: AA   HENT:      Head: Normocephalic and atraumatic.   Eyes:      Pupils: Pupils are equal, round, and reactive to light.   Cardiovascular:      Rate and Rhythm: Normal rate and regular rhythm.      Heart sounds: Normal heart sounds.   Pulmonary:      Effort: Pulmonary effort is normal.      Breath sounds: Normal breath sounds.   Abdominal:      General: Bowel sounds are normal. There is no distension or abdominal bruit.      Palpations: Abdomen is soft. Abdomen is not rigid. There is no shifting dullness or mass.      Tenderness: There is abdominal tenderness. There is no guarding or rebound.      Hernia: No hernia is present. There is no hernia in the ventral area.   Musculoskeletal:         General: Normal range of motion.      Cervical back: Normal range of motion.   Skin:     General: Skin is warm and dry.   Neurological:      Mental Status: She is alert and oriented to person, place, and time.   Psychiatric:         Behavior: Behavior normal.         Thought Content: Thought content normal.         Judgment: Judgment normal.     Assessment & Plan      1. Pain of upper abdomen    2. Dysphagia, unspecified type    3. Gastroesophageal reflux disease with esophagitis without hemorrhage    4. Fatty liver    .   Diagnoses and all orders for this visit:    1. Pain of upper abdomen (Primary)  -     Case Request; Standing  -     dextrose 5 % and sodium chloride 0.45 % infusion  -     Case Request    2. Dysphagia, unspecified type  -     Case Request; Standing  -     dextrose 5 % and sodium chloride 0.45 % infusion  -     Case Request    3. Gastroesophageal reflux " disease with esophagitis without hemorrhage  -     Case Request; Standing  -     dextrose 5 % and sodium chloride 0.45 % infusion  -     Case Request    4. Fatty liver    Other orders  -     Follow Anesthesia Guidelines / Protocol; Future  -     Obtain Informed Consent; Future  -     Obtain Informed Consent; Standing  -     POC Glucose Once; Standing        Orders placed during this encounter include:  Orders Placed This Encounter   Procedures    Obtain Informed Consent     Standing Status:   Future     Order Specific Question:   Informed Consent Given For     Answer:   ESOPHAGOGASTRODUODENOSCOPY WITH DILATATION       Medications prescribed:  No orders of the defined types were placed in this encounter.      Requested Prescriptions      No prescriptions requested or ordered in this encounter       Review and/or summary of lab tests, radiology, procedures, medications. Review and summary of old records and obtaining of history. The risks and benefits of my recommendations, as well as other treatment options were discussed with the patient today. Questions were answered.    Follow-up: Return for After the above.     ESOPHAGOGASTRODUODENOSCOPY WITH DILATATION (N/A)      This document has been electronically signed by Jens Mustafa PA-C on March 21, 2023 18:32 CDT      Results for orders placed or performed in visit on 03/14/23   BUN    Specimen: Blood   Result Value Ref Range    BUN 17 8 - 23 mg/dL   Creatinine Serum (kidney function) GFR component    Specimen: Blood   Result Value Ref Range    Creatinine 0.64 0.57 - 1.00 mg/dL    eGFR 98.8 >60.0 mL/min/1.73   Results for orders placed or performed in visit on 03/13/23   POC Glycated Hemoglobin, Total    Specimen: Blood   Result Value Ref Range    Hemoglobin A1C 5.7 %    Lot Number [id number]     Expiration Date 6,012,023    Results for orders placed or performed in visit on 02/16/23   ECG 12 Lead   Result Value Ref Range    QT Interval 428 ms    QTC Interval 468 ms    Results for orders placed or performed in visit on 01/05/23   POCT SARS-CoV-2 Antigen CHRIS    Specimen: Swab   Result Value Ref Range    SARS Antigen Not Detected Not Detected, Presumptive Negative    Internal Control Passed Passed    Lot Number [id number]     Expiration Date 06/04/2023    Results for orders placed or performed in visit on 12/29/22   MCKENNA Fibrosure    Specimen: Blood   Result Value Ref Range    Fibrosis Score (References) 0.02 0.00 - 0.21    Fibrosis Stage (Reference) Comment     Steatosis Score (Reference) 0.59 (H) 0.00 - 0.30    Steatosis Grade (Reference) Comment     MCKENNA Score (Reference) 0.50 (H) 0.25    Mckenna Grade (Reference) Comment     Height: (Reference) 64 in    Weight: (Reference) 195 LBS    Alpha 2-Macroglobulins, Qn 123 110 - 276 mg/dL    Haptoglobin 170 37 - 355 mg/dL    Apolipoprotein A-1 205 116 - 209 mg/dL    Total Bilirubin 0.1 0.0 - 1.2 mg/dL    GGT 33 0 - 60 IU/L    ALT (SGPT) 20 0 - 40 IU/L    AST (SGOT) P5P (Reference) 20 0 - 40 IU/L    Cholesterol, Total (Reference) 259 (H) 100 - 199 mg/dL    Glucose, Serum (Reference) 126 (H) 70 - 99 mg/dL    Triglycerides 115 0 - 149 mg/dL    Interpretations: (Reference) Comment     Fibrosis Scoring: Comment     Steatosis Grading (Reference) Comment     Mckenna Scoring (Reference) Comment     Limitations: (Reference) Comment     Comment (Reference) Comment    Protime-INR    Specimen: Blood   Result Value Ref Range    Protime 12.7 11.1 - 15.3 Seconds    INR 0.96 0.80 - 1.20   TSH    Specimen: Blood   Result Value Ref Range    TSH 0.466 0.270 - 4.200 uIU/mL   Results for orders placed or performed in visit on 12/29/22   POC Glycated Hemoglobin, Total    Specimen: Blood   Result Value Ref Range    Hemoglobin A1C 6.8 %    Lot Number [id number]     Expiration Date 10,312,024    Results for orders placed or performed during the hospital encounter of 10/04/22   TISSUE EXAM, [facility] ([facility])    Specimen: A: Gastric, Antrum; Tissue    B: Large  Intestine, Left / Descending Colon; Tissue    C: Large Intestine; Tissue    D: Large Intestine, Sigmoid Colon; Tissue   Result Value Ref Range    Reference Lab Report       [facility]  [address] [place] [address]  Phone: [phone] or [phone]  Fax: [phone]  Jose Miguel Alfaro M.D., Medical Director    PATIENT NAME                           LABORATORY NO.  GENARO TRAN.            [id number]  [id number]                         AGE              SEX  SSN           CLIENT REF #  [facility]           62      1960  F    [id number]   [id number]    [place]                       REQUESTING M.D.     ATTENDING M.D.     COPY [address]                 [place] [address]             DATE COLLECTED      DATE RECEIVED      DATE REPORTED  10/04/2022          10/04/2022         10/05/2022    DIAGNOSIS:  A.   GASTRIC ANTRUM, BIOPSY:  Mild reactive (chemical) gastropathy  Negative for intestinal metaplasia, significant active inflammation,  neoplasia, malignancy  No Helicobacter-like organisms identified on H and E    B.    DESCENDING COLON POLYP:  Small hyperplastic polyp  Negative for malignancy    C.   COLON, BIOPSY:  Benign colonic mucosa, negative for significant architectural distortion,  inflammatory infiltrate, neoplasia, malignancy    D.   SIGMOID COLON POLYP:  Hyperplastic polyp  Negative for malignancy    CLINICAL HISTORY:  Diverticulitis, other iron deficiency anemia, dysphagia, unspecified type, pain of  upper abdomen, left lower quadrant abdominal pain    SPECIMENS RECEIVED:  A.  GASTRIC ANTRUM, BIOPSY  B.  DESCENDING COLON POLYP  C.  COLON, BIOPSY  D.  SIGMOID COLON POLYP    MICROSCOPIC DESCRIPTION:  Tissue blocks are prepared and slides are examined microscopically on all  specimens. See diagnosis for details.    Professional interpretation rendered by Cris Lopez,  "M.D., F.C.A.P. at  [employer], [address], [place] [address].    GROSS DESCRIPTION:  A.  Specimen is received in 1 formalin filled container labeled \"gastric,  antrum\" and consists of 3 pieces of tan soft tissue  measuring 0.5 x 0.3 x 0.2  cm in aggregate.  The specimen is submitted entirely in 1 cassette.  CECI  B.  Specimen is received in 1 formalin filled container labeled \"large intestine,  left/descending colon\" and consists of 1 piece of tan soft tissue measuring  0.4 x 0.2 x 0.2 cm.  The specimen is submitted entirely in 1 cassette.  C.  Specimen is received in 1 formalin filled container labeled \"colonic  mucosa biopsy\" and consists of 2 pieces of tan soft tissue measuring 0.6 x  0.3 x 0.2 cm in aggregate.  The specimen is submitted entirely in 1  cassette.  D.  Specimen is received in 1 formalin filled container labeled \"large intestine,  sigmoid colon\" and consists of 2 pieces of tan soft tissue measuring 0.4 x  0.4 x 0.2 cm in aggregate.  Specimen is submitted entirely in 1 cassette.    REVIEWED, DIAGNOSED AND ELECTRONICALLY  SIGNED BY:    Cris Lopez M.D., F.C.A.P.  CPT CODES:  88305x4     POC Glucose Once    Specimen: Blood   Result Value Ref Range    Glucose 136 (H) 70 - 130 mg/dL   Results for orders placed or performed in visit on 08/18/22   Vitamin B12 and Folate    Specimen: Blood   Result Value Ref Range    Folate 18.00 4.78 - 24.20 ng/mL    Vitamin B-12 818 211 - 946 pg/mL   CBC Auto Differential    Specimen: Blood   Result Value Ref Range    WBC 5.57 3.40 - 10.80 10*3/mm3    RBC 4.05 3.77 - 5.28 10*6/mm3    Hemoglobin 11.3 (L) 12.0 - 15.9 g/dL    Hematocrit 35.2 34.0 - 46.6 %    MCV 86.9 79.0 - 97.0 fL    MCH 27.9 26.6 - 33.0 pg    MCHC 32.1 31.5 - 35.7 g/dL    RDW 12.9 12.3 - 15.4 %    RDW-SD 39.9 37.0 - 54.0 fl    MPV 10.3 6.0 - 12.0 fL    Platelets 369 140 - 450 10*3/mm3    Neutrophil % 54.8 42.7 - 76.0 %    Lymphocyte % 34.1 19.6 - 45.3 %    Monocyte % 9.0 5.0 - 12.0 %    " Eosinophil % 1.4 0.3 - 6.2 %    Basophil % 0.5 0.0 - 1.5 %    Immature Grans % 0.2 0.0 - 0.5 %    Neutrophils, Absolute 3.05 1.70 - 7.00 10*3/mm3    Lymphocytes, Absolute 1.90 0.70 - 3.10 10*3/mm3    Monocytes, Absolute 0.50 0.10 - 0.90 10*3/mm3    Eosinophils, Absolute 0.08 0.00 - 0.40 10*3/mm3    Basophils, Absolute 0.03 0.00 - 0.20 10*3/mm3    Immature Grans, Absolute 0.01 0.00 - 0.05 10*3/mm3    nRBC 0.0 0.0 - 0.2 /100 WBC   Reticulocytes    Specimen: Blood   Result Value Ref Range    Reticulocyte % 1.54 0.70 - 1.90 %    Reticulocyte Absolute 0.0624 0.0200 - 0.1300 10*6/mm3   Iron    Specimen: Blood   Result Value Ref Range    Iron 62 37 - 145 mcg/dL   Ferritin    Specimen: Blood   Result Value Ref Range    Ferritin 314.00 (H) 13.00 - 150.00 ng/mL   Results for orders placed or performed in visit on 07/27/22   Urinalysis, Microscopic Only - Urine, Clean Catch    Specimen: Urine, Clean Catch   Result Value Ref Range    RBC, UA 0-2 None Seen, 0-2 /HPF    WBC, UA 0-2 None Seen, 0-2 /HPF    Bacteria, UA None Seen None Seen /HPF    Squamous Epithelial Cells, UA 0-2 None Seen, 0-2 /HPF    Hyaline Casts, UA 3-6 None Seen /LPF    Calcium Oxalate Crystals, UA Moderate/2+ None Seen /HPF    Methodology Manual Light Microscopy    Urinalysis With Culture If Indicated - Urine, Clean Catch    Specimen: Urine, Clean Catch   Result Value Ref Range    Color, UA Dark Yellow (A) Yellow, Straw    Appearance, UA Cloudy (A) Clear    pH, UA 6.0 5.0 - 8.0    Specific Gravity, UA >=1.030 1.005 - 1.030    Glucose, UA >=1000 mg/dL (3+) (A) Negative    Ketones, UA Trace (A) Negative    Bilirubin, UA Negative Negative    Blood, UA Negative Negative    Protein, UA 30 mg/dL (1+) (A) Negative    Leuk Esterase, UA Negative Negative    Nitrite, UA Negative Negative    Urobilinogen, UA 0.2 E.U./dL 0.2 - 1.0 E.U./dL   CBC Auto Differential    Specimen: Blood   Result Value Ref Range    WBC 7.54 3.40 - 10.80 10*3/mm3    RBC 4.22 3.77 - 5.28 10*6/mm3     Hemoglobin 11.7 (L) 12.0 - 15.9 g/dL    Hematocrit 36.7 34.0 - 46.6 %    MCV 87.0 79.0 - 97.0 fL    MCH 27.7 26.6 - 33.0 pg    MCHC 31.9 31.5 - 35.7 g/dL    RDW 13.2 12.3 - 15.4 %    RDW-SD 40.9 37.0 - 54.0 fl    MPV 9.7 6.0 - 12.0 fL    Platelets 461 (H) 140 - 450 10*3/mm3    Neutrophil % 57.5 42.7 - 76.0 %    Lymphocyte % 31.6 19.6 - 45.3 %    Monocyte % 8.9 5.0 - 12.0 %    Eosinophil % 1.2 0.3 - 6.2 %    Basophil % 0.5 0.0 - 1.5 %    Immature Grans % 0.3 0.0 - 0.5 %    Neutrophils, Absolute 4.34 1.70 - 7.00 10*3/mm3    Lymphocytes, Absolute 2.38 0.70 - 3.10 10*3/mm3    Monocytes, Absolute 0.67 0.10 - 0.90 10*3/mm3    Eosinophils, Absolute 0.09 0.00 - 0.40 10*3/mm3    Basophils, Absolute 0.04 0.00 - 0.20 10*3/mm3    Immature Grans, Absolute 0.02 0.00 - 0.05 10*3/mm3    nRBC 0.0 0.0 - 0.2 /100 WBC     *Note: Due to a large number of results and/or encounters for the requested time period, some results have not been displayed. A complete set of results can be found in Results Review.

## 2023-06-14 NOTE — ANESTHESIA POSTPROCEDURE EVALUATION
Patient: Ines Saravia    Procedure Summary       Date: 06/14/23 Room / Location: [facility] ENDOSCOPY 1 / [facility] ENDOSCOPY    Anesthesia Start: 1147 Anesthesia Stop: 1202    Procedure: ESOPHAGOGASTRODUODENOSCOPY WITH DILATATION Diagnosis:       Pain of upper abdomen      Dysphagia, unspecified type      Gastroesophageal reflux disease with esophagitis without hemorrhage      (Pain of upper abdomen [R10.10])      (Dysphagia, unspecified type [R13.10])      (Gastroesophageal reflux disease with esophagitis without hemorrhage [K21.00])    Surgeons: Humphrey Campbell MD Provider: Evert Salvador CRNA    Anesthesia Type: general ASA Status: 3            Anesthesia Type: general    Vitals  No vitals data found for the desired time range.          Post Anesthesia Care and Evaluation    Patient location during evaluation: PHASE II  Patient participation: waiting for patient participation  Level of consciousness: sleepy but conscious  Pain management: adequate    Airway patency: patent  Anesthetic complications: No anesthetic complications  PONV Status: none  Cardiovascular status: acceptable  Respiratory status: acceptable, spontaneous ventilation and room air  Hydration status: acceptable    Comments:   HR 89  /81  RR 29  Spo2 100%

## 2023-06-14 NOTE — ANESTHESIA PREPROCEDURE EVALUATION
Anesthesia Evaluation     Patient summary reviewed and Nursing notes reviewed   no history of anesthetic complications:   NPO Solid Status: > 8 hours  NPO Liquid Status: > 2 hours           Airway   Mallampati: II  TM distance: >3 FB  Neck ROM: full  No difficulty expected  Dental    (+) poor dentition    Pulmonary - normal exam   (+) a smoker Current,  Cardiovascular - normal exam    (+) hypertension, valvular problems/murmurs MVPhyperlipidemia      Neuro/Psych  (+) headaches, numbness  GI/Hepatic/Renal/Endo    (+) obesity, diabetes mellitus type 2    Musculoskeletal     (+) back pain, neck pain, radiculopathy  Abdominal  - normal exam   Substance History      OB/GYN          Other      history of cancer                    Anesthesia Plan    ASA 3     general   total IV anesthesia  intravenous induction     Anesthetic plan, risks, benefits, and alternatives have been provided, discussed and informed consent has been obtained with: patient.

## 2023-07-25 DIAGNOSIS — E11.69 TYPE 2 DIABETES MELLITUS WITH OTHER SPECIFIED COMPLICATION, WITHOUT LONG-TERM CURRENT USE OF INSULIN: Chronic | ICD-10-CM

## 2023-07-25 RX ORDER — GLIPIZIDE 10 MG/1
10 TABLET ORAL
Qty: 180 TABLET | Refills: 0 | Status: SHIPPED | OUTPATIENT
Start: 2023-07-25

## 2023-07-28 RX ORDER — ESOMEPRAZOLE MAGNESIUM 40 MG/1
40 CAPSULE, DELAYED RELEASE ORAL DAILY
Qty: 90 CAPSULE | Refills: 1 | Status: SHIPPED | OUTPATIENT
Start: 2023-07-28

## 2023-08-04 ENCOUNTER — OFFICE VISIT (OUTPATIENT)
Dept: FAMILY MEDICINE CLINIC | Facility: CLINIC | Age: 63
End: 2023-08-04
Payer: OTHER GOVERNMENT

## 2023-08-04 VITALS
WEIGHT: 189.4 LBS | TEMPERATURE: 97.3 F | HEIGHT: 64 IN | DIASTOLIC BLOOD PRESSURE: 80 MMHG | OXYGEN SATURATION: 94 % | SYSTOLIC BLOOD PRESSURE: 120 MMHG | HEART RATE: 84 BPM | BODY MASS INDEX: 32.33 KG/M2

## 2023-08-04 DIAGNOSIS — E11.69 TYPE 2 DIABETES MELLITUS WITH OTHER SPECIFIED COMPLICATION, WITHOUT LONG-TERM CURRENT USE OF INSULIN: Chronic | ICD-10-CM

## 2023-08-04 DIAGNOSIS — H92.02 EARACHE ON LEFT: Primary | ICD-10-CM

## 2023-08-04 DIAGNOSIS — I10 ESSENTIAL HYPERTENSION: Chronic | ICD-10-CM

## 2023-08-04 RX ORDER — AZITHROMYCIN 250 MG/1
TABLET, FILM COATED ORAL
Qty: 6 TABLET | Refills: 0 | Status: SHIPPED | OUTPATIENT
Start: 2023-08-04

## 2023-08-04 RX ORDER — LISINOPRIL 20 MG/1
20 TABLET ORAL DAILY
Qty: 90 TABLET | Refills: 0 | Status: SHIPPED | OUTPATIENT
Start: 2023-08-04

## 2023-08-04 RX ORDER — GLIPIZIDE 10 MG/1
10 TABLET ORAL
Qty: 180 TABLET | Refills: 0 | Status: SHIPPED | OUTPATIENT
Start: 2023-08-04

## 2023-08-04 RX ORDER — DAPAGLIFLOZIN 5 MG/1
1 TABLET, FILM COATED ORAL DAILY
COMMUNITY
Start: 2023-07-28

## 2023-08-04 RX ORDER — PREDNISONE 10 MG/1
10 TABLET ORAL SEE ADMIN INSTRUCTIONS
Qty: 17 TABLET | Refills: 0 | Status: SHIPPED | OUTPATIENT
Start: 2023-08-04

## 2023-08-18 ENCOUNTER — OFFICE VISIT (OUTPATIENT)
Dept: FAMILY MEDICINE CLINIC | Facility: CLINIC | Age: 63
End: 2023-08-18
Payer: OTHER GOVERNMENT

## 2023-08-18 ENCOUNTER — LAB (OUTPATIENT)
Dept: LAB | Facility: HOSPITAL | Age: 63
End: 2023-08-18
Payer: OTHER GOVERNMENT

## 2023-08-18 VITALS
WEIGHT: 190.6 LBS | HEART RATE: 80 BPM | BODY MASS INDEX: 32.54 KG/M2 | OXYGEN SATURATION: 96 % | HEIGHT: 64 IN | SYSTOLIC BLOOD PRESSURE: 120 MMHG | DIASTOLIC BLOOD PRESSURE: 66 MMHG | TEMPERATURE: 97.3 F

## 2023-08-18 DIAGNOSIS — K21.00 GASTROESOPHAGEAL REFLUX DISEASE WITH ESOPHAGITIS WITHOUT HEMORRHAGE: ICD-10-CM

## 2023-08-18 DIAGNOSIS — R13.10 DYSPHAGIA, UNSPECIFIED TYPE: ICD-10-CM

## 2023-08-18 DIAGNOSIS — E78.49 OTHER HYPERLIPIDEMIA: Chronic | ICD-10-CM

## 2023-08-18 DIAGNOSIS — K22.2 PEPTIC STRICTURE OF ESOPHAGUS: ICD-10-CM

## 2023-08-18 DIAGNOSIS — R10.10 PAIN OF UPPER ABDOMEN: ICD-10-CM

## 2023-08-18 DIAGNOSIS — I10 ESSENTIAL HYPERTENSION: ICD-10-CM

## 2023-08-18 DIAGNOSIS — M25.572 ACUTE LEFT ANKLE PAIN: ICD-10-CM

## 2023-08-18 DIAGNOSIS — I10 ESSENTIAL HYPERTENSION: Primary | Chronic | ICD-10-CM

## 2023-08-18 DIAGNOSIS — K76.0 FATTY LIVER: ICD-10-CM

## 2023-08-18 PROCEDURE — 36415 COLL VENOUS BLD VENIPUNCTURE: CPT

## 2023-08-18 RX ORDER — LISINOPRIL AND HYDROCHLOROTHIAZIDE 20; 12.5 MG/1; MG/1
1 TABLET ORAL DAILY
Qty: 90 TABLET | Refills: 1 | Status: SHIPPED | OUTPATIENT
Start: 2023-08-18

## 2023-08-18 RX ORDER — ATORVASTATIN CALCIUM 20 MG/1
20 TABLET, FILM COATED ORAL
Qty: 90 TABLET | Refills: 1 | Status: SHIPPED | OUTPATIENT
Start: 2023-08-18

## 2023-08-18 RX ORDER — LISINOPRIL 20 MG/1
20 TABLET ORAL DAILY
Qty: 90 TABLET | Refills: 1 | Status: SHIPPED | OUTPATIENT
Start: 2023-08-18
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
## 2023-08-21 ENCOUNTER — LAB (OUTPATIENT)
Dept: LAB | Facility: HOSPITAL | Age: 63
End: 2023-08-21
Payer: OTHER GOVERNMENT

## 2023-08-21 LAB
ALBUMIN SERPL-MCNC: 4.3 G/DL (ref 3.5–5.2)
ALBUMIN/GLOB SERPL: 2.2 G/DL
ALP SERPL-CCNC: 75 U/L (ref 39–117)
ALT SERPL W P-5'-P-CCNC: 18 U/L (ref 1–33)
ANION GAP SERPL CALCULATED.3IONS-SCNC: 12 MMOL/L (ref 5–15)
AST SERPL-CCNC: 15 U/L (ref 1–32)
BASOPHILS # BLD AUTO: 0.03 10*3/MM3 (ref 0–0.2)
BASOPHILS NFR BLD AUTO: 0.6 % (ref 0–1.5)
BILIRUB SERPL-MCNC: 0.3 MG/DL (ref 0–1.2)
BILIRUB UR QL STRIP: NEGATIVE
BUN SERPL-MCNC: 16 MG/DL (ref 8–23)
BUN/CREAT SERPL: 23.9 (ref 7–25)
CALCIUM SPEC-SCNC: 9.1 MG/DL (ref 8.6–10.5)
CHLORIDE SERPL-SCNC: 107 MMOL/L (ref 98–107)
CHOLEST SERPL-MCNC: 232 MG/DL (ref 0–200)
CLARITY UR: CLEAR
CO2 SERPL-SCNC: 23 MMOL/L (ref 22–29)
COLOR UR: YELLOW
CREAT SERPL-MCNC: 0.67 MG/DL (ref 0.57–1)
DEPRECATED RDW RBC AUTO: 42.2 FL (ref 37–54)
EGFRCR SERPLBLD CKD-EPI 2021: 97.7 ML/MIN/1.73
EOSINOPHIL # BLD AUTO: 0.04 10*3/MM3 (ref 0–0.4)
EOSINOPHIL NFR BLD AUTO: 0.8 % (ref 0.3–6.2)
ERYTHROCYTE [DISTWIDTH] IN BLOOD BY AUTOMATED COUNT: 13 % (ref 12.3–15.4)
GLOBULIN UR ELPH-MCNC: 2 GM/DL
GLUCOSE SERPL-MCNC: 135 MG/DL (ref 65–99)
GLUCOSE UR STRIP-MCNC: ABNORMAL MG/DL
HCT VFR BLD AUTO: 35.3 % (ref 34–46.6)
HDLC SERPL-MCNC: 66 MG/DL (ref 40–60)
HGB BLD-MCNC: 11.1 G/DL (ref 12–15.9)
HGB UR QL STRIP.AUTO: NEGATIVE
IMM GRANULOCYTES # BLD AUTO: 0.02 10*3/MM3 (ref 0–0.05)
IMM GRANULOCYTES NFR BLD AUTO: 0.4 % (ref 0–0.5)
KETONES UR QL STRIP: NEGATIVE
LDLC SERPL CALC-MCNC: 152 MG/DL (ref 0–100)
LDLC/HDLC SERPL: 2.27 {RATIO}
LEUKOCYTE ESTERASE UR QL STRIP.AUTO: NEGATIVE
LYMPHOCYTES # BLD AUTO: 1.65 10*3/MM3 (ref 0.7–3.1)
LYMPHOCYTES NFR BLD AUTO: 32.7 % (ref 19.6–45.3)
MCH RBC QN AUTO: 28.1 PG (ref 26.6–33)
MCHC RBC AUTO-ENTMCNC: 31.4 G/DL (ref 31.5–35.7)
MCV RBC AUTO: 89.4 FL (ref 79–97)
MONOCYTES # BLD AUTO: 0.45 10*3/MM3 (ref 0.1–0.9)
MONOCYTES NFR BLD AUTO: 8.9 % (ref 5–12)
NEUTROPHILS NFR BLD AUTO: 2.85 10*3/MM3 (ref 1.7–7)
NEUTROPHILS NFR BLD AUTO: 56.6 % (ref 42.7–76)
NITRITE UR QL STRIP: NEGATIVE
NRBC BLD AUTO-RTO: 0 /100 WBC (ref 0–0.2)
PH UR STRIP.AUTO: 7.5 [PH] (ref 5–8)
PLATELET # BLD AUTO: 333 10*3/MM3 (ref 140–450)
PMV BLD AUTO: 10 FL (ref 6–12)
POTASSIUM SERPL-SCNC: 3.8 MMOL/L (ref 3.5–5.2)
PROT SERPL-MCNC: 6.3 G/DL (ref 6–8.5)
PROT UR QL STRIP: NEGATIVE
RBC # BLD AUTO: 3.95 10*6/MM3 (ref 3.77–5.28)
SODIUM SERPL-SCNC: 142 MMOL/L (ref 136–145)
SP GR UR STRIP: 1.01 (ref 1–1.03)
TRIGL SERPL-MCNC: 80 MG/DL (ref 0–150)
TSH SERPL DL<=0.05 MIU/L-ACNC: 0.32 UIU/ML (ref 0.27–4.2)
UROBILINOGEN UR QL STRIP: ABNORMAL
VLDLC SERPL-MCNC: 14 MG/DL (ref 5–40)
WBC NRBC COR # BLD: 5.04 10*3/MM3 (ref 3.4–10.8)

## 2023-08-21 PROCEDURE — 81003 URINALYSIS AUTO W/O SCOPE: CPT

## 2023-08-21 PROCEDURE — 80050 GENERAL HEALTH PANEL: CPT

## 2023-08-21 PROCEDURE — 80061 LIPID PANEL: CPT

## 2023-09-19 ENCOUNTER — OFFICE VISIT (OUTPATIENT)
Dept: FAMILY MEDICINE CLINIC | Facility: CLINIC | Age: 63
End: 2023-09-19
Payer: OTHER GOVERNMENT

## 2023-09-19 VITALS
HEIGHT: 64 IN | BODY MASS INDEX: 32.95 KG/M2 | DIASTOLIC BLOOD PRESSURE: 82 MMHG | WEIGHT: 193 LBS | SYSTOLIC BLOOD PRESSURE: 122 MMHG | HEART RATE: 82 BPM | TEMPERATURE: 97.1 F | OXYGEN SATURATION: 95 %

## 2023-09-19 DIAGNOSIS — E78.2 MIXED HYPERLIPIDEMIA: Chronic | ICD-10-CM

## 2023-09-19 DIAGNOSIS — H92.02 EARACHE ON LEFT: Chronic | ICD-10-CM

## 2023-09-19 DIAGNOSIS — E11.69 TYPE 2 DIABETES MELLITUS WITH OTHER SPECIFIED COMPLICATION, WITHOUT LONG-TERM CURRENT USE OF INSULIN: Chronic | ICD-10-CM

## 2023-09-19 DIAGNOSIS — K57.92 DIVERTICULITIS: ICD-10-CM

## 2023-09-19 PROCEDURE — 99214 OFFICE O/P EST MOD 30 MIN: CPT | Performed by: FAMILY MEDICINE

## 2023-09-19 RX ORDER — ROSUVASTATIN CALCIUM 20 MG/1
20 TABLET, COATED ORAL DAILY
Qty: 90 TABLET | Refills: 1 | Status: SHIPPED | OUTPATIENT
Start: 2023-09-19

## 2023-09-19 RX ORDER — CIPROFLOXACIN 500 MG/1
500 TABLET, FILM COATED ORAL 2 TIMES DAILY
Qty: 10 TABLET | Refills: 0 | Status: SHIPPED | OUTPATIENT
Start: 2023-09-19

## 2023-09-19 RX ORDER — GLIPIZIDE 10 MG/1
10 TABLET ORAL
Qty: 180 TABLET | Refills: 0 | Status: SHIPPED | OUTPATIENT
Start: 2023-09-19

## 2023-09-19 RX ORDER — METRONIDAZOLE 500 MG/1
500 TABLET ORAL 3 TIMES DAILY
Qty: 15 TABLET | Refills: 0 | Status: SHIPPED | OUTPATIENT
Start: 2023-09-19
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
## (undated) DEVICE — CLEANGUIDE DISPOSABLE MARKED SPRING TIP GUIDEWIRE, 1.86 MM X 210 CM: Brand: CLEANGUIDE

## (undated) DEVICE — BITEBLOCK ENDO W/STRAP 60F A/ LF DISP

## (undated) DEVICE — SINGLE-USE BIOPSY FORCEPS: Brand: RADIAL JAW 4

## (undated) DEVICE — CANN SMPL SOFTECH BIFLO ETCO2 A/M 7FT